# Patient Record
Sex: MALE | Race: WHITE | NOT HISPANIC OR LATINO
[De-identification: names, ages, dates, MRNs, and addresses within clinical notes are randomized per-mention and may not be internally consistent; named-entity substitution may affect disease eponyms.]

---

## 2024-11-06 PROBLEM — Z00.00 ENCOUNTER FOR PREVENTIVE HEALTH EXAMINATION: Status: ACTIVE | Noted: 2024-11-06

## 2024-11-07 DIAGNOSIS — I38 ENDOCARDITIS, VALVE UNSPECIFIED: ICD-10-CM

## 2024-11-14 ENCOUNTER — NON-APPOINTMENT (OUTPATIENT)
Age: 69
End: 2024-11-14

## 2024-12-02 ENCOUNTER — OUTPATIENT (OUTPATIENT)
Dept: OUTPATIENT SERVICES | Facility: HOSPITAL | Age: 69
LOS: 1 days | End: 2024-12-02
Payer: MEDICARE

## 2024-12-02 ENCOUNTER — RESULT REVIEW (OUTPATIENT)
Age: 69
End: 2024-12-02

## 2024-12-02 ENCOUNTER — NON-APPOINTMENT (OUTPATIENT)
Age: 69
End: 2024-12-02

## 2024-12-02 ENCOUNTER — APPOINTMENT (OUTPATIENT)
Dept: CARDIOTHORACIC SURGERY | Facility: CLINIC | Age: 69
End: 2024-12-02
Payer: MEDICARE

## 2024-12-02 ENCOUNTER — APPOINTMENT (OUTPATIENT)
Dept: CT IMAGING | Facility: HOSPITAL | Age: 69
End: 2024-12-02
Payer: MEDICARE

## 2024-12-02 ENCOUNTER — APPOINTMENT (OUTPATIENT)
Dept: ULTRASOUND IMAGING | Facility: HOSPITAL | Age: 69
End: 2024-12-02

## 2024-12-02 VITALS
HEART RATE: 70 BPM | DIASTOLIC BLOOD PRESSURE: 55 MMHG | HEIGHT: 70 IN | OXYGEN SATURATION: 93 % | BODY MASS INDEX: 44.67 KG/M2 | SYSTOLIC BLOOD PRESSURE: 105 MMHG | TEMPERATURE: 97.3 F | WEIGHT: 312 LBS

## 2024-12-02 DIAGNOSIS — Z87.39 PERSONAL HISTORY OF OTHER DISEASES OF THE MUSCULOSKELETAL SYSTEM AND CONNECTIVE TISSUE: ICD-10-CM

## 2024-12-02 DIAGNOSIS — Z86.79 PERSONAL HISTORY OF OTHER DISEASES OF THE CIRCULATORY SYSTEM: ICD-10-CM

## 2024-12-02 DIAGNOSIS — Z86.69 PERSONAL HISTORY OF OTHER DISEASES OF THE NERVOUS SYSTEM AND SENSE ORGANS: ICD-10-CM

## 2024-12-02 DIAGNOSIS — I51.3 INTRACARDIAC THROMBOSIS, NOT ELSEWHERE CLASSIFIED: ICD-10-CM

## 2024-12-02 DIAGNOSIS — Z86.39 PERSONAL HISTORY OF OTHER ENDOCRINE, NUTRITIONAL AND METABOLIC DISEASE: ICD-10-CM

## 2024-12-02 DIAGNOSIS — I25.118 ATHEROSCLEROTIC HEART DISEASE OF NATIVE CORONARY ARTERY WITH OTHER FORMS OF ANGINA PECTORIS: ICD-10-CM

## 2024-12-02 DIAGNOSIS — Z87.891 PERSONAL HISTORY OF NICOTINE DEPENDENCE: ICD-10-CM

## 2024-12-02 DIAGNOSIS — I38 ENDOCARDITIS, VALVE UNSPECIFIED: ICD-10-CM

## 2024-12-02 DIAGNOSIS — Z87.19 PERSONAL HISTORY OF OTHER DISEASES OF THE DIGESTIVE SYSTEM: ICD-10-CM

## 2024-12-02 DIAGNOSIS — Z87.448 PERSONAL HISTORY OF OTHER DISEASES OF URINARY SYSTEM: ICD-10-CM

## 2024-12-02 DIAGNOSIS — I35.0 NONRHEUMATIC AORTIC (VALVE) STENOSIS: ICD-10-CM

## 2024-12-02 PROCEDURE — 93880 EXTRACRANIAL BILAT STUDY: CPT

## 2024-12-02 PROCEDURE — 82565 ASSAY OF CREATININE: CPT

## 2024-12-02 PROCEDURE — 93321 DOPPLER ECHO F-UP/LMTD STD: CPT

## 2024-12-02 PROCEDURE — 71275 CT ANGIOGRAPHY CHEST: CPT | Mod: 26

## 2024-12-02 PROCEDURE — 83880 ASSAY OF NATRIURETIC PEPTIDE: CPT

## 2024-12-02 PROCEDURE — 75573 CT HRT C+ STRUX CGEN HRT DS: CPT | Mod: 26

## 2024-12-02 PROCEDURE — 93321 DOPPLER ECHO F-UP/LMTD STD: CPT | Mod: 26

## 2024-12-02 PROCEDURE — 80053 COMPREHEN METABOLIC PANEL: CPT

## 2024-12-02 PROCEDURE — 93880 EXTRACRANIAL BILAT STUDY: CPT | Mod: 26

## 2024-12-02 PROCEDURE — 74174 CTA ABD&PLVS W/CONTRAST: CPT

## 2024-12-02 PROCEDURE — 36415 COLL VENOUS BLD VENIPUNCTURE: CPT

## 2024-12-02 PROCEDURE — 86900 BLOOD TYPING SEROLOGIC ABO: CPT

## 2024-12-02 PROCEDURE — 74174 CTA ABD&PLVS W/CONTRAST: CPT | Mod: 26

## 2024-12-02 PROCEDURE — 93308 TTE F-UP OR LMTD: CPT | Mod: 26

## 2024-12-02 PROCEDURE — 71275 CT ANGIOGRAPHY CHEST: CPT

## 2024-12-02 PROCEDURE — 86850 RBC ANTIBODY SCREEN: CPT

## 2024-12-02 PROCEDURE — 86901 BLOOD TYPING SEROLOGIC RH(D): CPT

## 2024-12-02 PROCEDURE — 75573 CT HRT C+ STRUX CGEN HRT DS: CPT

## 2024-12-02 PROCEDURE — 85730 THROMBOPLASTIN TIME PARTIAL: CPT

## 2024-12-02 PROCEDURE — 85025 COMPLETE CBC W/AUTO DIFF WBC: CPT

## 2024-12-02 PROCEDURE — 85610 PROTHROMBIN TIME: CPT

## 2024-12-02 PROCEDURE — 99204 OFFICE O/P NEW MOD 45 MIN: CPT

## 2024-12-04 PROBLEM — Z86.79 HISTORY OF HYPERTENSION: Status: RESOLVED | Noted: 2024-12-04 | Resolved: 2024-12-04

## 2024-12-04 PROBLEM — Z87.891 FORMER SMOKER: Status: ACTIVE | Noted: 2024-12-04

## 2024-12-04 PROBLEM — Z87.39 HISTORY OF GOUT: Status: RESOLVED | Noted: 2024-12-04 | Resolved: 2024-12-04

## 2024-12-04 PROBLEM — Z86.39 HISTORY OF DIABETES MELLITUS: Status: RESOLVED | Noted: 2024-12-04 | Resolved: 2024-12-04

## 2024-12-04 PROBLEM — I51.3 LV (LEFT VENTRICULAR) MURAL THROMBUS: Status: RESOLVED | Noted: 2024-12-04 | Resolved: 2024-12-04

## 2024-12-04 PROBLEM — Z87.448 HISTORY OF CHRONIC KIDNEY DISEASE: Status: RESOLVED | Noted: 2024-12-04 | Resolved: 2024-12-04

## 2024-12-04 PROBLEM — Z86.39 HISTORY OF HYPERLIPIDEMIA: Status: RESOLVED | Noted: 2024-12-04 | Resolved: 2024-12-04

## 2024-12-04 PROBLEM — Z86.69 HISTORY OF SLEEP APNEA: Status: RESOLVED | Noted: 2024-12-04 | Resolved: 2024-12-04

## 2024-12-04 PROBLEM — I25.118 CORONARY ARTERY DISEASE WITH OTHER FORM OF ANGINA PECTORIS: Status: RESOLVED | Noted: 2024-12-04 | Resolved: 2024-12-04

## 2024-12-04 PROBLEM — Z87.19 HISTORY OF FATTY INFILTRATION OF LIVER: Status: RESOLVED | Noted: 2024-12-04 | Resolved: 2024-12-04

## 2024-12-04 RX ORDER — RIVAROXABAN 20 MG/1
20 TABLET, FILM COATED ORAL
Qty: 30 | Refills: 0 | Status: ACTIVE | COMMUNITY
Start: 2024-10-06

## 2024-12-05 RX ORDER — TIRZEPATIDE 2.5 MG/.5ML
2.5 INJECTION, SOLUTION SUBCUTANEOUS
Qty: 2 | Refills: 0 | Status: ACTIVE | COMMUNITY
Start: 2024-06-11

## 2024-12-05 RX ORDER — LISINOPRIL 2.5 MG/1
2.5 TABLET ORAL
Qty: 90 | Refills: 0 | Status: ACTIVE | COMMUNITY
Start: 2024-06-26

## 2024-12-05 RX ORDER — TICAGRELOR 90 MG/1
90 TABLET ORAL
Qty: 180 | Refills: 0 | Status: ACTIVE | COMMUNITY
Start: 2024-10-06

## 2024-12-05 RX ORDER — BUDESONIDE, GLYCOPYRROLATE, AND FORMOTEROL FUMARATE 160; 9; 4.8 UG/1; UG/1; UG/1
160-9-4.8 AEROSOL, METERED RESPIRATORY (INHALATION)
Qty: 11 | Refills: 0 | Status: ACTIVE | COMMUNITY
Start: 2024-09-09

## 2024-12-06 DIAGNOSIS — I70.0 ATHEROSCLEROSIS OF AORTA: ICD-10-CM

## 2024-12-06 DIAGNOSIS — I65.23 OCCLUSION AND STENOSIS OF BILATERAL CAROTID ARTERIES: ICD-10-CM

## 2024-12-06 DIAGNOSIS — N40.0 BENIGN PROSTATIC HYPERPLASIA WITHOUT LOWER URINARY TRACT SYMPTOMS: ICD-10-CM

## 2024-12-06 DIAGNOSIS — K40.90 UNILATERAL INGUINAL HERNIA, WITHOUT OBSTRUCTION OR GANGRENE, NOT SPECIFIED AS RECURRENT: ICD-10-CM

## 2024-12-06 DIAGNOSIS — R59.0 LOCALIZED ENLARGED LYMPH NODES: ICD-10-CM

## 2024-12-06 DIAGNOSIS — I25.10 ATHEROSCLEROTIC HEART DISEASE OF NATIVE CORONARY ARTERY WITHOUT ANGINA PECTORIS: ICD-10-CM

## 2024-12-06 DIAGNOSIS — I35.0 NONRHEUMATIC AORTIC (VALVE) STENOSIS: ICD-10-CM

## 2024-12-06 DIAGNOSIS — K80.20 CALCULUS OF GALLBLADDER WITHOUT CHOLECYSTITIS WITHOUT OBSTRUCTION: ICD-10-CM

## 2024-12-06 DIAGNOSIS — Z01.818 ENCOUNTER FOR OTHER PREPROCEDURAL EXAMINATION: ICD-10-CM

## 2024-12-06 DIAGNOSIS — J98.11 ATELECTASIS: ICD-10-CM

## 2024-12-10 ENCOUNTER — APPOINTMENT (OUTPATIENT)
Dept: CARDIOTHORACIC SURGERY | Facility: CLINIC | Age: 69
End: 2024-12-10
Payer: MEDICARE

## 2024-12-10 DIAGNOSIS — I27.20 PULMONARY HYPERTENSION, UNSPECIFIED: ICD-10-CM

## 2024-12-10 DIAGNOSIS — I48.91 UNSPECIFIED ATRIAL FIBRILLATION: ICD-10-CM

## 2024-12-10 DIAGNOSIS — Z86.718 PERSONAL HISTORY OF OTHER VENOUS THROMBOSIS AND EMBOLISM: ICD-10-CM

## 2024-12-10 PROCEDURE — 99204 OFFICE O/P NEW MOD 45 MIN: CPT

## 2024-12-10 PROCEDURE — 99214 OFFICE O/P EST MOD 30 MIN: CPT

## 2024-12-10 RX ORDER — MONTELUKAST 10 MG/1
10 TABLET, FILM COATED ORAL
Refills: 0 | Status: ACTIVE | COMMUNITY

## 2024-12-10 RX ORDER — FENOFIBRATE 145 MG/1
145 TABLET ORAL DAILY
Refills: 0 | Status: ACTIVE | COMMUNITY

## 2024-12-10 RX ORDER — ICOSAPENT ETHYL 1000 MG/1
1 CAPSULE ORAL
Refills: 0 | Status: ACTIVE | COMMUNITY

## 2024-12-10 RX ORDER — METOPROLOL SUCCINATE 25 MG/1
25 TABLET, EXTENDED RELEASE ORAL
Qty: 90 | Refills: 3 | Status: ACTIVE | COMMUNITY
Start: 2024-10-06

## 2024-12-10 RX ORDER — TAMSULOSIN HYDROCHLORIDE 0.4 MG/1
0.4 CAPSULE ORAL
Qty: 90 | Refills: 3 | Status: ACTIVE | COMMUNITY

## 2024-12-10 RX ORDER — ATORVASTATIN CALCIUM 10 MG/1
10 TABLET, FILM COATED ORAL
Refills: 0 | Status: ACTIVE | COMMUNITY

## 2024-12-10 RX ORDER — MACITENTAN 10 MG/1
10 TABLET, FILM COATED ORAL
Refills: 0 | Status: ACTIVE | COMMUNITY

## 2024-12-11 ENCOUNTER — NON-APPOINTMENT (OUTPATIENT)
Age: 69
End: 2024-12-11

## 2024-12-11 PROBLEM — I48.91 ATRIAL FIBRILLATION, UNSPECIFIED TYPE: Status: ACTIVE | Noted: 2024-12-04

## 2024-12-11 PROBLEM — Z86.718 HISTORY OF DVT (DEEP VEIN THROMBOSIS): Status: RESOLVED | Noted: 2024-12-11 | Resolved: 2024-12-11

## 2024-12-20 ENCOUNTER — NON-APPOINTMENT (OUTPATIENT)
Age: 69
End: 2024-12-20

## 2024-12-23 ENCOUNTER — APPOINTMENT (OUTPATIENT)
Dept: CARDIOTHORACIC SURGERY | Facility: HOSPITAL | Age: 69
End: 2024-12-23

## 2025-01-04 ENCOUNTER — INPATIENT (INPATIENT)
Facility: HOSPITAL | Age: 70
LOS: 8 days | Discharge: HOME | End: 2025-01-13
Attending: INTERNAL MEDICINE | Admitting: INTERNAL MEDICINE
Payer: MEDICARE

## 2025-01-04 VITALS
OXYGEN SATURATION: 97 % | HEART RATE: 93 BPM | WEIGHT: 309.09 LBS | RESPIRATION RATE: 14 BRPM | SYSTOLIC BLOOD PRESSURE: 98 MMHG | DIASTOLIC BLOOD PRESSURE: 60 MMHG

## 2025-01-04 DIAGNOSIS — I24.0 ACUTE CORONARY THROMBOSIS NOT RESULTING IN MYOCARDIAL INFARCTION: Chronic | ICD-10-CM

## 2025-01-04 DIAGNOSIS — Z98.61 CORONARY ANGIOPLASTY STATUS: Chronic | ICD-10-CM

## 2025-01-04 DIAGNOSIS — Z98.890 OTHER SPECIFIED POSTPROCEDURAL STATES: Chronic | ICD-10-CM

## 2025-01-04 LAB
A1C WITH ESTIMATED AVERAGE GLUCOSE RESULT: 5.3 % — SIGNIFICANT CHANGE UP (ref 4–5.6)
ALBUMIN SERPL ELPH-MCNC: 4.6 G/DL — SIGNIFICANT CHANGE UP (ref 3.3–5)
ALP SERPL-CCNC: 49 U/L — SIGNIFICANT CHANGE UP (ref 40–120)
ALT FLD-CCNC: 14 U/L — SIGNIFICANT CHANGE UP (ref 10–45)
ANION GAP SERPL CALC-SCNC: 12 MMOL/L — SIGNIFICANT CHANGE UP (ref 5–17)
APTT BLD: 36.1 SEC — HIGH (ref 24.5–35.6)
APTT BLD: 40.6 SEC — HIGH (ref 24.5–35.6)
AST SERPL-CCNC: 24 U/L — SIGNIFICANT CHANGE UP (ref 10–40)
BASOPHILS # BLD AUTO: 0.06 K/UL — SIGNIFICANT CHANGE UP (ref 0–0.2)
BASOPHILS NFR BLD AUTO: 0.9 % — SIGNIFICANT CHANGE UP (ref 0–2)
BILIRUB SERPL-MCNC: 0.5 MG/DL — SIGNIFICANT CHANGE UP (ref 0.2–1.2)
BLD GP AB SCN SERPL QL: NEGATIVE — SIGNIFICANT CHANGE UP
BUN SERPL-MCNC: 23 MG/DL — SIGNIFICANT CHANGE UP (ref 7–23)
CALCIUM SERPL-MCNC: 9.8 MG/DL — SIGNIFICANT CHANGE UP (ref 8.4–10.5)
CHLORIDE SERPL-SCNC: 102 MMOL/L — SIGNIFICANT CHANGE UP (ref 96–108)
CO2 SERPL-SCNC: 24 MMOL/L — SIGNIFICANT CHANGE UP (ref 22–31)
CREAT SERPL-MCNC: 1.45 MG/DL — HIGH (ref 0.5–1.3)
EGFR: 52 ML/MIN/1.73M2 — LOW
EOSINOPHIL # BLD AUTO: 0.11 K/UL — SIGNIFICANT CHANGE UP (ref 0–0.5)
EOSINOPHIL NFR BLD AUTO: 1.6 % — SIGNIFICANT CHANGE UP (ref 0–6)
ESTIMATED AVERAGE GLUCOSE: 105 MG/DL — SIGNIFICANT CHANGE UP (ref 68–114)
GLUCOSE SERPL-MCNC: 84 MG/DL — SIGNIFICANT CHANGE UP (ref 70–99)
HCT VFR BLD CALC: 40.8 % — SIGNIFICANT CHANGE UP (ref 39–50)
HGB BLD-MCNC: 13.2 G/DL — SIGNIFICANT CHANGE UP (ref 13–17)
IMM GRANULOCYTES NFR BLD AUTO: 0.3 % — SIGNIFICANT CHANGE UP (ref 0–0.9)
INR BLD: 1.23 — HIGH (ref 0.85–1.16)
INR BLD: 1.26 — HIGH (ref 0.85–1.16)
LYMPHOCYTES # BLD AUTO: 0.95 K/UL — LOW (ref 1–3.3)
LYMPHOCYTES # BLD AUTO: 14.1 % — SIGNIFICANT CHANGE UP (ref 13–44)
MCHC RBC-ENTMCNC: 29.3 PG — SIGNIFICANT CHANGE UP (ref 27–34)
MCHC RBC-ENTMCNC: 32.4 G/DL — SIGNIFICANT CHANGE UP (ref 32–36)
MCV RBC AUTO: 90.7 FL — SIGNIFICANT CHANGE UP (ref 80–100)
MONOCYTES # BLD AUTO: 0.5 K/UL — SIGNIFICANT CHANGE UP (ref 0–0.9)
MONOCYTES NFR BLD AUTO: 7.4 % — SIGNIFICANT CHANGE UP (ref 2–14)
NEUTROPHILS # BLD AUTO: 5.12 K/UL — SIGNIFICANT CHANGE UP (ref 1.8–7.4)
NEUTROPHILS NFR BLD AUTO: 75.7 % — SIGNIFICANT CHANGE UP (ref 43–77)
NRBC # BLD: 0 /100 WBCS — SIGNIFICANT CHANGE UP (ref 0–0)
NT-PROBNP SERPL-SCNC: 994 PG/ML — HIGH (ref 0–300)
PLATELET # BLD AUTO: 192 K/UL — SIGNIFICANT CHANGE UP (ref 150–400)
POTASSIUM SERPL-MCNC: 4.3 MMOL/L — SIGNIFICANT CHANGE UP (ref 3.5–5.3)
POTASSIUM SERPL-SCNC: 4.3 MMOL/L — SIGNIFICANT CHANGE UP (ref 3.5–5.3)
PROT SERPL-MCNC: 8.6 G/DL — HIGH (ref 6–8.3)
PROTHROM AB SERPL-ACNC: 14.1 SEC — HIGH (ref 9.9–13.4)
PROTHROM AB SERPL-ACNC: 14.5 SEC — HIGH (ref 9.9–13.4)
RBC # BLD: 4.5 M/UL — SIGNIFICANT CHANGE UP (ref 4.2–5.8)
RBC # FLD: 13.9 % — SIGNIFICANT CHANGE UP (ref 10.3–14.5)
RH IG SCN BLD-IMP: POSITIVE — SIGNIFICANT CHANGE UP
SODIUM SERPL-SCNC: 138 MMOL/L — SIGNIFICANT CHANGE UP (ref 135–145)
TSH SERPL-MCNC: 3.32 UIU/ML — SIGNIFICANT CHANGE UP (ref 0.27–4.2)
WBC # BLD: 6.76 K/UL — SIGNIFICANT CHANGE UP (ref 3.8–10.5)
WBC # FLD AUTO: 6.76 K/UL — SIGNIFICANT CHANGE UP (ref 3.8–10.5)

## 2025-01-04 PROCEDURE — 71045 X-RAY EXAM CHEST 1 VIEW: CPT | Mod: 26

## 2025-01-04 PROCEDURE — 93010 ELECTROCARDIOGRAM REPORT: CPT

## 2025-01-04 RX ORDER — TAMSULOSIN HYDROCHLORIDE 0.4 MG/1
0.4 CAPSULE ORAL AT BEDTIME
Refills: 0 | Status: DISCONTINUED | OUTPATIENT
Start: 2025-01-04 | End: 2025-01-06

## 2025-01-04 RX ORDER — MACITENTAN 10 MG/1
1 TABLET, FILM COATED ORAL
Refills: 0 | DISCHARGE

## 2025-01-04 RX ORDER — DEXTROSE MONOHYDRATE 25 G/50ML
25 INJECTION, SOLUTION INTRAVENOUS ONCE
Refills: 0 | Status: DISCONTINUED | OUTPATIENT
Start: 2025-01-04 | End: 2025-01-04

## 2025-01-04 RX ORDER — SODIUM CHLORIDE 9 MG/ML
1000 INJECTION, SOLUTION INTRAVENOUS
Refills: 0 | Status: DISCONTINUED | OUTPATIENT
Start: 2025-01-04 | End: 2025-01-04

## 2025-01-04 RX ORDER — INSULIN LISPRO 100/ML
VIAL (ML) SUBCUTANEOUS
Refills: 0 | Status: DISCONTINUED | OUTPATIENT
Start: 2025-01-04 | End: 2025-01-04

## 2025-01-04 RX ORDER — TAMSULOSIN HYDROCHLORIDE 0.4 MG/1
1 CAPSULE ORAL
Refills: 0 | DISCHARGE

## 2025-01-04 RX ORDER — TIRZEPATIDE 7.5 MG/.5ML
5 INJECTION, SOLUTION SUBCUTANEOUS
Refills: 0 | DISCHARGE

## 2025-01-04 RX ORDER — ASPIRIN 81 MG
81 TABLET, DELAYED RELEASE (ENTERIC COATED) ORAL DAILY
Refills: 0 | Status: DISCONTINUED | OUTPATIENT
Start: 2025-01-04 | End: 2025-01-06

## 2025-01-04 RX ORDER — GLYCOPYRROLATE AND FORMOTEROL FUMARATE 9; 4.8 UG/1; UG/1
2 AEROSOL, METERED RESPIRATORY (INHALATION)
Refills: 0 | Status: DISCONTINUED | OUTPATIENT
Start: 2025-01-04 | End: 2025-01-05

## 2025-01-04 RX ORDER — MACITENTAN 10 MG/1
10 TABLET, FILM COATED ORAL DAILY
Refills: 0 | Status: DISCONTINUED | OUTPATIENT
Start: 2025-01-04 | End: 2025-01-06

## 2025-01-04 RX ORDER — MOMETASONE FUROATE 220 UG/1
2 INHALANT RESPIRATORY (INHALATION) DAILY
Refills: 0 | Status: DISCONTINUED | OUTPATIENT
Start: 2025-01-04 | End: 2025-01-04

## 2025-01-04 RX ORDER — FENOFIBRATE 48 MG/1
1 TABLET ORAL
Refills: 0 | DISCHARGE

## 2025-01-04 RX ORDER — GLUCAGON INJECTION, SOLUTION 0.5 MG/.1ML
1 INJECTION, SOLUTION SUBCUTANEOUS ONCE
Refills: 0 | Status: DISCONTINUED | OUTPATIENT
Start: 2025-01-04 | End: 2025-01-04

## 2025-01-04 RX ORDER — HEPARIN SODIUM 1000 [USP'U]/ML
1600 INJECTION, SOLUTION INTRAVENOUS; SUBCUTANEOUS
Qty: 25000 | Refills: 0 | Status: DISCONTINUED | OUTPATIENT
Start: 2025-01-04 | End: 2025-01-06

## 2025-01-04 RX ORDER — ATORVASTATIN CALCIUM 40 MG/1
1 TABLET, FILM COATED ORAL
Refills: 0 | DISCHARGE

## 2025-01-04 RX ORDER — METOPROLOL TARTRATE 50 MG
25 TABLET ORAL EVERY 12 HOURS
Refills: 0 | Status: DISCONTINUED | OUTPATIENT
Start: 2025-01-04 | End: 2025-01-05

## 2025-01-04 RX ORDER — DEXTROSE MONOHYDRATE 25 G/50ML
15 INJECTION, SOLUTION INTRAVENOUS ONCE
Refills: 0 | Status: DISCONTINUED | OUTPATIENT
Start: 2025-01-04 | End: 2025-01-04

## 2025-01-04 RX ORDER — MONTELUKAST SODIUM 10 MG/1
1 TABLET, FILM COATED ORAL
Refills: 0 | DISCHARGE

## 2025-01-04 RX ORDER — SODIUM CHLORIDE 9 MG/ML
3 INJECTION, SOLUTION INTRAMUSCULAR; INTRAVENOUS; SUBCUTANEOUS EVERY 8 HOURS
Refills: 0 | Status: DISCONTINUED | OUTPATIENT
Start: 2025-01-04 | End: 2025-01-06

## 2025-01-04 RX ORDER — ICOSAPENT ETHYL 1 G/1
2 CAPSULE ORAL
Refills: 0 | DISCHARGE

## 2025-01-04 RX ORDER — FENOFIBRATE 48 MG/1
145 TABLET ORAL DAILY
Refills: 0 | Status: DISCONTINUED | OUTPATIENT
Start: 2025-01-04 | End: 2025-01-06

## 2025-01-04 RX ORDER — DEXTROSE MONOHYDRATE 25 G/50ML
12.5 INJECTION, SOLUTION INTRAVENOUS ONCE
Refills: 0 | Status: DISCONTINUED | OUTPATIENT
Start: 2025-01-04 | End: 2025-01-04

## 2025-01-04 RX ORDER — MONTELUKAST SODIUM 10 MG/1
10 TABLET, FILM COATED ORAL DAILY
Refills: 0 | Status: DISCONTINUED | OUTPATIENT
Start: 2025-01-04 | End: 2025-01-06

## 2025-01-04 RX ORDER — ATORVASTATIN CALCIUM 40 MG/1
10 TABLET, FILM COATED ORAL AT BEDTIME
Refills: 0 | Status: DISCONTINUED | OUTPATIENT
Start: 2025-01-04 | End: 2025-01-06

## 2025-01-04 RX ADMIN — Medication 25 MILLIGRAM(S): at 15:21

## 2025-01-04 RX ADMIN — SODIUM CHLORIDE 3 MILLILITER(S): 9 INJECTION, SOLUTION INTRAMUSCULAR; INTRAVENOUS; SUBCUTANEOUS at 22:06

## 2025-01-04 RX ADMIN — HEPARIN SODIUM 16 UNIT(S)/HR: 1000 INJECTION, SOLUTION INTRAVENOUS; SUBCUTANEOUS at 18:56

## 2025-01-04 RX ADMIN — SODIUM CHLORIDE 3 MILLILITER(S): 9 INJECTION, SOLUTION INTRAMUSCULAR; INTRAVENOUS; SUBCUTANEOUS at 16:11

## 2025-01-04 RX ADMIN — MOMETASONE FUROATE 2 PUFF(S): 220 INHALANT RESPIRATORY (INHALATION) at 15:21

## 2025-01-04 RX ADMIN — TAMSULOSIN HYDROCHLORIDE 0.4 MILLIGRAM(S): 0.4 CAPSULE ORAL at 22:06

## 2025-01-04 RX ADMIN — ATORVASTATIN CALCIUM 10 MILLIGRAM(S): 40 TABLET, FILM COATED ORAL at 22:06

## 2025-01-04 RX ADMIN — Medication 81 MILLIGRAM(S): at 18:37

## 2025-01-04 RX ADMIN — MONTELUKAST SODIUM 10 MILLIGRAM(S): 10 TABLET, FILM COATED ORAL at 15:21

## 2025-01-04 NOTE — H&P ADULT - HISTORY OF PRESENT ILLNESS
69M, former smoker (Quit 1997) with PMH of BPH, PE (2013), DVT (in 2024), STEPHEN, fatty liver, gout, OA, HTN, HLD, T2DM (on Mounjaro), Stage 3 CKD (baseline Cr 1.5), CAD/NSTEMI s/p stent to RCA (10/4/24 at [facility]), Afib s/p ablation (on Xarelto), LV thrombus s/p open heart thrombus extraction at [facility] (2020), pulmonary hypertension (on Opsumit), HFpEF and symptomatic severe aortic stenosis, deemed a good candidate for TAVR pending dental clearance. Arrived today 1/4/25 as planned admission for medical optimization (including tooth extraction on Monday 1/6/25) prior to TAVR this admission.

## 2025-01-04 NOTE — PATIENT PROFILE ADULT - FALL HARM RISK - TYPE OF ASSESSMENT
1. Hearing conservation strongly recommended.  2. Trial of amplification bilaterally also recommended.  3. Re-check of hearing in 18-24 months or sooner if subjective change noted.  4. F/U with PCP as per schedule.  
Admission

## 2025-01-04 NOTE — PATIENT PROFILE ADULT - NSPROGENOTHERPROVIDER_GEN_A_NUR
Dear Monisha Falcon MD    I saw Donald Mayne on 1/6/2023 in my clinic for the first time at your request.  The patient is accompanied by his wife    CHIEF COMPLAINT:    Chief Complaint   Patient presents with   • Establish Care     Discuss hx stroke and medication       HISTORY OF PRESENT ILLNESS:    Donald Mayne is a pleasant 74 year old right-handed  presented to the clinic for possibly discontinue his anti seizure medication, levetiracetam.  In August 2014, he had transient left-sided weakness, when he was evaluated [facility].  His MRI of the brain was normal.  He was evaluated by Dr. Dos Santos and diagnosed with TIA.  In June 2016, he developed significant speech impairment, confusion and right visual field defects.  He was admitted to [facility] and was diagnosed with large left temporal and parietal occipital hemorrhage.  CT angio head and neck did not show any significant abnormalities.  He was treated conservatively.  During this hospitalization, he was started on levetiracetam 500 mg twice a day.  The patient reported that he never had a seizure.  Subsequently he followed up with neurosurgeon, Dr. Paulino for few years.  In December 2018, he had MRI of the brain obtained by geriatrics, which showed large area of encephalomalacia in the left temporal and occipital regions.  Additionally he was noted to have small vessel ischemic disease.  No significant findings reported suggestive of amyloid angiopathy.  I did not have access to films of previous MRIs of the brain as these were performed in [facility] system.  Couple of months after the intracerebral hemorrhage, he had driving evaluation, which he passed.  He denies any symptoms concerning for seizures since intracranial hemorrhage in June 2016.  He denies being depressed.  He reported his memory being good.  His wife take care of bills.  He is independent of activities daily living.  He denies any issues with driving.  He is retired.  He  denies visual hallucinations.  He reported occasional exertional chest pain.  He denies shortness of breath, difficulty swallowing, significant neck or low back pain, GI symptoms, double vision or vertigo.  In the past, he suffered with lumbar spondylosis and lumbosacral radiculopathy.  He occasionally uses alcohol.  He does not smoke cigarettes.  He does not use assistive devices for assistance of ambulation.  He denies bladder dysfunction.    He is currently taking Keppra 5 mg twice a day she also takes Crestor.  He does not take antiplatelets.  His comprehensive metabolic profile from July 2022 was essentially normal.  Hemoglobin A1 c and vitamin-D levels were normal.  His lipid panel was also normal.  I could not find previous Keppra levels in the chart    PAST MEDICAL HISTORY:      Sleep apnea                                     2008          Personal history of colonic polyps                            GERD (gastroesophageal reflux disease)                        Hyperlipidemia                                                Seasonal allergies                                            Chronic back pain                                             TIA (transient ischemic attack)                 08/2014         Comment: L sided weakness    Essential tremor                                2015          COVID-19                                        06/2022       Knee pain, chronic                                              Comment: s/p injections    Intracranial bleed (CMS/[facility])                    06/2016         Comment: L parietal    CVA (cerebral vascular accident) (CMS/[facility])      06/2016         Comment: Hemorrhagic    Hip pain                                        2019          Aortic stenosis                                 2021          Chicken pox                                                   Past Surgical History:   Procedure Laterality Date   • Colonoscopy  05/30/2019    polyps  ? recheck in 5 yrs    • Colonoscopy diagnostic  10/02/2012    Dr. Lerner/6mm polyp @ 20cm-Hyperplastic, Diffuse Diverticulosis, most severe in [place]   • Colonoscopy w biopsy  02/03/2006    Fam Hx Colon polyps, hyperplastic polyp   • Colonoscopy w biopsy  01/31/2013    Dr. Stockton/Fam hx polyps, 4 tubular adenomas, 5yr recall    • Extracapsular cataract removal w insert io lens prosth wo ecp Bilateral 2019   • Stress test  10/2022    neg   • Total knee replacement Right 04/30/2018   • Vasectomy         Social History     Socioeconomic History   • Marital status: /Civil Union     Spouse name: Not on file   • Number of children: Not on file   • Years of education: Not on file   • Highest education level: Not on file   Occupational History   • Not on file   Tobacco Use   • Smoking status: Never   • Smokeless tobacco: Never   Substance and Sexual Activity   • Alcohol use: No     Comment: rarely beer/wine special occasions   • Drug use: No   • Sexual activity: Not on file   Other Topics Concern   • Not on file   Social History Narrative   • Not on file     Social Determinants of Health     Financial Resource Strain: Not on file   Food Insecurity: Not on file   Transportation Needs: Not on file   Physical Activity: Not on file   Stress: Not on file   Social Connections: Not on file   Intimate Partner Violence: Not on file       Family History   Adopted: Yes   Problem Relation Age of Onset   • Cancer Father    • Alcohol Abuse Father    • Multiple Sclerosis Daughter    • Cancer Paternal Uncle    • Cancer Half-Sister         brain ca       ALLERGIES:   Allergen Reactions   • Food (Food Or Med) Other (See Comments)     SEAFOOD   • Indomethacin Other (See Comments)     drowsiness  drowsiness     • Ondansetron RASH   • Unknown Other (See Comments)     \"some kind of a steroid that puts me to sleep\"       Current Outpatient Medications   Medication Sig Dispense Refill   • amLODIPine (NORVASC) 10 MG tablet Take 1 tablet by mouth daily.      • isosorbide mononitrate (IMDUR) 30 MG 24 hr tablet      • levETIRAcetam (KepPRA) 500 MG tablet 2 times daily.     • lisinopril (ZESTRIL) 5 MG tablet      • metoPROLOL succinate (TOPROL-XL) 25 MG 24 hr tablet      • nitroGLYCERIN (NITROSTAT) 0.4 MG sublingual tablet DISSOLVE 1 TABLET UNDER THE TONGUE EVERY 5 MINUTES AS NEEDED FOR CHEST PAIN     • rosuvastatin (CRESTOR) 5 MG tablet      • Saline Spray 0.65 % Solution 1 spray.       No current facility-administered medications for this visit.       GENERAL REVIEW OF SYSTEM:    Constitutional:  No report of fever or chills   Eyes:  No report of change in visual acuity or blurred vision   HENT:  No report of dysphagia  Respiratory:  No report of shortness of breath   Cardiovascular:  No report of chest pain or pedal edema   Gastrointestinal:  No report of  abdominal pain, nausea, vomiting, constipation or diarrhea   Genitourinary:  No report of bladder dysfunction   Musculoskeletal:  No report of  neck or low back pain   Integument:  No report of  rash   Neurologic:  As described above  Endocrine:  No report of  polyuria or polydipsia   Lymphatic:  No report of swollen glands   Psychiatric:  No report of depression, anxiety or insomnia    PHYSICAL EXAM:    The patient is well-nourished and well developed, in no acute distress.  Blood pressure 110/60, pulse 80, weight (!) 139.4 kg (307 lb 6.4 oz)..   Heart:  Regular S1 and S2, systolic murmur present; Lungs:  Clear; Carotids:  No bruits.  There is no kyphosis, scoliosis or abnormal pigmentation of skin.    NEUROLOGICAL EXAMINATION:    The patient is awake, alert, and oriented x3.  Is able to recall 3 words immediately and in 3 minutes.  He is able to spell the word watch forward and backwards but he is able to follow left-to-right commands.  Attention span and concentration are normal.  Recent and remote memory appears to be intact.  The patient's speech is fluent and language is intact.  The patient's fund of  knowledge is normal.  Pupils are 2 mm round, reactive to light.  Funduscopic examination attempted but could not get a good view of fundi.  Visual fields are full to finger confrontation.  I did not notice right visual field defects.  Extraocular movements are normal.  There is no nystagmus.  Nasolabial folds and facial sensation are intact and symmetric.  Tongue and uvula are midline.  Palate moves symmetrically.  High frequency hearing is intact bilaterally.  Shrugging of shoulders are normal.  There is no pronator drift.  Muscle bulk and tone are normal in all extremities.  Strength is normal in all extremities.  Sensory examination to pinprick, light touch and cold temperature are intact and symmetric in all extremities.  Joint position and vibration perception are intact in feet.  Finger-to-nose examination did not reveal any tremors or dysmetria.  Deep tendon reflexes are 1 in upper extremities, absent in knees, 1+ in right ankle and 1 in left ankle.  Plantars are downgoing.  Gait examination is essentially normal.  Romberg is negative.    ASSESSMENT AND PLAN:    Donald Mayne pleasant 74 year old right-handed  man with a history of TIA involving right MCA distribution in August 2014 and large spontaneous intracranial hemorrhage hemorrhage in the left temporal, parietal and occipital region in June 2016.  He does not have any residual deficits from previous strokes.  Since intracranial hemorrhage in 2016, he has been on levetiracetam which he wants to discontinue.  He has not had any seizures.  The findings of follow-up MRI of the brain from December 2018 as described above with encephalomalacia in the left temporal and occipital regions.  His neurological examination is essentially normal.  I had a long discussion with the patient and his wife about the risk of seizures in patients who suffered with strokes and residual encephalomalacia.  I recommended follow-up imaging study of brain, CT/MRI and EEG.   He is willing to undergo EEG but no imaging studies of brain, which is fine.  If EEG shows any significant focal abnormalities, then he will be at high risk to develop seizures in the future and will continue on Keppra 500 mg twice a day.  If EEG does not show any significant focal abnormalities, then Keppra will be gradually weaned off, 250-500 mg for 1 week, 250-250 for 1 week, 250-0 for 1 week and discontinue. I discussed with the patient about the [place] driving Law, which prohibits driving 90 days following a seizure.  I also discussed with the patient that normal EEG does not rule out that patient will lot experience any seizures in the future and if he happens to experience seizures, then we will restart on Keppra.  Recommend maintaining systolic blood pressures less than 140 mmHg.  He is reassured that he is not suffering with dementia, which is his another concern.  Recommend following in the clinic on as needed basis. Thank you for the opportunity to participate in the care of this patient.    Total encounter time 48 minutes     none

## 2025-01-04 NOTE — H&P ADULT - NSHPREVIEWOFSYSTEMS_GEN_ALL_CORE
Review of Systems  CONSTITUTIONAL:  Denies Fevers / chills, sweats, fatigue, weight loss, weight gain                                      NEURO:  Denies changes in sensation, seizures, syncope, confusion                                                                            EYES:  Denies Blurry vision, discharge, pain, loss of vision                                                                                    ENMT:  Denies Difficulty hearing, vertigo, dysphagia, epistaxis, recent dental work                                       CV:  Denies Chest pain, palpitations, BELTRAN, orthopnea                                                                                          RESPIRATORY:  Denies Wheezing, SOB, cough / sputum, hemoptysis                                                                GI:  Denies Nausea, vomiting, diarrhea, constipation, melena, difficulty swallowing                                               : Denies Hematuria, dysuria, urgency, incontinence                                                                                         MUSCULOSKELETAL:  Denies arthritis, joint swelling, muscle weakness                                                             SKIN/BREAST:  Denies rash, itching, hair loss, masses                                                                                            PSYCH:  Denies depression, anxiety, suicidal ideation                                                                                               HEME/LYMPH:  Denies bruises easily, enlarged lymph nodes, tender lymph nodes                                        ENDOCRINE:  Denies cold intolerance, heat intolerance, polydipsia

## 2025-01-04 NOTE — H&P ADULT - ASSESSMENT
69M, former smoker (Quit 1997) with PMH of BPH, PE (2013), DVT (in 2024), STEPHEN, fatty liver, gout, OA, HTN, HLD, T2DM (on Mounjaro), Stage 3 CKD (baseline Cr 1.5), CAD/NSTEMI s/p stent to RCA (10/4/24 at [facility]), Afib s/p ablation (on Xarelto), LV thrombus s/p open heart thrombus extraction at [facility] (2020), pulmonary hypertension (on Opsumit), HFpEF and symptomatic severe aortic stenosis, deemed a good candidate for TAVR pending dental clearance. Arrived today 1/4/25 as planned admission for medical optimization (including tooth extraction on Monday 1/6/25) prior to TAVR this admission.     Plan:    Neurovascular:   -Pain regimen:    -PRN's:    Cardiovascular:   -Hemodynamically stable.   -Monitor: BP, HR, tele    Respiratory:   -Oxygenating well on ___  -Encourage continued use of IS 10x/hr and frequent ambulation  -CXR:    GI:  -GI PPX:  -PO Diet  -Bowel Regimen:     Renal / :  -BUN/Cr  -Monitor I/O's daily     Endocrine:    -A1c:  -TSH:    Hematologic:  -CBC: H/H-    ID:  -Temperature:   -CBC: WBC-    Prophylaxis:  -DVT prophylaxis with ____  -Continue with SCD's b/l while patient is at rest     Disposition:  -Discharge home once patient is medically ready   69M, former smoker (Quit 1997) with PMH of BPH, PE (2013), DVT (in 2024), STEPHEN, fatty liver, gout, OA, HTN, HLD, T2DM (on Mounjaro), Stage 3 CKD (baseline Cr 1.5), CAD/NSTEMI s/p stent to RCA (10/4/24 at [facility]), Afib s/p ablation (on Xarelto), LV thrombus s/p open heart thrombus extraction at [facility] (2020), pulmonary hypertension (on Opsumit), HFpEF and symptomatic severe aortic stenosis, deemed a good candidate for TAVR pending dental clearance. Arrived today 1/4/25 as planned admission for medical optimization (including tooth extraction on Monday 1/6/25) prior to TAVR this admission.     Plan:  Neurovascular:   -stable, no active issues     HEENT:  -Dental Extractions pending for Monday with OMFS    -holding brilinta until post procedure    -low dose ASA today because has recent stent (oct 2024) and can't fully be off antiplatelets     Cardiovascular:   -preop TAVR     -continue with asa (switching from brilinta 2/2 to pending dental extractions)     -continue Met succ 12.5mg Q12 hours  -CAD hx:    -last PCI in October 2024 (exactly 3 months out from stent)    -on brilinta at home, is on hold given pending dental extractions    -replaced brilinta with asa 81mg to remain on low dose antiplatelet going into dental extractions    -discuss with Dr. Tubbs antiplatelet/AC plan post procedure   -Baseline bigkelleyiny came in in this rhythm as direct admission, asymptomatic   -hx of sternotomy LV thrombus extraction 2020   -hx of HFpEF: continue BB   -HLD: statin 10mg daily   -Hemodynamically stable.   -Monitor: BP, HR, tele    Respiratory:   -hx of STEPHEN: will ask pt if uses CPAP at night   -hx of DVT/PE: starting hep gtt     -on xarelto at home, has been held for last 3 days   -pHTN: Macitentan daily (non-formulary, pt med given to pharmacy)  -COPD: Breztri, Montelukast   -Oxygenating well on room air   -Encourage continued use of IS 10x/hr and frequent ambulation  -CXR: no acute pathology     GI:  -elevated triglycerides: fenofibrate   -GI PPX: pantoprazole 40mg daily   -PO Diet: dash/tlc   -Bowel Regimen: senna     Renal / :  -BPH: tamsulosin   -hx of CKD (baseline Cr coming in 1.5)   -BUN/Cr pending today   -Monitor I/O's daily     Endocrine:    -A1c: 5.3   -TSH: 3.3     Hematologic:  -CBC: H/H- 13/40     ID:  -Temperature: afebrile   -CBC: WBC- 6.7     Prophylaxis:  -DVT prophylaxis with hep gtt   -Continue with SCD's b/l while patient is at rest     Disposition:  -dental extractions Monday  -TAVR 1/9/25

## 2025-01-04 NOTE — H&P ADULT - NSICDXPASTSURGICALHX_GEN_ALL_CORE_FT
PAST SURGICAL HISTORY:  H/O prior ablation treatment     History of percutaneous coronary intervention     Left ventricular thrombus without MI

## 2025-01-04 NOTE — H&P ADULT - NSHPPHYSICALEXAM_GEN_ALL_CORE
PHYSICAL EXAM:  General:   Neurological: AOx3. Motor skills grossly intact  Cardiovascular: Normal S1/S2. Regular rate/rhythm. No murmurs  Respiratory: Lungs CTA bilaterally. No wheezing or rales  Gastrointestinal: +BS in all 4 quadrants. Non-distended. Soft. Non-tender  Extremities: Strength 5/5 b/l upper/lower extremities. Sensation grossly intact upper/lower extremities. No edema. No calf tenderness.  Vascular: Radial 2+bilaterally, DP 2+ b/l  Incision Sites: PHYSICAL EXAM:  General: well appearing sitting in chair in NAD   Neurological: AOx3. Motor skills grossly intact  Cardiovascular: Normal S1/S2. Regular rate/rhythm. +murmur   Respiratory: Lungs CTA bilaterally. No wheezing or rales  Gastrointestinal: +BS in all 4 quadrants. Non-distended. Soft. Non-tender  Extremities: No edema. No calf tenderness.  Vascular: Radial 2+bilaterally, DP 2+ b/l  Incision Sites: none

## 2025-01-05 LAB
ANION GAP SERPL CALC-SCNC: 10 MMOL/L — SIGNIFICANT CHANGE UP (ref 5–17)
APTT BLD: 48.4 SEC — HIGH (ref 24.5–35.6)
APTT BLD: 57.3 SEC — HIGH (ref 24.5–35.6)
APTT BLD: 71.2 SEC — HIGH (ref 24.5–35.6)
APTT BLD: 72.3 SEC — HIGH (ref 24.5–35.6)
BUN SERPL-MCNC: 34 MG/DL — HIGH (ref 7–23)
CALCIUM SERPL-MCNC: 9.4 MG/DL — SIGNIFICANT CHANGE UP (ref 8.4–10.5)
CHLORIDE SERPL-SCNC: 103 MMOL/L — SIGNIFICANT CHANGE UP (ref 96–108)
CO2 SERPL-SCNC: 25 MMOL/L — SIGNIFICANT CHANGE UP (ref 22–31)
CREAT SERPL-MCNC: 1.49 MG/DL — HIGH (ref 0.5–1.3)
EGFR: 51 ML/MIN/1.73M2 — LOW
GLUCOSE SERPL-MCNC: 93 MG/DL — SIGNIFICANT CHANGE UP (ref 70–99)
HCT VFR BLD CALC: 35 % — LOW (ref 39–50)
HGB BLD-MCNC: 11.4 G/DL — LOW (ref 13–17)
INR BLD: 1.22 — HIGH (ref 0.85–1.16)
INR BLD: 1.27 — HIGH (ref 0.85–1.16)
MAGNESIUM SERPL-MCNC: 1.9 MG/DL — SIGNIFICANT CHANGE UP (ref 1.6–2.6)
MCHC RBC-ENTMCNC: 29.9 PG — SIGNIFICANT CHANGE UP (ref 27–34)
MCHC RBC-ENTMCNC: 32.6 G/DL — SIGNIFICANT CHANGE UP (ref 32–36)
MCV RBC AUTO: 91.9 FL — SIGNIFICANT CHANGE UP (ref 80–100)
NRBC # BLD: 0 /100 WBCS — SIGNIFICANT CHANGE UP (ref 0–0)
PLATELET # BLD AUTO: 159 K/UL — SIGNIFICANT CHANGE UP (ref 150–400)
POTASSIUM SERPL-MCNC: 4.4 MMOL/L — SIGNIFICANT CHANGE UP (ref 3.5–5.3)
POTASSIUM SERPL-SCNC: 4.4 MMOL/L — SIGNIFICANT CHANGE UP (ref 3.5–5.3)
PROTHROM AB SERPL-ACNC: 14 SEC — HIGH (ref 9.9–13.4)
PROTHROM AB SERPL-ACNC: 14.6 SEC — HIGH (ref 9.9–13.4)
RBC # BLD: 3.81 M/UL — LOW (ref 4.2–5.8)
RBC # FLD: 13.9 % — SIGNIFICANT CHANGE UP (ref 10.3–14.5)
SODIUM SERPL-SCNC: 138 MMOL/L — SIGNIFICANT CHANGE UP (ref 135–145)
WBC # BLD: 6.29 K/UL — SIGNIFICANT CHANGE UP (ref 3.8–10.5)
WBC # FLD AUTO: 6.29 K/UL — SIGNIFICANT CHANGE UP (ref 3.8–10.5)

## 2025-01-05 PROCEDURE — 99232 SBSQ HOSP IP/OBS MODERATE 35: CPT

## 2025-01-05 RX ORDER — POTASSIUM CHLORIDE 600 MG/1
10 TABLET, FILM COATED, EXTENDED RELEASE ORAL DAILY
Refills: 0 | Status: DISCONTINUED | OUTPATIENT
Start: 2025-01-05 | End: 2025-01-06

## 2025-01-05 RX ORDER — PANTOPRAZOLE 40 MG/1
40 TABLET, DELAYED RELEASE ORAL
Refills: 0 | Status: DISCONTINUED | OUTPATIENT
Start: 2025-01-05 | End: 2025-01-06

## 2025-01-05 RX ORDER — SENNOSIDES 8.6 MG/1
2 TABLET, FILM COATED ORAL AT BEDTIME
Refills: 0 | Status: DISCONTINUED | OUTPATIENT
Start: 2025-01-05 | End: 2025-01-06

## 2025-01-05 RX ORDER — METOPROLOL TARTRATE 50 MG
12.5 TABLET ORAL EVERY 12 HOURS
Refills: 0 | Status: DISCONTINUED | OUTPATIENT
Start: 2025-01-05 | End: 2025-01-06

## 2025-01-05 RX ORDER — FUROSEMIDE 20 MG
20 TABLET ORAL DAILY
Refills: 0 | Status: DISCONTINUED | OUTPATIENT
Start: 2025-01-05 | End: 2025-01-06

## 2025-01-05 RX ADMIN — MACITENTAN 10 MILLIGRAM(S): 10 TABLET, FILM COATED ORAL at 11:51

## 2025-01-05 RX ADMIN — ATORVASTATIN CALCIUM 10 MILLIGRAM(S): 40 TABLET, FILM COATED ORAL at 22:07

## 2025-01-05 RX ADMIN — Medication 81 MILLIGRAM(S): at 11:46

## 2025-01-05 RX ADMIN — SODIUM CHLORIDE 3 MILLILITER(S): 9 INJECTION, SOLUTION INTRAMUSCULAR; INTRAVENOUS; SUBCUTANEOUS at 12:22

## 2025-01-05 RX ADMIN — MONTELUKAST SODIUM 10 MILLIGRAM(S): 10 TABLET, FILM COATED ORAL at 11:47

## 2025-01-05 RX ADMIN — FENOFIBRATE 145 MILLIGRAM(S): 48 TABLET ORAL at 11:47

## 2025-01-05 RX ADMIN — Medication 25 MILLIGRAM(S): at 05:57

## 2025-01-05 RX ADMIN — HEPARIN SODIUM 17 UNIT(S)/HR: 1000 INJECTION, SOLUTION INTRAVENOUS; SUBCUTANEOUS at 10:46

## 2025-01-05 RX ADMIN — POTASSIUM CHLORIDE 10 MILLIEQUIVALENT(S): 600 TABLET, FILM COATED, EXTENDED RELEASE ORAL at 11:46

## 2025-01-05 RX ADMIN — Medication 20 MILLIGRAM(S): at 11:46

## 2025-01-05 RX ADMIN — SODIUM CHLORIDE 3 MILLILITER(S): 9 INJECTION, SOLUTION INTRAMUSCULAR; INTRAVENOUS; SUBCUTANEOUS at 05:59

## 2025-01-05 RX ADMIN — SENNOSIDES 2 TABLET(S): 8.6 TABLET, FILM COATED ORAL at 22:07

## 2025-01-05 RX ADMIN — Medication 12.5 MILLIGRAM(S): at 17:22

## 2025-01-05 RX ADMIN — SODIUM CHLORIDE 3 MILLILITER(S): 9 INJECTION, SOLUTION INTRAMUSCULAR; INTRAVENOUS; SUBCUTANEOUS at 22:11

## 2025-01-05 RX ADMIN — TAMSULOSIN HYDROCHLORIDE 0.4 MILLIGRAM(S): 0.4 CAPSULE ORAL at 22:07

## 2025-01-05 NOTE — PHYSICAL THERAPY INITIAL EVALUATION ADULT - PERTINENT HX OF CURRENT PROBLEM, REHAB EVAL
69M, former smoker (Quit 1997) with PMH of BPH, PE (2013), DVT (in 2024), STEPHEN, fatty liver, gout, OA, HTN, HLD, T2DM (on Mounjaro), Stage 3 CKD (baseline Cr 1.5), CAD/NSTEMI s/p stent to RCA (10/4/24 at [facility]), Afib s/p ablation (on Xarelto), LV thrombus s/p open heart thrombus extraction at [facility] (2020), pulmonary hypertension (on Opsumit), HFpEF and symptomatic severe aortic stenosis, deemed a good candidate for TAVR pending dental clearance. Arrived today 1/4/25 as planned admission for medical optimization (including tooth extraction on Monday 1/6/25) prior to TAVR this admission.

## 2025-01-05 NOTE — PHYSICAL THERAPY INITIAL EVALUATION ADULT - IMPAIRMENTS CONTRIBUTING TO GAIT DEVIATIONS, PT EVAL
+impaired endurance; BELTRAN noted after ambulation trial. Symptoms improved after ~3 minutes seated rest break./impaired balance

## 2025-01-05 NOTE — CHART NOTE - NSCHARTNOTEFT_GEN_A_CORE
Pt planned for extraction of indicated teeth in OR at 1330 on Monday 1/6 for dental clearance prior to TAVR planned for 1/9.    - Please make pt NPO at [facility] this evening  - Hold Hep gtt 6 hrs prior to OR  - Pre op labs  - Dental imaging already obtained  - Please continue to medically optimize pt for this procedure - will use lido w/o epi, deep sedation/GA for approx 60 minutes.  - Page OMFS with any further questions or concerns    Cliff Singh DMD  Oral & Maxillofacial Surgery  [phone]  Available on MS Teams

## 2025-01-05 NOTE — PHYSICAL THERAPY INITIAL EVALUATION ADULT - NSPTDISCHREC_GEN_A_CORE
to be determined as patient pending OR for TAVR this week to be determined as patient pending OR for TAVR this week.  If patient is not for TAVR, no skilled PT needs at this time

## 2025-01-05 NOTE — PHYSICAL THERAPY INITIAL EVALUATION ADULT - ADDITIONAL COMMENTS
Limited community ambulator who lives alone in elevator access apartment, no SERAFIN. Independent with all ADLs prior and denies use of AD when ambulating. Does report occasional use of SC for transfers. Endorses BELTRAN after ~5 minutes of ambulation that usually recovers with ~3 minutes rest.

## 2025-01-05 NOTE — PROGRESS NOTE ADULT - ASSESSMENT
69M, former smoker (Quit 1997) with PMH of BPH, PE (2013), DVT (in 2024), STEPHEN, fatty liver, gout, OA, HTN, HLD, T2DM (on Mounjaro), Stage 3 CKD (baseline Cr 1.5), CAD/NSTEMI s/p stent to RCA (10/4/24 [facility]), Afib s/p ablation (on Xarelto), LV thrombus s/p open heart thrombus extraction at [facility] (2020), pulmonary hypertension (on Opsumit), HFpEF and symptomatic severe aortic stenosis, deemed a good candidate for TAVR pending dental clearance. Arrived today 1/4/25 as planned admission for medical optimization (including tooth extraction on Monday 1/6/25) prior to TAVR this admission.     Plan:  Neurovascular:   -stable, no active issues     HEENT:  -Dental Extractions pending for Monday with OMFS    -NPO at midnight     -holding brilinta until post procedure    -low dose ASA today because has recent stent (oct 2024) and can't fully be off antiplatelets     Cardiovascular:   -preop TAVR     -continue with asa (switching from brilinta 2/2 to pending dental extractions)     -continue Met succ 12.5mg Q12 hours  -CAD hx:    -last PCI in October 2024 (exactly 3 months out from stent)    -on brilinta at home, is on hold given pending dental extractions    -replaced brilinta with asa 81mg to remain on low dose antiplatelet going into dental extractions    -discuss with Dr. Tubbs antiplatelet/AC plan post procedure   -Baseline bigkelleyiny came in in this rhythm as direct admission, asymptomatic   -hx of sternotomy LV thrombus extraction 2020   -hx of HFpEF: continue BB   -HLD: statin 10mg daily   -Hemodynamically stable.   -Monitor: BP, HR, tele    Respiratory:   -hx of STEPHEN: will ask pt if uses CPAP at night   -hx of DVT/PE: continue with hep gtt, pending morning PTT     -on xarelto at home, has been held for last 4 days   -pHTN: Macitentan daily (non-formulary, pt med given to pharmacy)  -COPD: Breztri, Montelukast   -Oxygenating well on room air   -Encourage continued use of IS 10x/hr and frequent ambulation  -CXR: no acute pathology     GI:  -elevated triglycerides: fenofibrate   -GI PPX: pantoprazole 40mg daily   -PO Diet: dash/tlc   -Bowel Regimen: senna     Renal / :  -BPH: tamsulosin   -hx of CKD (baseline Cr coming in 1.5)   -BUN/Cr pending today   -Monitor I/O's daily     Endocrine:    -A1c: 5.3   -TSH: 3.3     Hematologic:  -CBC: H/H- 11/35     ID:  -Temperature: afebrile   -CBC: WBC- 6.2     Prophylaxis:  -DVT prophylaxis with hep gtt   -Continue with SCD's b/l while patient is at rest     Disposition:  -dental extractions tomorrow

## 2025-01-06 ENCOUNTER — TRANSCRIPTION ENCOUNTER (OUTPATIENT)
Age: 70
End: 2025-01-06

## 2025-01-06 LAB
ANION GAP SERPL CALC-SCNC: 8 MMOL/L — SIGNIFICANT CHANGE UP (ref 5–17)
ANION GAP SERPL CALC-SCNC: 8 MMOL/L — SIGNIFICANT CHANGE UP (ref 5–17)
APPEARANCE UR: CLEAR — SIGNIFICANT CHANGE UP
APTT BLD: 74.8 SEC — HIGH (ref 24.5–35.6)
BILIRUB UR-MCNC: NEGATIVE — SIGNIFICANT CHANGE UP
BUN SERPL-MCNC: 33 MG/DL — HIGH (ref 7–23)
BUN SERPL-MCNC: 36 MG/DL — HIGH (ref 7–23)
CALCIUM SERPL-MCNC: 9.4 MG/DL — SIGNIFICANT CHANGE UP (ref 8.4–10.5)
CALCIUM SERPL-MCNC: 9.6 MG/DL — SIGNIFICANT CHANGE UP (ref 8.4–10.5)
CHLORIDE SERPL-SCNC: 100 MMOL/L — SIGNIFICANT CHANGE UP (ref 96–108)
CHLORIDE SERPL-SCNC: 100 MMOL/L — SIGNIFICANT CHANGE UP (ref 96–108)
CHOLEST SERPL-MCNC: 104 MG/DL — SIGNIFICANT CHANGE UP
CO2 SERPL-SCNC: 27 MMOL/L — SIGNIFICANT CHANGE UP (ref 22–31)
CO2 SERPL-SCNC: 28 MMOL/L — SIGNIFICANT CHANGE UP (ref 22–31)
COLOR SPEC: YELLOW — SIGNIFICANT CHANGE UP
CREAT SERPL-MCNC: 1.48 MG/DL — HIGH (ref 0.5–1.3)
CREAT SERPL-MCNC: 1.54 MG/DL — HIGH (ref 0.5–1.3)
DIFF PNL FLD: ABNORMAL
EGFR: 49 ML/MIN/1.73M2 — LOW
EGFR: 51 ML/MIN/1.73M2 — LOW
GLUCOSE SERPL-MCNC: 108 MG/DL — HIGH (ref 70–99)
GLUCOSE SERPL-MCNC: 97 MG/DL — SIGNIFICANT CHANGE UP (ref 70–99)
GLUCOSE UR QL: NEGATIVE MG/DL — SIGNIFICANT CHANGE UP
HCT VFR BLD CALC: 35.5 % — LOW (ref 39–50)
HDLC SERPL-MCNC: 48 MG/DL — SIGNIFICANT CHANGE UP
HGB BLD-MCNC: 11.4 G/DL — LOW (ref 13–17)
INR BLD: 1.1 — SIGNIFICANT CHANGE UP (ref 0.85–1.16)
KETONES UR-MCNC: NEGATIVE MG/DL — SIGNIFICANT CHANGE UP
LEUKOCYTE ESTERASE UR-ACNC: NEGATIVE — SIGNIFICANT CHANGE UP
LIPID PNL WITH DIRECT LDL SERPL: 41 MG/DL — SIGNIFICANT CHANGE UP
MAGNESIUM SERPL-MCNC: 1.9 MG/DL — SIGNIFICANT CHANGE UP (ref 1.6–2.6)
MCHC RBC-ENTMCNC: 29.4 PG — SIGNIFICANT CHANGE UP (ref 27–34)
MCHC RBC-ENTMCNC: 32.1 G/DL — SIGNIFICANT CHANGE UP (ref 32–36)
MCV RBC AUTO: 91.5 FL — SIGNIFICANT CHANGE UP (ref 80–100)
NITRITE UR-MCNC: NEGATIVE — SIGNIFICANT CHANGE UP
NON HDL CHOLESTEROL: 56 MG/DL — SIGNIFICANT CHANGE UP
NRBC # BLD: 0 /100 WBCS — SIGNIFICANT CHANGE UP (ref 0–0)
PH UR: 5.5 — SIGNIFICANT CHANGE UP (ref 5–8)
PLATELET # BLD AUTO: 161 K/UL — SIGNIFICANT CHANGE UP (ref 150–400)
POTASSIUM SERPL-MCNC: 4.9 MMOL/L — SIGNIFICANT CHANGE UP (ref 3.5–5.3)
POTASSIUM SERPL-MCNC: 5.3 MMOL/L — SIGNIFICANT CHANGE UP (ref 3.5–5.3)
POTASSIUM SERPL-SCNC: 4.9 MMOL/L — SIGNIFICANT CHANGE UP (ref 3.5–5.3)
POTASSIUM SERPL-SCNC: 5.3 MMOL/L — SIGNIFICANT CHANGE UP (ref 3.5–5.3)
PROT UR-MCNC: NEGATIVE MG/DL — SIGNIFICANT CHANGE UP
PROTHROM AB SERPL-ACNC: 12.9 SEC — SIGNIFICANT CHANGE UP (ref 9.9–13.4)
RBC # BLD: 3.88 M/UL — LOW (ref 4.2–5.8)
RBC # FLD: 13.7 % — SIGNIFICANT CHANGE UP (ref 10.3–14.5)
SODIUM SERPL-SCNC: 135 MMOL/L — SIGNIFICANT CHANGE UP (ref 135–145)
SODIUM SERPL-SCNC: 136 MMOL/L — SIGNIFICANT CHANGE UP (ref 135–145)
SP GR SPEC: 1.01 — SIGNIFICANT CHANGE UP (ref 1–1.03)
TRIGL SERPL-MCNC: 70 MG/DL — SIGNIFICANT CHANGE UP
UROBILINOGEN FLD QL: 0.2 MG/DL — SIGNIFICANT CHANGE UP (ref 0.2–1)
WBC # BLD: 5.39 K/UL — SIGNIFICANT CHANGE UP (ref 3.8–10.5)
WBC # FLD AUTO: 5.39 K/UL — SIGNIFICANT CHANGE UP (ref 3.8–10.5)

## 2025-01-06 PROCEDURE — 93010 ELECTROCARDIOGRAM REPORT: CPT

## 2025-01-06 PROCEDURE — 99232 SBSQ HOSP IP/OBS MODERATE 35: CPT

## 2025-01-06 DEVICE — SURGIFOAM PAD 8CM X 12.5CM X 10MM (100)
Type: IMPLANTABLE DEVICE | Status: NON-FUNCTIONAL
Removed: 2025-01-06

## 2025-01-06 RX ORDER — TAMSULOSIN HYDROCHLORIDE 0.4 MG/1
0.4 CAPSULE ORAL AT BEDTIME
Refills: 0 | Status: DISCONTINUED | OUTPATIENT
Start: 2025-01-06 | End: 2025-01-13

## 2025-01-06 RX ORDER — ACETAMINOPHEN 80 MG/.8ML
650 SOLUTION/ DROPS ORAL EVERY 6 HOURS
Refills: 0 | Status: DISCONTINUED | OUTPATIENT
Start: 2025-01-06 | End: 2025-01-06

## 2025-01-06 RX ORDER — PANTOPRAZOLE 40 MG/1
40 TABLET, DELAYED RELEASE ORAL
Refills: 0 | Status: DISCONTINUED | OUTPATIENT
Start: 2025-01-06 | End: 2025-01-13

## 2025-01-06 RX ORDER — FUROSEMIDE 20 MG
20 TABLET ORAL ONCE
Refills: 0 | Status: COMPLETED | OUTPATIENT
Start: 2025-01-06 | End: 2025-01-06

## 2025-01-06 RX ORDER — ASPIRIN 81 MG
81 TABLET, DELAYED RELEASE (ENTERIC COATED) ORAL DAILY
Refills: 0 | Status: DISCONTINUED | OUTPATIENT
Start: 2025-01-06 | End: 2025-01-09

## 2025-01-06 RX ORDER — FENOFIBRATE 48 MG/1
145 TABLET ORAL DAILY
Refills: 0 | Status: DISCONTINUED | OUTPATIENT
Start: 2025-01-06 | End: 2025-01-13

## 2025-01-06 RX ORDER — ATORVASTATIN CALCIUM 40 MG/1
10 TABLET, FILM COATED ORAL AT BEDTIME
Refills: 0 | Status: DISCONTINUED | OUTPATIENT
Start: 2025-01-06 | End: 2025-01-12

## 2025-01-06 RX ORDER — METOPROLOL TARTRATE 50 MG
12.5 TABLET ORAL EVERY 12 HOURS
Refills: 0 | Status: DISCONTINUED | OUTPATIENT
Start: 2025-01-06 | End: 2025-01-10

## 2025-01-06 RX ORDER — SENNOSIDES 8.6 MG/1
2 TABLET, FILM COATED ORAL AT BEDTIME
Refills: 0 | Status: DISCONTINUED | OUTPATIENT
Start: 2025-01-06 | End: 2025-01-13

## 2025-01-06 RX ORDER — MACITENTAN 10 MG/1
10 TABLET, FILM COATED ORAL DAILY
Refills: 0 | Status: DISCONTINUED | OUTPATIENT
Start: 2025-01-06 | End: 2025-01-13

## 2025-01-06 RX ORDER — OXYCODONE HCL 15 MG
5 TABLET ORAL EVERY 6 HOURS
Refills: 0 | Status: DISCONTINUED | OUTPATIENT
Start: 2025-01-06 | End: 2025-01-13

## 2025-01-06 RX ORDER — ACETAMINOPHEN 80 MG/.8ML
650 SOLUTION/ DROPS ORAL EVERY 6 HOURS
Refills: 0 | Status: DISCONTINUED | OUTPATIENT
Start: 2025-01-06 | End: 2025-01-13

## 2025-01-06 RX ORDER — POTASSIUM CHLORIDE 600 MG/1
10 TABLET, FILM COATED, EXTENDED RELEASE ORAL DAILY
Refills: 0 | Status: DISCONTINUED | OUTPATIENT
Start: 2025-01-06 | End: 2025-01-08

## 2025-01-06 RX ORDER — FUROSEMIDE 20 MG
20 TABLET ORAL DAILY
Refills: 0 | Status: DISCONTINUED | OUTPATIENT
Start: 2025-01-06 | End: 2025-01-07

## 2025-01-06 RX ORDER — MONTELUKAST SODIUM 10 MG/1
10 TABLET, FILM COATED ORAL DAILY
Refills: 0 | Status: DISCONTINUED | OUTPATIENT
Start: 2025-01-06 | End: 2025-01-13

## 2025-01-06 RX ADMIN — ACETAMINOPHEN 650 MILLIGRAM(S): 80 SOLUTION/ DROPS ORAL at 08:58

## 2025-01-06 RX ADMIN — ACETAMINOPHEN 650 MILLIGRAM(S): 80 SOLUTION/ DROPS ORAL at 09:45

## 2025-01-06 RX ADMIN — Medication 800 MILLIGRAM(S): at 08:09

## 2025-01-06 RX ADMIN — SENNOSIDES 2 TABLET(S): 8.6 TABLET, FILM COATED ORAL at 21:54

## 2025-01-06 RX ADMIN — Medication 20 MILLIGRAM(S): at 08:08

## 2025-01-06 RX ADMIN — MACITENTAN 10 MILLIGRAM(S): 10 TABLET, FILM COATED ORAL at 11:20

## 2025-01-06 RX ADMIN — MONTELUKAST SODIUM 10 MILLIGRAM(S): 10 TABLET, FILM COATED ORAL at 11:18

## 2025-01-06 RX ADMIN — TAMSULOSIN HYDROCHLORIDE 0.4 MILLIGRAM(S): 0.4 CAPSULE ORAL at 21:54

## 2025-01-06 RX ADMIN — Medication 12.5 MILLIGRAM(S): at 11:24

## 2025-01-06 RX ADMIN — Medication 81 MILLIGRAM(S): at 11:19

## 2025-01-06 RX ADMIN — SODIUM CHLORIDE 3 MILLILITER(S): 9 INJECTION, SOLUTION INTRAMUSCULAR; INTRAVENOUS; SUBCUTANEOUS at 12:27

## 2025-01-06 RX ADMIN — Medication 12.5 MILLIGRAM(S): at 21:57

## 2025-01-06 RX ADMIN — POTASSIUM CHLORIDE 10 MILLIEQUIVALENT(S): 600 TABLET, FILM COATED, EXTENDED RELEASE ORAL at 11:19

## 2025-01-06 RX ADMIN — FENOFIBRATE 145 MILLIGRAM(S): 48 TABLET ORAL at 11:19

## 2025-01-06 RX ADMIN — SODIUM CHLORIDE 3 MILLILITER(S): 9 INJECTION, SOLUTION INTRAMUSCULAR; INTRAVENOUS; SUBCUTANEOUS at 06:31

## 2025-01-06 RX ADMIN — ATORVASTATIN CALCIUM 10 MILLIGRAM(S): 40 TABLET, FILM COATED ORAL at 21:54

## 2025-01-06 RX ADMIN — Medication 20 MILLIGRAM(S): at 11:24

## 2025-01-06 NOTE — PRE-ANESTHESIA EVALUATION ADULT - NSANTHDIETYNSD_GEN_ALL_CORE
Neurology Consult Note    HPI:  42 y/o man w/ PMH migraine w/ aura since childhood and recent diagnosis of Shingles on 7/23 p/w headache since 4:30 AM. Headache felt like a tight sensation and located in a band across his head. Had a progressive onset, associated with nausea, vomiting, and photophobia. Pain feels better when he lays on his side. At its worst, pain was 8-9/10, is now 5-6/10. Currently, the pain is isolated to behind his eyes. Last migraine was 5 years ago. Headaches are usually relieved by 4 ibuprofen. Patient took IM sumatriptan at home with some relief, however headache returned. As pain was not relieved, he came to ER, where he took Advil at approximately 12:30. Pt's wife endorses he has been working hard recently and has had stressors due to this. He denies any focal numbness, weakness, visual changes, mental status changes. Denies fever, though does endorse chills. No neck pain or stiffness. No recent sick contacts. Pt's shingles rash is located on his lower back.     Review of Systems:  Constitutional:  As above      Eyes, Ears, Mouth, Throat: Negative  Respiratory: Negative                           Cardiovascular: Negative  Gastrointestinal: Negative                                    Genitourinary: Negative  Musculoskeletal: Lower back musculoskeletal pain                                   Dermatologic: As above  Neurological: as per above                                                                 Psychiatric: Negative  Endocrine: Negative             Hematologic/Lymphatic: Negative    MEDICATIONS  (STANDING):  valproate sodium IVPB 500 milliGRAM(s) IV Intermittent Once    MEDICATIONS  (PRN):    All: NKDA  Meds: Sumatriptan, advil PRN  PSH: Hernia repair  PMH: As above  FH: NC  SH: Social EtOH use ( 1 drink per week), denies cigarette smoking or other substance use      Objective:   Vital Signs Last 24 Hrs  T(C): 36.5 (24 Jul 2017 12:28), Max: 36.5 (24 Jul 2017 12:28)  T(F): 97.7 (24 Jul 2017 12:28), Max: 97.7 (24 Jul 2017 12:28)  HR: 74 (24 Jul 2017 12:31) (71 - 74)  BP: 146/87 (24 Jul 2017 12:31) (128/59 - 146/87)  BP(mean): --  RR: 16 (24 Jul 2017 12:31) (16 - 18)  SpO2: 100% (24 Jul 2017 12:31) (100% - 100%)    General Adult Exam  GENERAL APPEARANCE: Well developed, well nourished, alert and cooperative, and appears to be in no acute distress.  EYES: PERRL, EOMI. No papilledema on fundoscopy.  EARS: External auditory canals and tympanic membranes clear, hearing grossly intact.  NOSE: No nasal discharge.  NECK: Supple, negative Kernig's and Brudzinski's.  CARDIAC: Normal S1 and S2. No S3, S4 or murmurs. Rhythm is regular. There is no peripheral edema, cyanosis or pallor.   LUNGS: Clear to auscultation and percussion without rales, rhonchi, wheezing or diminished breath sounds.  ABDOMEN:  Soft, nondistended, nontender.  EXTREMITIES: No significant deformity or joint abnormality. No edema. Peripheral pulses intact. No varicosities.  SKIN: + vesicular rash on posterior L1 dermatomal distribution    Neurological Exam:  Mental Status: AAOx3, speech fluent, follows complex commands across midline, repetition intact    Cranial Nerves: PERRL, EOMI, VFF, CN V1-3 intact to light touch and pinprick.  No facial asymmetry.  Tongue, uvula and palate midline. No dysarthria.    Motor:   Tone: normal.                  Strength:     [] Upper extremity                      Delt       Bicep    Tricep                                                  R         5/5        5/5        5/5       5/5                                               L          5/5        5/5        5/5       5/5  [] Lower extremity                       HF          KE          KF        DF         PF                                               R        5/5        5/5        5/5       5/5       5/5                                               L         5/5        5/5       5/5       5/5        5/5      Sensation: intact to light touch x 4 extremities    Deep Tendon Reflexes: 2+ bilateral biceps, triceps, brachioradialis, knee and ankle    Toes flexor bilaterally    Coord: FTN no ataxia/dysmetria      Other:      07-24                          15.2   6.7   )-----------( 175      ( 24 Jul 2017 14:18 )             46.0     139  |  100  |  16  ----------------------------<  111<H>  4.2   |  23  |  0.91    Ca    9.7      24 Jul 2017 14:18    TPro  8.0  /  Alb  4.7  /  TBili  0.3  /  DBili  x   /  AST  22  /  ALT  27  /  AlkPhos  49  07-24    LIVER FUNCTIONS - ( 24 Jul 2017 14:18 )  Alb: 4.7 g/dL / Pro: 8.0 g/dL / ALK PHOS: 49 U/L / ALT: 27 U/L RC / AST: 22 U/L / GGT: x
Yes
Yes
Neurology Consult Note    HPI:  44 y/o man w/ PMH migraine w/ aura since childhood and recent diagnosis of Shingles on 7/23, recent work stress, p/w headache since 4:30 AM. Headache felt like a tight sensation and located in a band across his head. Had a progressive onset, associated with nausea, vomiting, and photophobia. Pain feels better when he lays on his side. At its worst, pain was 8-9/10, is now 5-6/10. Currently, the pain is isolated to behind his eyes. Last serious migraine was couple of years ago. Headaches are usually relieved by 4 ibuprofen. Patient took IM sumatriptan at home with some relief, however headache returned. As pain was not relieved, he came to ER, where he took Advil at approximately 12:30. Pt's wife endorses he has been working hard recently and has had stressors due to this. He denies any focal numbness, weakness, visual changes, mental status changes. Denies fever, though does endorse chills. No neck pain or stiffness. No recent sick contacts. Pt's shingles rash is located on his lower back.     Review of Systems:  Constitutional:  As above      Eyes, Ears, Mouth, Throat: Negative  Respiratory: Negative                           Cardiovascular: Negative  Gastrointestinal: Negative                                    Genitourinary: Negative  Musculoskeletal: Lower back musculoskeletal pain                                   Dermatologic: As above  Neurological: as per above                                                                 Psychiatric: Negative  Endocrine: Negative             Hematologic/Lymphatic: Negative    MEDICATIONS  (STANDING):  valproate sodium IVPB 500 milliGRAM(s) IV Intermittent Once    MEDICATIONS  (PRN):    All: NKDA  Meds: Sumatriptan, advil PRN  PSH: Hernia repair  PMH: As above  FH: NC  SH: Social EtOH use ( 1 drink per week), denies cigarette smoking or other substance use    Objective:   Vital Signs Last 24 Hrs  T(C): 36.5 (24 Jul 2017 12:28), Max: 36.5 (24 Jul 2017 12:28)  T(F): 97.7 (24 Jul 2017 12:28), Max: 97.7 (24 Jul 2017 12:28)  HR: 74 (24 Jul 2017 12:31) (71 - 74)  BP: 146/87 (24 Jul 2017 12:31) (128/59 - 146/87)  RR: 16 (24 Jul 2017 12:31) (16 - 18)  SpO2: 100% (24 Jul 2017 12:31) (100% - 100%)    General Adult Exam  GENERAL APPEARANCE: Well developed, well nourished, alert and cooperative, and appears to be in no acute distress.  EYES: PERRL, EOMI. No papilledema on fundoscopy.  EARS: External auditory canals and tympanic membranes clear, hearing grossly intact.  NOSE: No nasal discharge.  NECK: Supple, negative Kernig's and Brudzinski's.  CARDIAC: Normal S1 and S2. No S3, S4 or murmurs. Rhythm is regular. There is no peripheral edema, cyanosis or pallor.   LUNGS: Clear to auscultation and percussion without rales, rhonchi, wheezing or diminished breath sounds.  ABDOMEN:  Soft, nondistended, nontender.  EXTREMITIES: No significant deformity or joint abnormality. No edema. Peripheral pulses intact. No varicosities.  SKIN: + vesicular rash on posterior L1 dermatomal distribution    Neurological Exam:  Mental Status: AAOx3, speech fluent, follows complex commands across midline, repetition intact    Cranial Nerves: PERRL, EOMI, VFF, CN V1-3 intact to light touch and pinprick.  No facial asymmetry.  Tongue, uvula and palate midline. No dysarthria.    Motor:   Tone: normal.                  Strength:     [] Upper extremity                      Delt       Bicep    Tricep                                                  R         5/5        5/5        5/5       5/5                                               L          5/5        5/5        5/5       5/5  [] Lower extremity                       HF          KE          KF        DF         PF                                               R        5/5        5/5        5/5       5/5       5/5                                               L         5/5        5/5       5/5       5/5        5/5      Sensation: intact to light touch x 4 extremities    Deep Tendon Reflexes: 2+ bilateral biceps, triceps, brachioradialis, knee and ankle    Toes flexor bilaterally    Coord: FTN no ataxia/dysmetria      Other:    07-24                 15.2   6.7   )-----------( 175      ( 24 Jul 2017 14:18 )             46.0     139  |  100  |  16  ----------------------------<  111<H>  4.2   |  23  |  0.91    Ca    9.7      24 Jul 2017 14:18    TPro  8.0  /  Alb  4.7  /  TBili  0.3  /  DBili  x   /  AST  22  /  ALT  27  /  AlkPhos  49  07-24    LIVER FUNCTIONS - ( 24 Jul 2017 14:18 )  Alb: 4.7 g/dL / Pro: 8.0 g/dL / ALK PHOS: 49 U/L / ALT: 27 U/L RC / AST: 22 U/L

## 2025-01-06 NOTE — PROGRESS NOTE ADULT - ASSESSMENT
69M, former smoker (Quit 1997) with PMH of BPH, PE (2013), DVT (in 2024), STEPHEN, fatty liver, gout, OA, HTN, HLD, T2DM (on Mounjaro), Stage 3 CKD (baseline Cr 1.5), CAD/NSTEMI s/p stent to RCA (10/4/24 at [facility]), Afib s/p ablation (on Xarelto), LV thrombus s/p open heart thrombus extraction at [facility] (2020), pulmonary hypertension (on Opsumit), HFpEF and symptomatic severe aortic stenosis, deemed a good candidate for TAVR pending dental clearance. Arrived today 1/4/25 as planned admission for medical optimization (including tooth extraction on Monday 1/6/25) prior to TAVR this admission. 1/6 ongoing bigeminy PVCs, persisting since admission - uncertain of baseline. Tooth extraction today with OMFS. Heparin and brillinta on hold for procedure.      Neuro:   No delirium and focal deficits.   Pain:    HEENT  1/6 Dental Extractions with OMFS    -NPO at midnight     -holding brilinta until post procedure    -low dose ASA today because has recent stent (oct 2024) and can't fully be off antiplatelets     Cardio:  Severe AS pre-op TAVR    - Continue with ASA   - c/w bb 12.5   CAD s/p PCI 5/2024   - c/w ASA, holding brilinta   HTN: c/w bb 12.5  HLD: c/w 10 mg statin   Hx of sternotomy with LV thrombus extraction 2020  HFpEF (?)   - c/w BB   Vitals over the last 24 hrs:    HR:     BP:      Pulm:   POD ** s/p **  IS encouraged. Sating at ***  CXR:    GI:   Tolerating diet well   Prophylaxis: 40 mg pantoprazole. ***  Bowel: Senna and PEG. ***     ID:   WBC ***. Afebrile/Febrile.  Monitor fever curve     Renal:   BUN/Creat @ **  I/O net:   Electrolytes: Replete electrolytes prn.     Hem:   H&H @   DVT prophylaxis: SubQ Heparin ***    Endo:   A1C:  TSH:    MSK:   Ambulating well. Continue with PT/OT. ***    Disposition:   Continue with inpatient care. ***   69M, former smoker (Quit 1997) with PMH of BPH, PE (2013), DVT (in 2024), STEPHEN, fatty liver, gout, OA, HTN, HLD, T2DM (on Mounjaro), Stage 3 CKD (baseline Cr 1.5), CAD/NSTEMI s/p stent to RCA (10/4/24 at [facility]), Afib s/p ablation (on Xarelto), LV thrombus s/p open heart thrombus extraction at [facility] (2020), pulmonary hypertension (on Opsumit), HFpEF and symptomatic severe aortic stenosis, deemed a good candidate for TAVR pending dental clearance. Arrived today 1/4/25 as planned admission for medical optimization (including tooth extraction on Monday 1/6/25) prior to TAVR this admission. 1/6 ongoing bigeminy PVCs, persisting since admission - uncertain of baseline. Tooth extraction today with OMFS. Heparin and brillinta on hold for procedure. Additional 20 mg IV lasix gave.     Neuro:   No delirium and focal deficits.   Pain: prn tylenol     HEENT  1/6 Dental Extractions with OMFS    -NPO at midnight     -holding brilinta until post procedure    -low dose ASA today because has recent stent (oct 2024) and can't fully be off antiplatelets     Cardio:  Severe AS pre-op TAVR    - Continue with ASA   - c/w bb 12.5   CAD s/p PCI 5/2024   - c/w ASA, holding brilinta   Afib:    - No in Afib, Hx of and on xarelto at home.    - Heparin drip on hold   HTN: c/w bb 12.5  HLD: c/w 10 mg statin and fenofibrate   Hx of sternotomy with LV thrombus extraction 2020  Vitals stable over the last 24 hrs     Pulm:   DVT/PE (2024):   - continue hep gtt after surgery, on hold for now  Pulmonary Hypertension:    -  c/w macitentan   COPD: Breztri, Montelukast   IS encouraged. Sating at 98% on RA  CXR: Mild pulmonary congestion     GI:   Tolerating diet well   Prophylaxis: 40 mg pantoprazole.   Bowel: Senna and PEG.      ID:   WBC 5. Afebrile/Febrile.  Monitor fever curve     Renal:   CKD (baseline cr 1.5)  BUN/Creat @ 36/1.54  I/O net: -1.6 L  Electrolytes: Replete electrolytes prn.     Hem:   H&H @ 11/35  DVT prophylaxis: On therapeutic heparin drip     Endo:   A1C: 5.3  TSH: 3.3    MSK:   Ambulating well. Continue with PT/OT.     Disposition:   Continue with inpatient care. Dental extraction today. Continue medical optimization for TAVR 1/9   69M, former smoker (Quit 1997) with PMH of BPH, PE (2013), DVT (in 2024), STEPHEN, fatty liver, gout, OA, HTN, HLD, T2DM (on Mounjaro), Stage 3 CKD (baseline Cr 1.5), CAD/NSTEMI s/p stent to RCA (10/4/24 at [facility]), Afib s/p ablation (on Xarelto), LV thrombus s/p open heart thrombus extraction at [facility] (2020), pulmonary hypertension (on Opsumit), HFpEF and symptomatic severe aortic stenosis, deemed a good candidate for TAVR pending dental clearance. Arrived today 1/4/25 as planned admission for medical optimization (including tooth extraction on Monday 1/6/25) prior to TAVR this admission. 1/6 ongoing bigeminy PVCs, persisting since admission - uncertain of baseline. Tooth extraction today with OMFS. Heparin and brillinta on hold for procedure, re-start AC tomorrow per OMFS. Additional 20 mg IV lasix gave. Soft chew diet. No spitting or straws.     Neuro:   No delirium and focal deficits.   Pain: prn tylenol     HEENT  1/6 Dental Extractions with OMFS    -NPO at midnight     -holding brilinta until tomorrow     -low dose ASA today because has recent stent (oct 2024) and can't fully be off antiplatelets     Cardio:  Severe AS pre-op TAVR    - Continue with ASA   - c/w bb 12.5   CAD s/p PCI 5/2024   - c/w ASA, holding brilinta   Afib:    - No in Afib, Hx of and on xarelto at home.    - Heparin drip on hold   HTN: c/w bb 12.5  HLD: c/w 10 mg statin and fenofibrate   Hx of sternotomy with LV thrombus extraction 2020  Vitals stable over the last 24 hrs     Pulm:   DVT/PE (2024):   - continue hep gtt after surgery, on hold for now  Pulmonary Hypertension:    -  c/w macitentan   COPD: Breztri, Montelukast   IS encouraged. Sating at 98% on RA  CXR: Mild pulmonary congestion     GI:   Tolerating diet well   Prophylaxis: 40 mg pantoprazole.   Bowel: Senna and PEG.      ID:   WBC 5. Afebrile/Febrile.  Monitor fever curve     Renal:   CKD (baseline cr 1.5)  BUN/Creat @ 36/1.54  I/O net: -1.6 L  Electrolytes: Replete electrolytes prn.     Hem:   H&H @ 11/35  DVT prophylaxis: On therapeutic heparin drip     Endo:   A1C: 5.3  TSH: 3.3    MSK:   Ambulating well. Continue with PT/OT.     Disposition:   Continue with inpatient care. Dental extraction today. Continue medical optimization for TAVR 1/9

## 2025-01-07 LAB
ANION GAP SERPL CALC-SCNC: 6 MMOL/L — SIGNIFICANT CHANGE UP (ref 5–17)
ANION GAP SERPL CALC-SCNC: 9 MMOL/L — SIGNIFICANT CHANGE UP (ref 5–17)
APTT BLD: 31.5 SEC — SIGNIFICANT CHANGE UP (ref 24.5–35.6)
APTT BLD: 40.3 SEC — HIGH (ref 24.5–35.6)
APTT BLD: 53 SEC — HIGH (ref 24.5–35.6)
BUN SERPL-MCNC: 38 MG/DL — HIGH (ref 7–23)
BUN SERPL-MCNC: 39 MG/DL — HIGH (ref 7–23)
CALCIUM SERPL-MCNC: 9.5 MG/DL — SIGNIFICANT CHANGE UP (ref 8.4–10.5)
CALCIUM SERPL-MCNC: 9.7 MG/DL — SIGNIFICANT CHANGE UP (ref 8.4–10.5)
CHLORIDE SERPL-SCNC: 97 MMOL/L — SIGNIFICANT CHANGE UP (ref 96–108)
CHLORIDE SERPL-SCNC: 98 MMOL/L — SIGNIFICANT CHANGE UP (ref 96–108)
CO2 SERPL-SCNC: 29 MMOL/L — SIGNIFICANT CHANGE UP (ref 22–31)
CO2 SERPL-SCNC: 31 MMOL/L — SIGNIFICANT CHANGE UP (ref 22–31)
CREAT SERPL-MCNC: 1.85 MG/DL — HIGH (ref 0.5–1.3)
CREAT SERPL-MCNC: 1.94 MG/DL — HIGH (ref 0.5–1.3)
EGFR: 37 ML/MIN/1.73M2 — LOW
EGFR: 39 ML/MIN/1.73M2 — LOW
GLUCOSE SERPL-MCNC: 100 MG/DL — HIGH (ref 70–99)
GLUCOSE SERPL-MCNC: 112 MG/DL — HIGH (ref 70–99)
HCT VFR BLD CALC: 36.4 % — LOW (ref 39–50)
HGB BLD-MCNC: 11.6 G/DL — LOW (ref 13–17)
INR BLD: 1.05 — SIGNIFICANT CHANGE UP (ref 0.85–1.16)
MAGNESIUM SERPL-MCNC: 2 MG/DL — SIGNIFICANT CHANGE UP (ref 1.6–2.6)
MCHC RBC-ENTMCNC: 29.2 PG — SIGNIFICANT CHANGE UP (ref 27–34)
MCHC RBC-ENTMCNC: 31.9 G/DL — LOW (ref 32–36)
MCV RBC AUTO: 91.7 FL — SIGNIFICANT CHANGE UP (ref 80–100)
NRBC # BLD: 0 /100 WBCS — SIGNIFICANT CHANGE UP (ref 0–0)
PHOSPHATE SERPL-MCNC: 4.6 MG/DL — HIGH (ref 2.5–4.5)
PLATELET # BLD AUTO: 177 K/UL — SIGNIFICANT CHANGE UP (ref 150–400)
POTASSIUM SERPL-MCNC: 5 MMOL/L — SIGNIFICANT CHANGE UP (ref 3.5–5.3)
POTASSIUM SERPL-MCNC: 5.2 MMOL/L — SIGNIFICANT CHANGE UP (ref 3.5–5.3)
POTASSIUM SERPL-SCNC: 5 MMOL/L — SIGNIFICANT CHANGE UP (ref 3.5–5.3)
POTASSIUM SERPL-SCNC: 5.2 MMOL/L — SIGNIFICANT CHANGE UP (ref 3.5–5.3)
PROTHROM AB SERPL-ACNC: 12.1 SEC — SIGNIFICANT CHANGE UP (ref 9.9–13.4)
RBC # BLD: 3.97 M/UL — LOW (ref 4.2–5.8)
RBC # FLD: 13.7 % — SIGNIFICANT CHANGE UP (ref 10.3–14.5)
SODIUM SERPL-SCNC: 135 MMOL/L — SIGNIFICANT CHANGE UP (ref 135–145)
SODIUM SERPL-SCNC: 135 MMOL/L — SIGNIFICANT CHANGE UP (ref 135–145)
WBC # BLD: 6.23 K/UL — SIGNIFICANT CHANGE UP (ref 3.8–10.5)
WBC # FLD AUTO: 6.23 K/UL — SIGNIFICANT CHANGE UP (ref 3.8–10.5)

## 2025-01-07 PROCEDURE — 71045 X-RAY EXAM CHEST 1 VIEW: CPT | Mod: 26

## 2025-01-07 RX ORDER — SODIUM CHLORIDE 9 MG/ML
3 INJECTION, SOLUTION INTRAMUSCULAR; INTRAVENOUS; SUBCUTANEOUS EVERY 8 HOURS
Refills: 0 | Status: DISCONTINUED | OUTPATIENT
Start: 2025-01-07 | End: 2025-01-11

## 2025-01-07 RX ORDER — TRANEXAMIC ACID 650 MG/1
5 TABLET ORAL ONCE
Refills: 0 | Status: DISCONTINUED | OUTPATIENT
Start: 2025-01-07 | End: 2025-01-09

## 2025-01-07 RX ORDER — TRANEXAMIC ACID 650 MG/1
1 TABLET ORAL ONCE
Refills: 0 | Status: DISCONTINUED | OUTPATIENT
Start: 2025-01-07 | End: 2025-01-07

## 2025-01-07 RX ORDER — HEPARIN SODIUM 1000 [USP'U]/ML
1500 INJECTION, SOLUTION INTRAVENOUS; SUBCUTANEOUS
Qty: 25000 | Refills: 0 | Status: DISCONTINUED | OUTPATIENT
Start: 2025-01-07 | End: 2025-01-09

## 2025-01-07 RX ORDER — SODIUM ZIRCONIUM CYCLOSILICATE 10 G/10G
5 POWDER, FOR SUSPENSION ORAL ONCE
Refills: 0 | Status: COMPLETED | OUTPATIENT
Start: 2025-01-07 | End: 2025-01-07

## 2025-01-07 RX ADMIN — Medication 5 MILLIGRAM(S): at 02:05

## 2025-01-07 RX ADMIN — HEPARIN SODIUM 15 UNIT(S)/HR: 1000 INJECTION, SOLUTION INTRAVENOUS; SUBCUTANEOUS at 08:02

## 2025-01-07 RX ADMIN — SENNOSIDES 2 TABLET(S): 8.6 TABLET, FILM COATED ORAL at 21:51

## 2025-01-07 RX ADMIN — HEPARIN SODIUM 16 UNIT(S)/HR: 1000 INJECTION, SOLUTION INTRAVENOUS; SUBCUTANEOUS at 21:53

## 2025-01-07 RX ADMIN — ACETAMINOPHEN 650 MILLIGRAM(S): 80 SOLUTION/ DROPS ORAL at 02:05

## 2025-01-07 RX ADMIN — POTASSIUM CHLORIDE 10 MILLIEQUIVALENT(S): 600 TABLET, FILM COATED, EXTENDED RELEASE ORAL at 11:20

## 2025-01-07 RX ADMIN — TAMSULOSIN HYDROCHLORIDE 0.4 MILLIGRAM(S): 0.4 CAPSULE ORAL at 21:51

## 2025-01-07 RX ADMIN — Medication 12.5 MILLIGRAM(S): at 17:13

## 2025-01-07 RX ADMIN — ACETAMINOPHEN 650 MILLIGRAM(S): 80 SOLUTION/ DROPS ORAL at 03:05

## 2025-01-07 RX ADMIN — SODIUM CHLORIDE 3 MILLILITER(S): 9 INJECTION, SOLUTION INTRAMUSCULAR; INTRAVENOUS; SUBCUTANEOUS at 21:32

## 2025-01-07 RX ADMIN — FENOFIBRATE 145 MILLIGRAM(S): 48 TABLET ORAL at 11:22

## 2025-01-07 RX ADMIN — Medication 81 MILLIGRAM(S): at 11:20

## 2025-01-07 RX ADMIN — SODIUM ZIRCONIUM CYCLOSILICATE 5 GRAM(S): 10 POWDER, FOR SUSPENSION ORAL at 11:32

## 2025-01-07 RX ADMIN — MONTELUKAST SODIUM 10 MILLIGRAM(S): 10 TABLET, FILM COATED ORAL at 11:21

## 2025-01-07 RX ADMIN — ATORVASTATIN CALCIUM 10 MILLIGRAM(S): 40 TABLET, FILM COATED ORAL at 21:51

## 2025-01-07 RX ADMIN — Medication 20 MILLIGRAM(S): at 11:21

## 2025-01-07 RX ADMIN — MACITENTAN 10 MILLIGRAM(S): 10 TABLET, FILM COATED ORAL at 11:22

## 2025-01-07 RX ADMIN — SODIUM CHLORIDE 3 MILLILITER(S): 9 INJECTION, SOLUTION INTRAMUSCULAR; INTRAVENOUS; SUBCUTANEOUS at 13:05

## 2025-01-07 RX ADMIN — Medication 5 MILLIGRAM(S): at 03:05

## 2025-01-07 NOTE — PROGRESS NOTE ADULT - ASSESSMENT
69M, former smoker (Quit 1997) with PMH of BPH, PE (2013), DVT (in 2024), STEPHEN, fatty liver, gout, OA, HTN, HLD, T2DM (on Mounjaro), Stage 3 CKD (baseline Cr 1.5), CAD/NSTEMI s/p stent to RCA (10/4/24 at [facility]), Afib s/p ablation (on Xarelto), LV thrombus s/p open heart thrombus extraction at [facility] (2020), pulmonary hypertension (on Opsumit), HFpEF and symptomatic severe aortic stenosis, who was deemed a good candidate for TAVR pending tooth extraction. On 1/4/24 he arrvied to [facility] for preop optimization prior to procedure. On 1/6/24 he underwent a tooth extraction with OMFS. Additional lasix IVP given. 1/7 heparin gtt restarted per OMFS. Brilinta on hold (plan to switch to eliquis/plavix postop). Soft and bite sized advanced to regular diet today. Plan for TAVR on Thursday, 1/9.    Plan:    Neurovascular:   -Pain well controlled with current regimen. PRN's: continue tylenol/oxy 5    HEENT  1/6 tooth extraction  -diet advanced per OMFS  -restarted heparin gtt    Cardiovascular:   preop TAVR 1/9  -continue ASA  -continue atorvastatin  -continue Toprol  -Hemodynamically stable.   -Monitor: BP, HR, tele    Respiratory:   -Oxygenating well on room air  -Encourage continued use of IS 10x/hr and frequent ambulation  -CXR:    GI:  -GI PPX:  -PO Diet  -Bowel Regimen:     Renal / :  -Continue to monitor renal function: BUN/Cr  -Monitor I/O's daily     Endocrine:    -No hx of DM or thyroid dx  -A1c:  -TSH:    Hematologic:  -CBC: H/H-  -Coagulation Panel.    ID:  -Temperature:   -CBC: WBC-    Prophylaxis:  -DVT prophylaxis with 5000 SubQ Heparin q8h.  -Continue with SCD's b/l while patient is at rest     Disposition:  -OR Thursday 69M, former smoker (Quit 1997) with PMH of BPH, PE (2013), DVT (in 2024), STEPHEN, fatty liver, gout, OA, HTN, HLD, T2DM (on Mounjaro), Stage 3 CKD (baseline Cr 1.5), CAD/NSTEMI s/p stent to RCA (10/4/24 at [facility]), Afib s/p ablation (on Xarelto), LV thrombus s/p open heart thrombus extraction at [facility] (2020), pulmonary hypertension (on Opsumit), HFpEF and symptomatic severe aortic stenosis, who was deemed a good candidate for TAVR pending tooth extraction. On 1/4/24 he arrvied to [facility] for preop optimization prior to procedure. On 1/6/24 he underwent a tooth extraction with OMFS. Additional lasix IVP given. 1/7 heparin gtt restarted per OMFS. Brilinta on hold (plan to switch to eliquis/plavix postop). Soft and bite sized advanced to regular diet today. Plan for TAVR on Thursday, 1/9.    Plan:    Neurovascular:   -Pain well controlled with current regimen. PRN's: continue tylenol/oxy 5    HEENT  1/6 tooth extraction  -diet advanced per OMFS  -restarted heparin gtt    Cardiovascular:   preop TAVR 1/9  CAD s/p PCI  -plan for plavix/eliquis post procedure (no more brilinta)   -continue ASA  -continue atorvastatin  -continue Toprol  -Hemodynamically stable.   -Monitor: BP, HR, tele  DVT  -heparin gtt restarted    Respiratory:   -Oxygenating well on room air  -Encourage continued use of IS 10x/hr and frequent ambulation  -continue home montelukast  pulm HTN  -continue home opsumit    GI:  -GI PPX: continue protonix  -PO Diet  -Bowel Regimen: senna    Renal / :  CKD stage 3 (baseline Cr 1.5)  -Cr elevated; likely 2/2 lasix and NPO, will trend at 2pm  -Continue to monitor renal function: BUN/Cr: 39/1.94  -Monitor I/O's daily   BPH  -continue tamsulosin    Endocrine:    -No hx of DM or thyroid dx  -A1c: 5.3  -TSH: 3.320    Hematologic:  -CBC: H/H- 11.6/36.4  -Coagulation Panel.    ID:  -Temperature: afebrile  -CBC: WBC- 6.23    Prophylaxis:  -DVT prophylaxis with heparin gtt  -Continue with SCD's b/l while patient is at rest     Disposition:  -OR Thursday

## 2025-01-07 NOTE — PROGRESS NOTE ADULT - ASSESSMENT
69M with PMH of BPH, PE (2013), DVT (in 2024), STEPHEN, fatty liver, gout, OA, HTN, HLD, T2DM, CAD/NSTEMI s/p stent to RCA, Afib s/p ablation (on Xarelto), symptomatic severe aortic stenosis planned for TAVR on 1/9 now s/p dental extractions for pre-op dental clearance.    -Surgical site healing well, remaining dentition well appearing, pt optimized from dental perspective for planned TAVR procedure    RECS:  - OK to restart AC  - Advance to regular diet  - No abx necessary from OMFS perspective  - If site begins oozing once back on AC - recommend 30 min of firm digital pressure with TXA soaked gauze to surgical site  - Page OMFS w any further questions/concerns    Cliff Singh DMD  Oral & Maxillofacial Surgery  [phone]  Available on MS Teams

## 2025-01-08 LAB
ANION GAP SERPL CALC-SCNC: 9 MMOL/L — SIGNIFICANT CHANGE UP (ref 5–17)
APTT BLD: 54.9 SEC — HIGH (ref 24.5–35.6)
APTT BLD: 60.7 SEC — HIGH (ref 24.5–35.6)
APTT BLD: 73.3 SEC — HIGH (ref 24.5–35.6)
APTT BLD: 74.1 SEC — HIGH (ref 24.5–35.6)
BLD GP AB SCN SERPL QL: NEGATIVE — SIGNIFICANT CHANGE UP
BUN SERPL-MCNC: 36 MG/DL — HIGH (ref 7–23)
CALCIUM SERPL-MCNC: 9.9 MG/DL — SIGNIFICANT CHANGE UP (ref 8.4–10.5)
CHLORIDE SERPL-SCNC: 95 MMOL/L — LOW (ref 96–108)
CO2 SERPL-SCNC: 28 MMOL/L — SIGNIFICANT CHANGE UP (ref 22–31)
CREAT SERPL-MCNC: 1.63 MG/DL — HIGH (ref 0.5–1.3)
EGFR: 46 ML/MIN/1.73M2 — LOW
GLUCOSE SERPL-MCNC: 102 MG/DL — HIGH (ref 70–99)
HCT VFR BLD CALC: 40.1 % — SIGNIFICANT CHANGE UP (ref 39–50)
HGB BLD-MCNC: 12.8 G/DL — LOW (ref 13–17)
INR BLD: 1.03 — SIGNIFICANT CHANGE UP (ref 0.85–1.16)
MAGNESIUM SERPL-MCNC: 1.9 MG/DL — SIGNIFICANT CHANGE UP (ref 1.6–2.6)
MCHC RBC-ENTMCNC: 28.8 PG — SIGNIFICANT CHANGE UP (ref 27–34)
MCHC RBC-ENTMCNC: 31.9 G/DL — LOW (ref 32–36)
MCV RBC AUTO: 90.3 FL — SIGNIFICANT CHANGE UP (ref 80–100)
NRBC # BLD: 0 /100 WBCS — SIGNIFICANT CHANGE UP (ref 0–0)
PLATELET # BLD AUTO: 165 K/UL — SIGNIFICANT CHANGE UP (ref 150–400)
POTASSIUM SERPL-MCNC: 4.9 MMOL/L — SIGNIFICANT CHANGE UP (ref 3.5–5.3)
POTASSIUM SERPL-SCNC: 4.9 MMOL/L — SIGNIFICANT CHANGE UP (ref 3.5–5.3)
PROTHROM AB SERPL-ACNC: 12.1 SEC — SIGNIFICANT CHANGE UP (ref 9.9–13.4)
RBC # BLD: 4.44 M/UL — SIGNIFICANT CHANGE UP (ref 4.2–5.8)
RBC # FLD: 13.5 % — SIGNIFICANT CHANGE UP (ref 10.3–14.5)
RH IG SCN BLD-IMP: POSITIVE — SIGNIFICANT CHANGE UP
SODIUM SERPL-SCNC: 132 MMOL/L — LOW (ref 135–145)
WBC # BLD: 6.15 K/UL — SIGNIFICANT CHANGE UP (ref 3.8–10.5)
WBC # FLD AUTO: 6.15 K/UL — SIGNIFICANT CHANGE UP (ref 3.8–10.5)

## 2025-01-08 PROCEDURE — 71045 X-RAY EXAM CHEST 1 VIEW: CPT | Mod: 26

## 2025-01-08 RX ORDER — INSULIN LISPRO 100/ML
VIAL (ML) SUBCUTANEOUS
Refills: 0 | Status: DISCONTINUED | OUTPATIENT
Start: 2025-01-08 | End: 2025-01-12

## 2025-01-08 RX ORDER — DEXTROSE MONOHYDRATE 25 G/50ML
25 INJECTION, SOLUTION INTRAVENOUS ONCE
Refills: 0 | Status: DISCONTINUED | OUTPATIENT
Start: 2025-01-08 | End: 2025-01-11

## 2025-01-08 RX ORDER — DEXTROSE MONOHYDRATE 25 G/50ML
12.5 INJECTION, SOLUTION INTRAVENOUS ONCE
Refills: 0 | Status: DISCONTINUED | OUTPATIENT
Start: 2025-01-08 | End: 2025-01-11

## 2025-01-08 RX ORDER — DEXTROSE MONOHYDRATE 25 G/50ML
15 INJECTION, SOLUTION INTRAVENOUS ONCE
Refills: 0 | Status: DISCONTINUED | OUTPATIENT
Start: 2025-01-08 | End: 2025-01-12

## 2025-01-08 RX ORDER — CHLORHEXIDINE GLUCONATE 1.2 MG/ML
1 RINSE ORAL ONCE
Refills: 0 | Status: COMPLETED | OUTPATIENT
Start: 2025-01-08 | End: 2025-01-08

## 2025-01-08 RX ORDER — GINKGO BILOBA 40 MG
5 CAPSULE ORAL ONCE
Refills: 0 | Status: COMPLETED | OUTPATIENT
Start: 2025-01-08 | End: 2025-01-08

## 2025-01-08 RX ORDER — LACTULOSE 10 G/15ML
10 SOLUTION ORAL; RECTAL ONCE
Refills: 0 | Status: COMPLETED | OUTPATIENT
Start: 2025-01-08 | End: 2025-01-08

## 2025-01-08 RX ORDER — CHLORHEXIDINE GLUCONATE 1.2 MG/ML
15 RINSE ORAL ONCE
Refills: 0 | Status: COMPLETED | OUTPATIENT
Start: 2025-01-08 | End: 2025-01-09

## 2025-01-08 RX ORDER — CHLORHEXIDINE GLUCONATE 1.2 MG/ML
1 RINSE ORAL ONCE
Refills: 0 | Status: COMPLETED | OUTPATIENT
Start: 2025-01-08 | End: 2025-01-09

## 2025-01-08 RX ORDER — POLYETHYLENE GLYCOL 3350 17 G/DOSE
17 POWDER (GRAM) ORAL DAILY
Refills: 0 | Status: DISCONTINUED | OUTPATIENT
Start: 2025-01-08 | End: 2025-01-13

## 2025-01-08 RX ORDER — SODIUM CHLORIDE 9 MG/ML
1000 INJECTION, SOLUTION INTRAVENOUS
Refills: 0 | Status: DISCONTINUED | OUTPATIENT
Start: 2025-01-08 | End: 2025-01-11

## 2025-01-08 RX ORDER — CHLORHEXIDINE GLUCONATE 1.2 MG/ML
1 RINSE ORAL ONCE
Refills: 0 | Status: COMPLETED | OUTPATIENT
Start: 2025-01-09 | End: 2025-01-09

## 2025-01-08 RX ORDER — GLUCAGON INJECTION, SOLUTION 0.5 MG/.1ML
1 INJECTION, SOLUTION SUBCUTANEOUS ONCE
Refills: 0 | Status: DISCONTINUED | OUTPATIENT
Start: 2025-01-08 | End: 2025-01-11

## 2025-01-08 RX ORDER — CHLORHEXIDINE GLUCONATE 1.2 MG/ML
15 RINSE ORAL ONCE
Refills: 0 | Status: COMPLETED | OUTPATIENT
Start: 2025-01-08 | End: 2025-01-08

## 2025-01-08 RX ADMIN — LACTULOSE 10 GRAM(S): 10 SOLUTION ORAL; RECTAL at 16:14

## 2025-01-08 RX ADMIN — SENNOSIDES 2 TABLET(S): 8.6 TABLET, FILM COATED ORAL at 22:49

## 2025-01-08 RX ADMIN — Medication 17 GRAM(S): at 19:56

## 2025-01-08 RX ADMIN — SODIUM CHLORIDE 3 MILLILITER(S): 9 INJECTION, SOLUTION INTRAMUSCULAR; INTRAVENOUS; SUBCUTANEOUS at 14:00

## 2025-01-08 RX ADMIN — FENOFIBRATE 145 MILLIGRAM(S): 48 TABLET ORAL at 11:53

## 2025-01-08 RX ADMIN — Medication 5 MILLIGRAM(S): at 04:57

## 2025-01-08 RX ADMIN — Medication 800 MILLIGRAM(S): at 09:12

## 2025-01-08 RX ADMIN — ACETAMINOPHEN 650 MILLIGRAM(S): 80 SOLUTION/ DROPS ORAL at 03:16

## 2025-01-08 RX ADMIN — Medication 12.5 MILLIGRAM(S): at 05:22

## 2025-01-08 RX ADMIN — HEPARIN SODIUM 17 UNIT(S)/HR: 1000 INJECTION, SOLUTION INTRAVENOUS; SUBCUTANEOUS at 13:56

## 2025-01-08 RX ADMIN — Medication 81 MILLIGRAM(S): at 11:52

## 2025-01-08 RX ADMIN — TAMSULOSIN HYDROCHLORIDE 0.4 MILLIGRAM(S): 0.4 CAPSULE ORAL at 22:48

## 2025-01-08 RX ADMIN — MACITENTAN 10 MILLIGRAM(S): 10 TABLET, FILM COATED ORAL at 11:54

## 2025-01-08 RX ADMIN — SODIUM CHLORIDE 3 MILLILITER(S): 9 INJECTION, SOLUTION INTRAMUSCULAR; INTRAVENOUS; SUBCUTANEOUS at 21:05

## 2025-01-08 RX ADMIN — ATORVASTATIN CALCIUM 10 MILLIGRAM(S): 40 TABLET, FILM COATED ORAL at 22:48

## 2025-01-08 RX ADMIN — ACETAMINOPHEN 650 MILLIGRAM(S): 80 SOLUTION/ DROPS ORAL at 10:30

## 2025-01-08 RX ADMIN — MONTELUKAST SODIUM 10 MILLIGRAM(S): 10 TABLET, FILM COATED ORAL at 11:53

## 2025-01-08 RX ADMIN — ACETAMINOPHEN 650 MILLIGRAM(S): 80 SOLUTION/ DROPS ORAL at 12:05

## 2025-01-08 RX ADMIN — CHLORHEXIDINE GLUCONATE 1 APPLICATION(S): 1.2 RINSE ORAL at 19:27

## 2025-01-08 RX ADMIN — Medication 5 MILLIGRAM(S): at 05:22

## 2025-01-08 RX ADMIN — SODIUM CHLORIDE 3 MILLILITER(S): 9 INJECTION, SOLUTION INTRAMUSCULAR; INTRAVENOUS; SUBCUTANEOUS at 05:09

## 2025-01-08 RX ADMIN — PANTOPRAZOLE 40 MILLIGRAM(S): 40 TABLET, DELAYED RELEASE ORAL at 05:23

## 2025-01-08 RX ADMIN — Medication 12.5 MILLIGRAM(S): at 18:18

## 2025-01-08 NOTE — PROGRESS NOTE ADULT - ASSESSMENT
69 yoM, former smoker (Quit 1997) with PMH of BPH, PE (2013), DVT (in 2024), STEPHEN, fatty liver, gout, OA, HTN, HLD, T2DM (on Mounjaro), Stage 3 CKD (baseline Cr 1.5), CAD/NSTEMI s/p stent to RCA (10/4/24 at [facility]), Afib s/p ablation (on Xarelto), LV thrombus s/p open heart thrombus extraction at [facility] (2020), pulmonary hypertension (on Opsumit), HFpEF and symptomatic severe aortic stenosis, who was deemed a good candidate for TAVR pending tooth extraction. On 1/4/25 he arrived to [facility] for preop optimization prior to procedure. On 1/6/25 he underwent a tooth extraction with OMFS. Additional lasix IVP given. 1/7 heparin gtt restarted. Brilinta on hold (plan to switch to eliquis/plavix postop). Soft and bite sized advanced to regular diet today. Plan for TAVR on Thursday, 1/9.    Plan:    Neurovascular:   -Pain well controlled with current regimen. PRN's: continue tylenol/oxy 5    HEENT  1/6 tooth extraction  -diet advanced per OMFS  -cleared to restart heparin gtt    Cardiovascular:   preop TAVR 1/9 2/2 severe AS  CAD s/p PCI  -plan for plavix/eliquis post procedure (no more brilinta)   -continue ASA  -continue atorvastatin  -continue Toprol  -Hemodynamically stable.   -Monitor: BP, HR, tele  DVT/afib  -heparin gtt    Respiratory:   -Oxygenating well on room air  -Encourage continued use of IS 10x/hr and frequent ambulation  -continue home montelukast  pulm HTN  -continue home opsumit    GI:  -GI PPX: continue protonix  -PO Diet  -Bowel Regimen: senna    Renal / :  CKD stage 3 (baseline Cr 1.5)  -Cr elevated; trending down  -Continue to monitor renal function: BUN/Cr: 36/1.63  -Monitor I/O's daily   BPH  -continue tamsulosin    Endocrine:    -No hx of DM or thyroid dx  -A1c: 5.3  -TSH: 3.320    Hematologic:  -CBC: H/H- 12.8/40.1  -Coagulation Panel.    ID:  -Temperature: afebrile  -CBC: WBC- 6.15    Prophylaxis:  -DVT prophylaxis with heparin gtt  -Continue with SCD's b/l while patient is at rest     Disposition:  -OR Thursday

## 2025-01-08 NOTE — DIETITIAN INITIAL EVALUATION ADULT - PERSON TAUGHT/METHOD
RD encouraged continued PO intake as tolerated and discussed increased kcal/protein needs s/p OR. Pt verbalized appreciation and understanding./verbal instruction/patient instructed

## 2025-01-08 NOTE — DIETITIAN INITIAL EVALUATION ADULT - PERTINENT LABORATORY DATA
01-08    132[L]  |  95[L]  |  36[H]  ----------------------------<  102[H]  4.9   |  28  |  1.63[H]    Ca    9.9      08 Jan 2025 05:30  Phos  4.6     01-07  Mg     1.9     01-08    A1C with Estimated Average Glucose Result: 5.3 % (01-04-25 @ 13:55)

## 2025-01-08 NOTE — DIETITIAN INITIAL EVALUATION ADULT - OTHER CALCULATIONS
pounds, %%  Pt above % ideal body weight thus ideal body weight used for all calculations. Needs adjusted for age, nutrition optimization prior to OR. Fluids per team in view of CHF and hyponatremia.  No dosing height documented in EMR; Pt reported height 70in used to calculate needs.    pounds, %%  Pt above % ideal body weight thus ideal body weight used for all calculations. Needs adjusted for age, nutrition optimization prior to OR. Fluids per team in view of CHF and hyponatremia.

## 2025-01-08 NOTE — DIETITIAN INITIAL EVALUATION ADULT - ADD RECOMMEND
1. Continue DASH/TLC diet   2. Encourage and monitor PO intake, honor preferences as able   >> Consistently meet >75% of estimated needs during admission   >> Consider oral nutrition supplement or liberalizing diet should intake decline </= 50%   3. Monitor wt trends, GI function, skin integrity  4. Monitor lytes, renal indices, blood glucose, LFTs    5. Pain and bowel regimen per team

## 2025-01-08 NOTE — DIETITIAN INITIAL EVALUATION ADULT - PERTINENT MEDS FT
MEDICATIONS  (STANDING):  aspirin enteric coated 81 milliGRAM(s) Oral daily  atorvastatin 10 milliGRAM(s) Oral at bedtime  Breztri Aerosphere 160mcg/9mcg/4.8 mcg 160 MICROGram(s) 1 Puff(s) Inhalation two times a day  Breztri Aerosphere Inhaler 1 Puff(s) 1 Puff(s) Inhalation two times a day  chlorhexidine 0.12% Liquid 15 milliLiter(s) Swish and Spit once  chlorhexidine 4% Liquid 1 Application(s) Topical once  chlorhexidine 4% Liquid 1 Application(s) Topical once  fenofibrate Tablet 145 milliGRAM(s) Oral daily  heparin  Infusion 1500 Unit(s)/Hr (17 mL/Hr) IV Continuous <Continuous>  macitentan 10 milliGRAM(s) Oral daily  metoprolol succinate ER 12.5 milliGRAM(s) Oral every 12 hours  montelukast 10 milliGRAM(s) Oral daily  pantoprazole    Tablet 40 milliGRAM(s) Oral before breakfast  senna 2 Tablet(s) Oral at bedtime  sodium chloride 0.9% lock flush 3 milliLiter(s) IV Push every 8 hours  tamsulosin 0.4 milliGRAM(s) Oral at bedtime    MEDICATIONS  (PRN):  acetaminophen     Tablet .. 650 milliGRAM(s) Oral every 6 hours PRN Mild Pain (1 - 3)  oxyCODONE    IR 5 milliGRAM(s) Oral every 6 hours PRN Moderate Pain (4 - 6)  tranexamic acid Injectable for Topical Use 5 milliLiter(s) Topical once PRN tooth extraction bleeding

## 2025-01-08 NOTE — DIETITIAN INITIAL EVALUATION ADULT - NSICDXPASTMEDICALHX_GEN_ALL_CORE_FT
PAST MEDICAL HISTORY:  Aortic stenosis     Atrial fibrillation     BPH (benign prostatic hyperplasia)     CAD (coronary artery disease)     Chronic kidney disease (CKD)     Diabetes     DVT (deep venous thrombosis)     Heart failure     HTN (hypertension)     Left ventricular thrombus     STEPHEN (obstructive sleep apnea)     Pulmonary embolism     Pulmonary hypertension

## 2025-01-08 NOTE — DIETITIAN INITIAL EVALUATION ADULT - OTHER INFO
69M, former smoker (Quit 1997) with PMH of BPH, PE (2013), DVT (in 2024), STEPHEN, fatty liver, gout, OA, HTN, HLD, T2DM (on Mounjaro), Stage 3 CKD (baseline Cr 1.5), CAD/NSTEMI s/p stent to RCA (10/4/24 at [facility]), Afib s/p ablation (on Xarelto), LV thrombus s/p open heart thrombus extraction at [facility] (2020), pulmonary hypertension (on Opsumit), HFpEF and symptomatic severe aortic stenosis, deemed a good candidate for TAVR pending dental clearance. Arrived today 1/4/25 as planned admission for medical optimization (including tooth extraction on Monday 1/6/25) prior to TAVR this admission.     Pt seen for nutrition assessment. Received sitting upright in chair, able to articulate nutrition hx. Pt NPO pending possible OR today, previously ordered for DASH/TLC diet. Pt confirms no known food allergies and denies difficulty chewing or swallowing. Reports good appetite in-house and PTA with usual body weight of ~310 pounds. Pt endorses intentional 60 pound weight loss in last year after starting Mounjaro. Observed with no overt signs or symptoms of muscle or fat wasting. Based on ASPEN guidelines, pt does not meet criteria for malnutrition. Labs and medications reviewed. Na 132<L>, BUN/Cr 36/1.63<H>, eGFR 46<H>, glucose 100-112 x 24 hours, HgbA1c 5.3%<WNL> (1/4/25). Ordered for protonix, senna. Skin: no pressure injuries documented, noted with +3 BL leg edema. GI: noted with constipation, +BM 1/4 per flowsheets- team aware. See nutrition recommendations below.

## 2025-01-09 ENCOUNTER — RESULT REVIEW (OUTPATIENT)
Age: 70
End: 2025-01-09

## 2025-01-09 ENCOUNTER — APPOINTMENT (OUTPATIENT)
Dept: CARDIOTHORACIC SURGERY | Facility: HOSPITAL | Age: 70
End: 2025-01-09

## 2025-01-09 ENCOUNTER — TRANSCRIPTION ENCOUNTER (OUTPATIENT)
Age: 70
End: 2025-01-09

## 2025-01-09 LAB
ALBUMIN SERPL ELPH-MCNC: 4 G/DL — SIGNIFICANT CHANGE UP (ref 3.3–5)
ALBUMIN SERPL ELPH-MCNC: 4 G/DL — SIGNIFICANT CHANGE UP (ref 3.3–5)
ALP SERPL-CCNC: 37 U/L — LOW (ref 40–120)
ALP SERPL-CCNC: 37 U/L — LOW (ref 40–120)
ALT FLD-CCNC: 18 U/L — SIGNIFICANT CHANGE UP (ref 10–45)
ALT FLD-CCNC: 19 U/L — SIGNIFICANT CHANGE UP (ref 10–45)
ANION GAP SERPL CALC-SCNC: 10 MMOL/L — SIGNIFICANT CHANGE UP (ref 5–17)
ANION GAP SERPL CALC-SCNC: 11 MMOL/L — SIGNIFICANT CHANGE UP (ref 5–17)
ANION GAP SERPL CALC-SCNC: 12 MMOL/L — SIGNIFICANT CHANGE UP (ref 5–17)
APTT BLD: 124.8 SEC — CRITICAL HIGH (ref 24.5–35.6)
APTT BLD: 26.4 SEC — SIGNIFICANT CHANGE UP (ref 24.5–35.6)
APTT BLD: 27.4 SEC — SIGNIFICANT CHANGE UP (ref 24.5–35.6)
AST SERPL-CCNC: 34 U/L — SIGNIFICANT CHANGE UP (ref 10–40)
AST SERPL-CCNC: 35 U/L — SIGNIFICANT CHANGE UP (ref 10–40)
BASE EXCESS BLDA CALC-SCNC: 1.5 MMOL/L — SIGNIFICANT CHANGE UP (ref -2–3)
BASE EXCESS BLDA CALC-SCNC: 2.7 MMOL/L — SIGNIFICANT CHANGE UP (ref -2–3)
BASE EXCESS BLDA CALC-SCNC: 2.8 MMOL/L — SIGNIFICANT CHANGE UP (ref -2–3)
BASE EXCESS BLDA CALC-SCNC: 2.8 MMOL/L — SIGNIFICANT CHANGE UP (ref -2–3)
BASE EXCESS BLDA CALC-SCNC: 3.7 MMOL/L — HIGH (ref -2–3)
BASE EXCESS BLDA CALC-SCNC: 4 MMOL/L — HIGH (ref -2–3)
BASOPHILS # BLD AUTO: 0.01 K/UL — SIGNIFICANT CHANGE UP (ref 0–0.2)
BASOPHILS # BLD AUTO: 0.02 K/UL — SIGNIFICANT CHANGE UP (ref 0–0.2)
BASOPHILS NFR BLD AUTO: 0.1 % — SIGNIFICANT CHANGE UP (ref 0–2)
BASOPHILS NFR BLD AUTO: 0.2 % — SIGNIFICANT CHANGE UP (ref 0–2)
BILIRUB SERPL-MCNC: 0.3 MG/DL — SIGNIFICANT CHANGE UP (ref 0.2–1.2)
BILIRUB SERPL-MCNC: 0.3 MG/DL — SIGNIFICANT CHANGE UP (ref 0.2–1.2)
BUN SERPL-MCNC: 32 MG/DL — HIGH (ref 7–23)
BUN SERPL-MCNC: 32 MG/DL — HIGH (ref 7–23)
BUN SERPL-MCNC: 35 MG/DL — HIGH (ref 7–23)
CALCIUM SERPL-MCNC: 9 MG/DL — SIGNIFICANT CHANGE UP (ref 8.4–10.5)
CALCIUM SERPL-MCNC: 9.2 MG/DL — SIGNIFICANT CHANGE UP (ref 8.4–10.5)
CALCIUM SERPL-MCNC: 9.8 MG/DL — SIGNIFICANT CHANGE UP (ref 8.4–10.5)
CHLORIDE SERPL-SCNC: 94 MMOL/L — LOW (ref 96–108)
CHLORIDE SERPL-SCNC: 96 MMOL/L — SIGNIFICANT CHANGE UP (ref 96–108)
CHLORIDE SERPL-SCNC: 97 MMOL/L — SIGNIFICANT CHANGE UP (ref 96–108)
CO2 BLDA-SCNC: 29 MMOL/L — HIGH (ref 19–24)
CO2 BLDA-SCNC: 32 MMOL/L — HIGH (ref 19–24)
CO2 SERPL-SCNC: 28 MMOL/L — SIGNIFICANT CHANGE UP (ref 22–31)
CO2 SERPL-SCNC: 28 MMOL/L — SIGNIFICANT CHANGE UP (ref 22–31)
CO2 SERPL-SCNC: 30 MMOL/L — SIGNIFICANT CHANGE UP (ref 22–31)
CREAT SERPL-MCNC: 1.62 MG/DL — HIGH (ref 0.5–1.3)
CREAT SERPL-MCNC: 1.67 MG/DL — HIGH (ref 0.5–1.3)
CREAT SERPL-MCNC: 1.68 MG/DL — HIGH (ref 0.5–1.3)
EGFR: 44 ML/MIN/1.73M2 — LOW
EGFR: 44 ML/MIN/1.73M2 — LOW
EGFR: 46 ML/MIN/1.73M2 — LOW
EOSINOPHIL # BLD AUTO: 0.01 K/UL — SIGNIFICANT CHANGE UP (ref 0–0.5)
EOSINOPHIL # BLD AUTO: 0.01 K/UL — SIGNIFICANT CHANGE UP (ref 0–0.5)
EOSINOPHIL NFR BLD AUTO: 0.1 % — SIGNIFICANT CHANGE UP (ref 0–6)
EOSINOPHIL NFR BLD AUTO: 0.1 % — SIGNIFICANT CHANGE UP (ref 0–6)
GAS PNL BLDA: SIGNIFICANT CHANGE UP
GAS PNL BLDA: SIGNIFICANT CHANGE UP
GLUCOSE SERPL-MCNC: 115 MG/DL — HIGH (ref 70–99)
GLUCOSE SERPL-MCNC: 122 MG/DL — HIGH (ref 70–99)
GLUCOSE SERPL-MCNC: 142 MG/DL — HIGH (ref 70–99)
HCO3 BLDA-SCNC: 27 MMOL/L — SIGNIFICANT CHANGE UP (ref 21–28)
HCO3 BLDA-SCNC: 28 MMOL/L — SIGNIFICANT CHANGE UP (ref 21–28)
HCO3 BLDA-SCNC: 28 MMOL/L — SIGNIFICANT CHANGE UP (ref 21–28)
HCO3 BLDA-SCNC: 30 MMOL/L — HIGH (ref 21–28)
HCT VFR BLD CALC: 31.7 % — LOW (ref 39–50)
HCT VFR BLD CALC: 31.9 % — LOW (ref 39–50)
HCT VFR BLD CALC: 36.8 % — LOW (ref 39–50)
HGB BLD-MCNC: 10.4 G/DL — LOW (ref 13–17)
HGB BLD-MCNC: 10.5 G/DL — LOW (ref 13–17)
HGB BLD-MCNC: 11.6 G/DL — LOW (ref 13–17)
IMM GRANULOCYTES NFR BLD AUTO: 0.3 % — SIGNIFICANT CHANGE UP (ref 0–0.9)
IMM GRANULOCYTES NFR BLD AUTO: 0.3 % — SIGNIFICANT CHANGE UP (ref 0–0.9)
INR BLD: 1.08 — SIGNIFICANT CHANGE UP (ref 0.85–1.16)
INR BLD: 1.08 — SIGNIFICANT CHANGE UP (ref 0.85–1.16)
INR BLD: 1.12 — SIGNIFICANT CHANGE UP (ref 0.85–1.16)
LACTATE SERPL-SCNC: 0.7 MMOL/L — SIGNIFICANT CHANGE UP (ref 0.5–2)
LACTATE SERPL-SCNC: 1.5 MMOL/L — SIGNIFICANT CHANGE UP (ref 0.5–2)
LYMPHOCYTES # BLD AUTO: 0.63 K/UL — LOW (ref 1–3.3)
LYMPHOCYTES # BLD AUTO: 0.7 K/UL — LOW (ref 1–3.3)
LYMPHOCYTES # BLD AUTO: 6.4 % — LOW (ref 13–44)
LYMPHOCYTES # BLD AUTO: 8.2 % — LOW (ref 13–44)
MAGNESIUM SERPL-MCNC: 2.1 MG/DL — SIGNIFICANT CHANGE UP (ref 1.6–2.6)
MAGNESIUM SERPL-MCNC: 2.1 MG/DL — SIGNIFICANT CHANGE UP (ref 1.6–2.6)
MCHC RBC-ENTMCNC: 28.6 PG — SIGNIFICANT CHANGE UP (ref 27–34)
MCHC RBC-ENTMCNC: 29 PG — SIGNIFICANT CHANGE UP (ref 27–34)
MCHC RBC-ENTMCNC: 29.3 PG — SIGNIFICANT CHANGE UP (ref 27–34)
MCHC RBC-ENTMCNC: 31.5 G/DL — LOW (ref 32–36)
MCHC RBC-ENTMCNC: 32.6 G/DL — SIGNIFICANT CHANGE UP (ref 32–36)
MCHC RBC-ENTMCNC: 33.1 G/DL — SIGNIFICANT CHANGE UP (ref 32–36)
MCV RBC AUTO: 88.5 FL — SIGNIFICANT CHANGE UP (ref 80–100)
MCV RBC AUTO: 88.9 FL — SIGNIFICANT CHANGE UP (ref 80–100)
MCV RBC AUTO: 90.9 FL — SIGNIFICANT CHANGE UP (ref 80–100)
MONOCYTES # BLD AUTO: 0.26 K/UL — SIGNIFICANT CHANGE UP (ref 0–0.9)
MONOCYTES # BLD AUTO: 0.62 K/UL — SIGNIFICANT CHANGE UP (ref 0–0.9)
MONOCYTES NFR BLD AUTO: 3.4 % — SIGNIFICANT CHANGE UP (ref 2–14)
MONOCYTES NFR BLD AUTO: 5.7 % — SIGNIFICANT CHANGE UP (ref 2–14)
NEUTROPHILS # BLD AUTO: 6.77 K/UL — SIGNIFICANT CHANGE UP (ref 1.8–7.4)
NEUTROPHILS # BLD AUTO: 9.59 K/UL — HIGH (ref 1.8–7.4)
NEUTROPHILS NFR BLD AUTO: 87.3 % — HIGH (ref 43–77)
NEUTROPHILS NFR BLD AUTO: 87.9 % — HIGH (ref 43–77)
NRBC # BLD: 0 /100 WBCS — SIGNIFICANT CHANGE UP (ref 0–0)
PCO2 BLDA: 37 MMHG — SIGNIFICANT CHANGE UP (ref 35–48)
PCO2 BLDA: 44 MMHG — SIGNIFICANT CHANGE UP (ref 35–48)
PCO2 BLDA: 46 MMHG — SIGNIFICANT CHANGE UP (ref 35–48)
PCO2 BLDA: 54 MMHG — HIGH (ref 35–48)
PCO2 BLDA: 57 MMHG — HIGH (ref 35–48)
PCO2 BLDA: 59 MMHG — HIGH (ref 35–48)
PH BLDA: 7.32 — LOW (ref 7.35–7.45)
PH BLDA: 7.33 — LOW (ref 7.35–7.45)
PH BLDA: 7.36 — SIGNIFICANT CHANGE UP (ref 7.35–7.45)
PH BLDA: 7.38 — SIGNIFICANT CHANGE UP (ref 7.35–7.45)
PH BLDA: 7.41 — SIGNIFICANT CHANGE UP (ref 7.35–7.45)
PH BLDA: 7.48 — HIGH (ref 7.35–7.45)
PHOSPHATE SERPL-MCNC: 2.9 MG/DL — SIGNIFICANT CHANGE UP (ref 2.5–4.5)
PHOSPHATE SERPL-MCNC: 3.2 MG/DL — SIGNIFICANT CHANGE UP (ref 2.5–4.5)
PLATELET # BLD AUTO: 142 K/UL — LOW (ref 150–400)
PLATELET # BLD AUTO: 146 K/UL — LOW (ref 150–400)
PLATELET # BLD AUTO: 167 K/UL — SIGNIFICANT CHANGE UP (ref 150–400)
PO2 BLDA: 108 MMHG — SIGNIFICANT CHANGE UP (ref 83–108)
PO2 BLDA: 120 MMHG — HIGH (ref 83–108)
PO2 BLDA: 124 MMHG — HIGH (ref 83–108)
PO2 BLDA: 130 MMHG — HIGH (ref 83–108)
PO2 BLDA: 171 MMHG — HIGH (ref 83–108)
PO2 BLDA: 188 MMHG — HIGH (ref 83–108)
POTASSIUM SERPL-MCNC: 4.6 MMOL/L — SIGNIFICANT CHANGE UP (ref 3.5–5.3)
POTASSIUM SERPL-MCNC: 4.8 MMOL/L — SIGNIFICANT CHANGE UP (ref 3.5–5.3)
POTASSIUM SERPL-MCNC: 4.9 MMOL/L — SIGNIFICANT CHANGE UP (ref 3.5–5.3)
POTASSIUM SERPL-SCNC: 4.6 MMOL/L — SIGNIFICANT CHANGE UP (ref 3.5–5.3)
POTASSIUM SERPL-SCNC: 4.8 MMOL/L — SIGNIFICANT CHANGE UP (ref 3.5–5.3)
POTASSIUM SERPL-SCNC: 4.9 MMOL/L — SIGNIFICANT CHANGE UP (ref 3.5–5.3)
PROT SERPL-MCNC: 7 G/DL — SIGNIFICANT CHANGE UP (ref 6–8.3)
PROT SERPL-MCNC: 7.2 G/DL — SIGNIFICANT CHANGE UP (ref 6–8.3)
PROTHROM AB SERPL-ACNC: 12.6 SEC — SIGNIFICANT CHANGE UP (ref 9.9–13.4)
PROTHROM AB SERPL-ACNC: 12.6 SEC — SIGNIFICANT CHANGE UP (ref 9.9–13.4)
PROTHROM AB SERPL-ACNC: 12.9 SEC — SIGNIFICANT CHANGE UP (ref 9.9–13.4)
RBC # BLD: 3.58 M/UL — LOW (ref 4.2–5.8)
RBC # BLD: 3.59 M/UL — LOW (ref 4.2–5.8)
RBC # BLD: 4.05 M/UL — LOW (ref 4.2–5.8)
RBC # FLD: 13.5 % — SIGNIFICANT CHANGE UP (ref 10.3–14.5)
RBC # FLD: 13.7 % — SIGNIFICANT CHANGE UP (ref 10.3–14.5)
RBC # FLD: 13.7 % — SIGNIFICANT CHANGE UP (ref 10.3–14.5)
SAO2 % BLDA: 98.1 % — HIGH (ref 94–98)
SAO2 % BLDA: 98.1 % — HIGH (ref 94–98)
SAO2 % BLDA: 98.6 % — HIGH (ref 94–98)
SAO2 % BLDA: 98.6 % — HIGH (ref 94–98)
SAO2 % BLDA: 98.7 % — HIGH (ref 94–98)
SAO2 % BLDA: 98.9 % — HIGH (ref 94–98)
SODIUM SERPL-SCNC: 134 MMOL/L — LOW (ref 135–145)
SODIUM SERPL-SCNC: 135 MMOL/L — SIGNIFICANT CHANGE UP (ref 135–145)
SODIUM SERPL-SCNC: 137 MMOL/L — SIGNIFICANT CHANGE UP (ref 135–145)
WBC # BLD: 10.97 K/UL — HIGH (ref 3.8–10.5)
WBC # BLD: 5.54 K/UL — SIGNIFICANT CHANGE UP (ref 3.8–10.5)
WBC # BLD: 7.7 K/UL — SIGNIFICANT CHANGE UP (ref 3.8–10.5)
WBC # FLD AUTO: 10.97 K/UL — HIGH (ref 3.8–10.5)
WBC # FLD AUTO: 5.54 K/UL — SIGNIFICANT CHANGE UP (ref 3.8–10.5)
WBC # FLD AUTO: 7.7 K/UL — SIGNIFICANT CHANGE UP (ref 3.8–10.5)

## 2025-01-09 PROCEDURE — 33361 REPLACE AORTIC VALVE PERQ: CPT | Mod: 62,Q0

## 2025-01-09 PROCEDURE — 99292 CRITICAL CARE ADDL 30 MIN: CPT

## 2025-01-09 PROCEDURE — 33370 TCAT PLMT&RMVL CEPD PERQ: CPT

## 2025-01-09 PROCEDURE — 33370 TCAT PLMT&RMVL CEPD PERQ: CPT | Mod: 80

## 2025-01-09 PROCEDURE — 99291 CRITICAL CARE FIRST HOUR: CPT

## 2025-01-09 PROCEDURE — 93308 TTE F-UP OR LMTD: CPT | Mod: 26,59

## 2025-01-09 PROCEDURE — 71045 X-RAY EXAM CHEST 1 VIEW: CPT | Mod: 26

## 2025-01-09 PROCEDURE — 93010 ELECTROCARDIOGRAM REPORT: CPT

## 2025-01-09 PROCEDURE — 93306 TTE W/DOPPLER COMPLETE: CPT | Mod: 26

## 2025-01-09 DEVICE — VLV TRANS CATH W/COMM SYS SAPIEN 3 ULTRA 29MM: Type: IMPLANTABLE DEVICE | Status: FUNCTIONAL

## 2025-01-09 DEVICE — SUT PERCLOSE PROGLIDE 6FR: Type: IMPLANTABLE DEVICE | Status: FUNCTIONAL

## 2025-01-09 DEVICE — INTRODUCER SHEATH KIT GLIDESHEATH SLENDER FLEX STRAIGHT 21G 6F X 10CM: Type: IMPLANTABLE DEVICE | Status: FUNCTIONAL

## 2025-01-09 DEVICE — INTRO MICROPUNC 4FRX10CM SS: Type: IMPLANTABLE DEVICE | Status: FUNCTIONAL

## 2025-01-09 DEVICE — GWIRE JTIP 1.5MM .035X180CM: Type: IMPLANTABLE DEVICE | Status: FUNCTIONAL

## 2025-01-09 DEVICE — ANGIOSEAL VASC CLOS VIP 6FR: Type: IMPLANTABLE DEVICE | Status: FUNCTIONAL

## 2025-01-09 DEVICE — SHEATH INTRODUCER TERUMO PINNACLE CORONARY 5FR X 10CM X 0.038" MINI WIRE: Type: IMPLANTABLE DEVICE | Status: FUNCTIONAL

## 2025-01-09 DEVICE — GUIDEWIRE TERUMO GLIDEWIRE STIFF STRAGHT .035" X 180CM: Type: IMPLANTABLE DEVICE | Status: FUNCTIONAL

## 2025-01-09 DEVICE — KIT PACE ELCTR BIPOLAR 5FR: Type: IMPLANTABLE DEVICE | Status: FUNCTIONAL

## 2025-01-09 DEVICE — GLDWIRE ADVANTAGE .035X260 CM: Type: IMPLANTABLE DEVICE | Status: FUNCTIONAL

## 2025-01-09 DEVICE — IMPLANTABLE DEVICE: Type: IMPLANTABLE DEVICE | Status: FUNCTIONAL

## 2025-01-09 DEVICE — GUIDEWIRE AMPLATZ EXTRA-STIFF CURVED .035" X 260CM: Type: IMPLANTABLE DEVICE | Status: FUNCTIONAL

## 2025-01-09 DEVICE — KIT CVC 2LUM MAC 9FR CHG: Type: IMPLANTABLE DEVICE | Status: FUNCTIONAL

## 2025-01-09 DEVICE — GWIRE SUPRACORE .035X370: Type: IMPLANTABLE DEVICE | Status: FUNCTIONAL

## 2025-01-09 DEVICE — CATH DX PIG 145 INFIN 5FRX110CM: Type: IMPLANTABLE DEVICE | Status: FUNCTIONAL

## 2025-01-09 DEVICE — GUIDEWIRE STANDARD STRAIGHT .035" X 180CM: Type: IMPLANTABLE DEVICE | Status: FUNCTIONAL

## 2025-01-09 DEVICE — INTRO FLEXOR CHECK RAABE 5FR X 55CM: Type: IMPLANTABLE DEVICE | Status: FUNCTIONAL

## 2025-01-09 DEVICE — CATH OMNI FLSH 0.035IN 5FRX65: Type: IMPLANTABLE DEVICE | Status: FUNCTIONAL

## 2025-01-09 DEVICE — SHEATH INTRODUCER TERUMO PINNACLE CORONARY 6FR X 25CM: Type: IMPLANTABLE DEVICE | Status: FUNCTIONAL

## 2025-01-09 DEVICE — GUIDEWIRE LUNDERQUIST EXTRA-STIFF DOUBLE CURVED .035" X 260CM: Type: IMPLANTABLE DEVICE | Status: FUNCTIONAL

## 2025-01-09 DEVICE — GWIRE GUID  0.035INX150CM: Type: IMPLANTABLE DEVICE | Status: FUNCTIONAL

## 2025-01-09 DEVICE — SHEATH INTRODUCER TERUMO PINNACLE CORONARY 8FR X 10CM X 0.038" MINI WIRE: Type: IMPLANTABLE DEVICE | Status: FUNCTIONAL

## 2025-01-09 DEVICE — SHEATH INTRODUCER TERUMO PINNACLE CORONARY 6FR X 10CM X 0.038" MINI WIRE: Type: IMPLANTABLE DEVICE | Status: FUNCTIONAL

## 2025-01-09 DEVICE — EMERALD GUIDEWIRE 0.35: Type: IMPLANTABLE DEVICE | Status: FUNCTIONAL

## 2025-01-09 DEVICE — CATH DX AL1 INFIN 5FRX100CM: Type: IMPLANTABLE DEVICE | Status: FUNCTIONAL

## 2025-01-09 DEVICE — WIRE ASAHI GRAND SLAM 300CM: Type: IMPLANTABLE DEVICE | Status: FUNCTIONAL

## 2025-01-09 DEVICE — WIRE GD BMW UNIV II 300CM: Type: IMPLANTABLE DEVICE | Status: FUNCTIONAL

## 2025-01-09 DEVICE — WIRE GD CONFIDA BRECKER CRV .035X260: Type: IMPLANTABLE DEVICE | Status: FUNCTIONAL

## 2025-01-09 DEVICE — SYS CEREBRAL PROT NCT04149535 SENTINEL FOR STUDY ONLY: Type: IMPLANTABLE DEVICE | Status: FUNCTIONAL

## 2025-01-09 RX ORDER — CHLORHEXIDINE GLUCONATE 1.2 MG/ML
1 RINSE ORAL DAILY
Refills: 0 | Status: DISCONTINUED | OUTPATIENT
Start: 2025-01-09 | End: 2025-01-13

## 2025-01-09 RX ORDER — SODIUM PHOSPHATE, MONOBASIC, MONOHYDRATE AND SODIUM PHOSPHATE, DIBASIC ANHYDROUS 142; 276 MG/ML; MG/ML
30 INJECTION, SOLUTION INTRAVENOUS ONCE
Refills: 0 | Status: COMPLETED | OUTPATIENT
Start: 2025-01-09 | End: 2025-01-09

## 2025-01-09 RX ORDER — FUROSEMIDE 20 MG
20 TABLET ORAL ONCE
Refills: 0 | Status: COMPLETED | OUTPATIENT
Start: 2025-01-09 | End: 2025-01-09

## 2025-01-09 RX ORDER — PROPOFOL 10 MG/ML
20 INJECTION, EMULSION INTRAVENOUS
Qty: 1000 | Refills: 0 | Status: DISCONTINUED | OUTPATIENT
Start: 2025-01-09 | End: 2025-01-10

## 2025-01-09 RX ORDER — NOREPINEPHRINE BITARTRATE 1 MG/ML
0.05 INJECTION INTRAVENOUS
Qty: 8 | Refills: 0 | Status: DISCONTINUED | OUTPATIENT
Start: 2025-01-09 | End: 2025-01-10

## 2025-01-09 RX ORDER — CEFAZOLIN SODIUM 1 G
2000 VIAL (EA) INJECTION EVERY 8 HOURS
Refills: 0 | Status: COMPLETED | OUTPATIENT
Start: 2025-01-09 | End: 2025-01-09

## 2025-01-09 RX ORDER — ACETAMINOPHEN 80 MG/.8ML
650 SOLUTION/ DROPS ORAL EVERY 6 HOURS
Refills: 0 | Status: DISCONTINUED | OUTPATIENT
Start: 2025-01-09 | End: 2025-01-10

## 2025-01-09 RX ORDER — ASPIRIN 81 MG
81 TABLET, DELAYED RELEASE (ENTERIC COATED) ORAL DAILY
Refills: 0 | Status: DISCONTINUED | OUTPATIENT
Start: 2025-01-09 | End: 2025-01-09

## 2025-01-09 RX ORDER — ACETAMINOPHEN 80 MG/.8ML
1000 SOLUTION/ DROPS ORAL ONCE
Refills: 0 | Status: COMPLETED | OUTPATIENT
Start: 2025-01-09 | End: 2025-01-09

## 2025-01-09 RX ORDER — DIPHENHYDRAMINE HCL 25 MG
25 TABLET ORAL ONCE
Refills: 0 | Status: COMPLETED | OUTPATIENT
Start: 2025-01-09 | End: 2025-01-09

## 2025-01-09 RX ORDER — MAGNESIUM SULFATE 500 MG/ML
2 INJECTION, SOLUTION INTRAMUSCULAR; INTRAVENOUS ONCE
Refills: 0 | Status: COMPLETED | OUTPATIENT
Start: 2025-01-09 | End: 2025-01-09

## 2025-01-09 RX ORDER — CLOPIDOGREL BISULFATE 75 MG/1
75 TABLET, FILM COATED ORAL DAILY
Refills: 0 | Status: DISCONTINUED | OUTPATIENT
Start: 2025-01-10 | End: 2025-01-13

## 2025-01-09 RX ADMIN — Medication 25 MILLIGRAM(S): at 23:11

## 2025-01-09 RX ADMIN — Medication 12.5 MILLIGRAM(S): at 05:29

## 2025-01-09 RX ADMIN — Medication 12.5 MILLIGRAM(S): at 23:11

## 2025-01-09 RX ADMIN — CHLORHEXIDINE GLUCONATE 1 APPLICATION(S): 1.2 RINSE ORAL at 00:29

## 2025-01-09 RX ADMIN — CHLORHEXIDINE GLUCONATE 1 APPLICATION(S): 1.2 RINSE ORAL at 13:28

## 2025-01-09 RX ADMIN — Medication 20 MILLIGRAM(S): at 12:29

## 2025-01-09 RX ADMIN — ATORVASTATIN CALCIUM 10 MILLIGRAM(S): 40 TABLET, FILM COATED ORAL at 23:11

## 2025-01-09 RX ADMIN — PROPOFOL 16.8 MICROGRAM(S)/KG/MIN: 10 INJECTION, EMULSION INTRAVENOUS at 18:56

## 2025-01-09 RX ADMIN — MAGNESIUM SULFATE 25 GRAM(S): 500 INJECTION, SOLUTION INTRAMUSCULAR; INTRAVENOUS at 12:28

## 2025-01-09 RX ADMIN — Medication 100 MILLIGRAM(S): at 23:11

## 2025-01-09 RX ADMIN — ACETAMINOPHEN 400 MILLIGRAM(S): 80 SOLUTION/ DROPS ORAL at 18:53

## 2025-01-09 RX ADMIN — ACETAMINOPHEN 1000 MILLIGRAM(S): 80 SOLUTION/ DROPS ORAL at 19:56

## 2025-01-09 RX ADMIN — NOREPINEPHRINE BITARTRATE 13.1 MICROGRAM(S)/KG/MIN: 1 INJECTION INTRAVENOUS at 18:57

## 2025-01-09 RX ADMIN — SODIUM CHLORIDE 3 MILLILITER(S): 9 INJECTION, SOLUTION INTRAMUSCULAR; INTRAVENOUS; SUBCUTANEOUS at 22:29

## 2025-01-09 RX ADMIN — SODIUM CHLORIDE 3 MILLILITER(S): 9 INJECTION, SOLUTION INTRAMUSCULAR; INTRAVENOUS; SUBCUTANEOUS at 13:17

## 2025-01-09 RX ADMIN — CHLORHEXIDINE GLUCONATE 15 MILLILITER(S): 1.2 RINSE ORAL at 05:28

## 2025-01-09 RX ADMIN — TAMSULOSIN HYDROCHLORIDE 0.4 MILLIGRAM(S): 0.4 CAPSULE ORAL at 23:11

## 2025-01-09 RX ADMIN — CHLORHEXIDINE GLUCONATE 1 APPLICATION(S): 1.2 RINSE ORAL at 05:29

## 2025-01-09 RX ADMIN — SODIUM CHLORIDE 3 MILLILITER(S): 9 INJECTION, SOLUTION INTRAMUSCULAR; INTRAVENOUS; SUBCUTANEOUS at 05:30

## 2025-01-09 RX ADMIN — SENNOSIDES 2 TABLET(S): 8.6 TABLET, FILM COATED ORAL at 23:11

## 2025-01-09 RX ADMIN — Medication 81 MILLIGRAM(S): at 05:29

## 2025-01-09 RX ADMIN — Medication 100 MILLIGRAM(S): at 16:47

## 2025-01-09 RX ADMIN — Medication 2: at 16:48

## 2025-01-09 RX ADMIN — SODIUM PHOSPHATE, MONOBASIC, MONOHYDRATE AND SODIUM PHOSPHATE, DIBASIC ANHYDROUS 85 MILLIMOLE(S): 142; 276 INJECTION, SOLUTION INTRAVENOUS at 13:27

## 2025-01-09 RX ADMIN — PANTOPRAZOLE 40 MILLIGRAM(S): 40 TABLET, DELAYED RELEASE ORAL at 05:30

## 2025-01-09 NOTE — PRE-ANESTHESIA EVALUATION ADULT - LAST ECHOCARDIOGRAM
normal BiV fxn, severe AS (MARIA ALEJANDRA 0.69cm2)

## 2025-01-09 NOTE — BRIEF OPERATIVE NOTE - OPERATION/FINDINGS
Extraction of #4
RFA 14 Fr perc close x3, angio seal x1   LFA 6 Fr perc close x1  RRA TR band in place   RIJ TVP

## 2025-01-09 NOTE — PRE-ANESTHESIA EVALUATION ADULT - NSANTHADDINFOFT_GEN_ALL_CORE
Julia stopped 10 days.  Pt amendable to blood products if needed.  Pt understands possible P/O ventilation may be needed and wishes to proceed.

## 2025-01-09 NOTE — BRIEF OPERATIVE NOTE - NSICDXBRIEFPREOP_GEN_ALL_CORE_FT
PRE-OP DIAGNOSIS:  Aortic stenosis 09-Jan-2025 11:20:42  Chasidy Garcia  
PRE-OP DIAGNOSIS:  Cracked tooth 06-Jan-2025 15:35:52  Mo, Edward

## 2025-01-09 NOTE — BRIEF OPERATIVE NOTE - NSEVIDENCEINFORABS_GEN_ALL_CORE
No
Prep Survey      Responses   Facility patient discharged from?  [facility]   Is patient eligible?  Yes   Discharge diagnosis  Acute appendicitis with localized peritonitis   Does the patient have one of the following disease processes/diagnoses(primary or secondary)?  General Surgery   Does the patient have Home health ordered?  No   Is there a DME ordered?  No   Prep survey completed?  Yes          Selma Ludwig RN        
No

## 2025-01-09 NOTE — BRIEF OPERATIVE NOTE - NSICDXBRIEFPROCEDURE_GEN_ALL_CORE_FT
PROCEDURES:  Tooth extraction 06-Jan-2025 15:34:48  Mo, Erik  
PROCEDURES:  TAVR, percutaneous 09-Jan-2025 11:20:26  Chasidy Garcia  
Yes

## 2025-01-09 NOTE — BRIEF OPERATIVE NOTE - NSICDXBRIEFPOSTOP_GEN_ALL_CORE_FT
POST-OP DIAGNOSIS:  S/P TAVR (transcatheter aortic valve replacement) 09-Jan-2025 11:20:50  Chasidy Garcia

## 2025-01-09 NOTE — ANESTHESIA FOLLOW-UP NOTE - NSEVALATIONFT_GEN_ALL_CORE
Patient being admitted to the Peds Floor. Bedside report given to PALOMA Mckeon.  Patient awake and alert, rr unlabored,skin warm and dry, in stable condition, VSS.Patient in no distress or pain. Parents updated on POC, patient belongings being brought up to the floor with parents.     Intubated/sedated.  VSS.  On no pressors.  Complete signout given to CTICU team.

## 2025-01-10 LAB
ADD ON TEST-SPECIMEN IN LAB: SIGNIFICANT CHANGE UP
ALBUMIN SERPL ELPH-MCNC: 4 G/DL — SIGNIFICANT CHANGE UP (ref 3.3–5)
ALBUMIN SERPL ELPH-MCNC: 4.2 G/DL — SIGNIFICANT CHANGE UP (ref 3.3–5)
ALP SERPL-CCNC: 36 U/L — LOW (ref 40–120)
ALP SERPL-CCNC: 51 U/L — SIGNIFICANT CHANGE UP (ref 40–120)
ALT FLD-CCNC: 14 U/L — SIGNIFICANT CHANGE UP (ref 10–45)
ALT FLD-CCNC: 16 U/L — SIGNIFICANT CHANGE UP (ref 10–45)
ANION GAP SERPL CALC-SCNC: 12 MMOL/L — SIGNIFICANT CHANGE UP (ref 5–17)
ANION GAP SERPL CALC-SCNC: 13 MMOL/L — SIGNIFICANT CHANGE UP (ref 5–17)
APTT BLD: 26.2 SEC — SIGNIFICANT CHANGE UP (ref 24.5–35.6)
AST SERPL-CCNC: 35 U/L — SIGNIFICANT CHANGE UP (ref 10–40)
AST SERPL-CCNC: 36 U/L — SIGNIFICANT CHANGE UP (ref 10–40)
BASOPHILS # BLD AUTO: 0.01 K/UL — SIGNIFICANT CHANGE UP (ref 0–0.2)
BASOPHILS NFR BLD AUTO: 0.1 % — SIGNIFICANT CHANGE UP (ref 0–2)
BILIRUB SERPL-MCNC: 0.2 MG/DL — SIGNIFICANT CHANGE UP (ref 0.2–1.2)
BILIRUB SERPL-MCNC: 0.2 MG/DL — SIGNIFICANT CHANGE UP (ref 0.2–1.2)
BUN SERPL-MCNC: 28 MG/DL — HIGH (ref 7–23)
BUN SERPL-MCNC: 33 MG/DL — HIGH (ref 7–23)
CALCIUM SERPL-MCNC: 9.2 MG/DL — SIGNIFICANT CHANGE UP (ref 8.4–10.5)
CALCIUM SERPL-MCNC: 9.7 MG/DL — SIGNIFICANT CHANGE UP (ref 8.4–10.5)
CHLORIDE SERPL-SCNC: 97 MMOL/L — SIGNIFICANT CHANGE UP (ref 96–108)
CHLORIDE SERPL-SCNC: 97 MMOL/L — SIGNIFICANT CHANGE UP (ref 96–108)
CO2 SERPL-SCNC: 29 MMOL/L — SIGNIFICANT CHANGE UP (ref 22–31)
CO2 SERPL-SCNC: 30 MMOL/L — SIGNIFICANT CHANGE UP (ref 22–31)
CREAT SERPL-MCNC: 1.5 MG/DL — HIGH (ref 0.5–1.3)
CREAT SERPL-MCNC: 1.66 MG/DL — HIGH (ref 0.5–1.3)
EGFR: 45 ML/MIN/1.73M2 — LOW
EGFR: 50 ML/MIN/1.73M2 — LOW
EOSINOPHIL # BLD AUTO: 0.02 K/UL — SIGNIFICANT CHANGE UP (ref 0–0.5)
EOSINOPHIL NFR BLD AUTO: 0.2 % — SIGNIFICANT CHANGE UP (ref 0–6)
GAS PNL BLDA: SIGNIFICANT CHANGE UP
GLUCOSE SERPL-MCNC: 105 MG/DL — HIGH (ref 70–99)
GLUCOSE SERPL-MCNC: 146 MG/DL — HIGH (ref 70–99)
HCT VFR BLD CALC: 31 % — LOW (ref 39–50)
HGB BLD-MCNC: 10.3 G/DL — LOW (ref 13–17)
IMM GRANULOCYTES NFR BLD AUTO: 0.4 % — SIGNIFICANT CHANGE UP (ref 0–0.9)
INR BLD: 1.12 — SIGNIFICANT CHANGE UP (ref 0.85–1.16)
LYMPHOCYTES # BLD AUTO: 0.77 K/UL — LOW (ref 1–3.3)
LYMPHOCYTES # BLD AUTO: 8.1 % — LOW (ref 13–44)
MAGNESIUM SERPL-MCNC: 1.9 MG/DL — SIGNIFICANT CHANGE UP (ref 1.6–2.6)
MAGNESIUM SERPL-MCNC: 2 MG/DL — SIGNIFICANT CHANGE UP (ref 1.6–2.6)
MCHC RBC-ENTMCNC: 29.7 PG — SIGNIFICANT CHANGE UP (ref 27–34)
MCHC RBC-ENTMCNC: 33.2 G/DL — SIGNIFICANT CHANGE UP (ref 32–36)
MCV RBC AUTO: 89.3 FL — SIGNIFICANT CHANGE UP (ref 80–100)
MONOCYTES # BLD AUTO: 0.44 K/UL — SIGNIFICANT CHANGE UP (ref 0–0.9)
MONOCYTES NFR BLD AUTO: 4.6 % — SIGNIFICANT CHANGE UP (ref 2–14)
NEUTROPHILS # BLD AUTO: 8.19 K/UL — HIGH (ref 1.8–7.4)
NEUTROPHILS NFR BLD AUTO: 86.6 % — HIGH (ref 43–77)
NRBC # BLD: 0 /100 WBCS — SIGNIFICANT CHANGE UP (ref 0–0)
PHOSPHATE SERPL-MCNC: 3.8 MG/DL — SIGNIFICANT CHANGE UP (ref 2.5–4.5)
PLATELET # BLD AUTO: 132 K/UL — LOW (ref 150–400)
POTASSIUM SERPL-MCNC: 4.1 MMOL/L — SIGNIFICANT CHANGE UP (ref 3.5–5.3)
POTASSIUM SERPL-MCNC: 4.4 MMOL/L — SIGNIFICANT CHANGE UP (ref 3.5–5.3)
POTASSIUM SERPL-SCNC: 4.1 MMOL/L — SIGNIFICANT CHANGE UP (ref 3.5–5.3)
POTASSIUM SERPL-SCNC: 4.4 MMOL/L — SIGNIFICANT CHANGE UP (ref 3.5–5.3)
PROT SERPL-MCNC: 6.8 G/DL — SIGNIFICANT CHANGE UP (ref 6–8.3)
PROT SERPL-MCNC: 7.5 G/DL — SIGNIFICANT CHANGE UP (ref 6–8.3)
PROTHROM AB SERPL-ACNC: 13.1 SEC — SIGNIFICANT CHANGE UP (ref 9.9–13.4)
RBC # BLD: 3.47 M/UL — LOW (ref 4.2–5.8)
RBC # FLD: 14 % — SIGNIFICANT CHANGE UP (ref 10.3–14.5)
SODIUM SERPL-SCNC: 138 MMOL/L — SIGNIFICANT CHANGE UP (ref 135–145)
SODIUM SERPL-SCNC: 140 MMOL/L — SIGNIFICANT CHANGE UP (ref 135–145)
WBC # BLD: 9.47 K/UL — SIGNIFICANT CHANGE UP (ref 3.8–10.5)
WBC # FLD AUTO: 9.47 K/UL — SIGNIFICANT CHANGE UP (ref 3.8–10.5)

## 2025-01-10 PROCEDURE — 99232 SBSQ HOSP IP/OBS MODERATE 35: CPT

## 2025-01-10 PROCEDURE — 93010 ELECTROCARDIOGRAM REPORT: CPT

## 2025-01-10 PROCEDURE — 99222 1ST HOSP IP/OBS MODERATE 55: CPT

## 2025-01-10 PROCEDURE — 71045 X-RAY EXAM CHEST 1 VIEW: CPT | Mod: 26

## 2025-01-10 RX ORDER — MAGNESIUM SULFATE 500 MG/ML
2 INJECTION, SOLUTION INTRAMUSCULAR; INTRAVENOUS ONCE
Refills: 0 | Status: COMPLETED | OUTPATIENT
Start: 2025-01-10 | End: 2025-01-10

## 2025-01-10 RX ORDER — FUROSEMIDE 20 MG
40 TABLET ORAL ONCE
Refills: 0 | Status: COMPLETED | OUTPATIENT
Start: 2025-01-10 | End: 2025-01-10

## 2025-01-10 RX ORDER — BENZOCAINE AND MENTHOL 15; 3.6 MG/1; MG/1
1 LOZENGE ORAL EVERY 4 HOURS
Refills: 0 | Status: DISCONTINUED | OUTPATIENT
Start: 2025-01-10 | End: 2025-01-13

## 2025-01-10 RX ORDER — NYSTATIN TOPICAL POWDER 100000 U/G
1 POWDER TOPICAL
Refills: 0 | Status: DISCONTINUED | OUTPATIENT
Start: 2025-01-10 | End: 2025-01-13

## 2025-01-10 RX ORDER — METOPROLOL TARTRATE 50 MG
12.5 TABLET ORAL ONCE
Refills: 0 | Status: COMPLETED | OUTPATIENT
Start: 2025-01-10 | End: 2025-01-10

## 2025-01-10 RX ORDER — METOPROLOL TARTRATE 50 MG
12.5 TABLET ORAL
Refills: 0 | Status: DISCONTINUED | OUTPATIENT
Start: 2025-01-10 | End: 2025-01-11

## 2025-01-10 RX ORDER — HEPARIN SODIUM 1000 [USP'U]/ML
7500 INJECTION, SOLUTION INTRAVENOUS; SUBCUTANEOUS EVERY 8 HOURS
Refills: 0 | Status: DISCONTINUED | OUTPATIENT
Start: 2025-01-10 | End: 2025-01-11

## 2025-01-10 RX ORDER — MAGNESIUM HYDROXIDE 400 MG/5ML
30 SUSPENSION, ORAL (FINAL DOSE FORM) ORAL DAILY
Refills: 0 | Status: DISCONTINUED | OUTPATIENT
Start: 2025-01-10 | End: 2025-01-13

## 2025-01-10 RX ORDER — POTASSIUM CHLORIDE 600 MG/1
20 TABLET, FILM COATED, EXTENDED RELEASE ORAL ONCE
Refills: 0 | Status: COMPLETED | OUTPATIENT
Start: 2025-01-10 | End: 2025-01-10

## 2025-01-10 RX ADMIN — POTASSIUM CHLORIDE 20 MILLIEQUIVALENT(S): 600 TABLET, FILM COATED, EXTENDED RELEASE ORAL at 17:46

## 2025-01-10 RX ADMIN — ACETAMINOPHEN 650 MILLIGRAM(S): 80 SOLUTION/ DROPS ORAL at 15:27

## 2025-01-10 RX ADMIN — MONTELUKAST SODIUM 10 MILLIGRAM(S): 10 TABLET, FILM COATED ORAL at 11:05

## 2025-01-10 RX ADMIN — MACITENTAN 10 MILLIGRAM(S): 10 TABLET, FILM COATED ORAL at 11:05

## 2025-01-10 RX ADMIN — Medication 12.5 MILLIGRAM(S): at 15:27

## 2025-01-10 RX ADMIN — Medication 12.5 MILLIGRAM(S): at 12:59

## 2025-01-10 RX ADMIN — NYSTATIN TOPICAL POWDER 1 APPLICATION(S): 100000 POWDER TOPICAL at 19:15

## 2025-01-10 RX ADMIN — SODIUM CHLORIDE 3 MILLILITER(S): 9 INJECTION, SOLUTION INTRAMUSCULAR; INTRAVENOUS; SUBCUTANEOUS at 14:05

## 2025-01-10 RX ADMIN — Medication 40 MILLIGRAM(S): at 10:25

## 2025-01-10 RX ADMIN — CLOPIDOGREL BISULFATE 75 MILLIGRAM(S): 75 TABLET, FILM COATED ORAL at 11:06

## 2025-01-10 RX ADMIN — Medication 12.5 MILLIGRAM(S): at 21:43

## 2025-01-10 RX ADMIN — BENZOCAINE AND MENTHOL 1 LOZENGE: 15; 3.6 LOZENGE ORAL at 06:42

## 2025-01-10 RX ADMIN — SODIUM CHLORIDE 3 MILLILITER(S): 9 INJECTION, SOLUTION INTRAMUSCULAR; INTRAVENOUS; SUBCUTANEOUS at 06:16

## 2025-01-10 RX ADMIN — ATORVASTATIN CALCIUM 10 MILLIGRAM(S): 40 TABLET, FILM COATED ORAL at 21:37

## 2025-01-10 RX ADMIN — SENNOSIDES 2 TABLET(S): 8.6 TABLET, FILM COATED ORAL at 21:37

## 2025-01-10 RX ADMIN — MAGNESIUM SULFATE 25 GRAM(S): 500 INJECTION, SOLUTION INTRAMUSCULAR; INTRAVENOUS at 17:46

## 2025-01-10 RX ADMIN — FENOFIBRATE 145 MILLIGRAM(S): 48 TABLET ORAL at 11:05

## 2025-01-10 RX ADMIN — TAMSULOSIN HYDROCHLORIDE 0.4 MILLIGRAM(S): 0.4 CAPSULE ORAL at 21:36

## 2025-01-10 RX ADMIN — ACETAMINOPHEN 650 MILLIGRAM(S): 80 SOLUTION/ DROPS ORAL at 15:29

## 2025-01-10 RX ADMIN — PANTOPRAZOLE 40 MILLIGRAM(S): 40 TABLET, DELAYED RELEASE ORAL at 06:42

## 2025-01-10 RX ADMIN — SODIUM CHLORIDE 3 MILLILITER(S): 9 INJECTION, SOLUTION INTRAMUSCULAR; INTRAVENOUS; SUBCUTANEOUS at 21:45

## 2025-01-10 NOTE — CONSULT NOTE ADULT - NS ATTEND AMEND GEN_ALL_CORE FT
69 M, former smoke with PMH of BPH, PE (2013), DVT (in 2024), STEPHEN, fatty liver, gout, OA, HTN, HLD, T2DM (on Mounjaro), Stage 3 CKD (baseline Cr 1.5), CAD/NSTEMI s/p stent to RCA (10/4/24 at [facility]), Afib?, PVC s/p ablation a few months ago at [facility] (on Xarelto), LV thrombus s/p open heart thrombus extraction at [facility] (2020), pulmonary hypertension (on Opsumit), HFpEF and symptomatic severe aortic stenosis, who had TAVR with Iraida valve on 1/9/25 and noted to have new LBBB qrs 150ms post procedure. Pre TAVR EKG showed NSR with frequent PVC, postop Day 1 EKG continued to show unchanged LBBB at 148ms. Plan to keep TVP today, reassess tmr for any worsening of LBBB for the next 1-2 days. Will restart lopressor 25mg bid for very frequent PVC while still has TVP in. If the LBB does not change overnight weekend, likely can go home on EM. He will plan to follow up with his EP locally for PVC management.

## 2025-01-10 NOTE — CONSULT NOTE ADULT - SUBJECTIVE AND OBJECTIVE BOX
Electrophysiology Consult Note:     CHIEF COMPLAINT:  Patient is a 69y old  Male who presents with a chief complaint of medical optimization prior to TAVR (09 Jan 2025 23:24)        HISTORY OF PRESENT ILLNESS:   HPI:  69 M, former smoker (Quit 1997) with PMH of BPH, PE (2013), DVT (in 2024), STEPHEN, fatty liver, gout, OA, HTN, HLD, T2DM (on Mounjaro), Stage 3 CKD (baseline Cr 1.5), CAD/NSTEMI s/p stent to RCA (10/4/24 at [facility]), Afib s/p ablation (on Xarelto), LV thrombus s/p open heart thrombus extraction at [facility] (2020), pulmonary hypertension (on Opsumit),  PCV ablation , HFpEF and symptomatic severe aortic stenosis, who had  TAVR with Iraida valve on 1/9/25 and noted to have  new LBBB post procedure.   EPS called to evaluate for  possible pacemaker implant.       PAST MEDICAL & SURGICAL HISTORY:  BPH (benign prostatic hyperplasia)      Pulmonary embolism      DVT (deep venous thrombosis)      STEPHEN (obstructive sleep apnea)      HTN (hypertension)      Diabetes      Chronic kidney disease (CKD)      CAD (coronary artery disease)      Atrial fibrillation      Left ventricular thrombus      Pulmonary hypertension      Heart failure      Aortic stenosis      History of percutaneous coronary intervention      H/O prior ablation treatment      Left ventricular thrombus without MI          FAMILY HISTORY:  No pertinent family history in first degree relatives        SOCIAL HISTORY:    [x ] Non-smoking     Allergies    No Known Allergies    Intolerances    	    MEDICATIONS:  MEDICATIONS  (STANDING):  atorvastatin 10 milliGRAM(s) Oral at bedtime  Breztri Aerosphere 160mcg/9mcg/4.8 mcg 160 MICROGram(s) 1 Puff(s) Inhalation two times a day  Breztri Aerosphere Inhaler 1 Puff(s) 1 Puff(s) Inhalation two times a day  chlorhexidine 2% Cloths 1 Application(s) Topical daily  clopidogrel Tablet 75 milliGRAM(s) Oral daily  dextrose 5%. 1000 milliLiter(s) (100 mL/Hr) IV Continuous <Continuous>  dextrose 5%. 1000 milliLiter(s) (50 mL/Hr) IV Continuous <Continuous>  dextrose 50% Injectable 25 Gram(s) IV Push once  dextrose 50% Injectable 12.5 Gram(s) IV Push once  dextrose 50% Injectable 25 Gram(s) IV Push once  fenofibrate Tablet 145 milliGRAM(s) Oral daily  glucagon  Injectable 1 milliGRAM(s) IntraMuscular once  insulin lispro (ADMELOG) corrective regimen sliding scale   SubCutaneous Before meals and at bedtime  macitentan 10 milliGRAM(s) Oral daily  montelukast 10 milliGRAM(s) Oral daily  norepinephrine Infusion 0.05 MICROgram(s)/kG/Min (13.1 mL/Hr) IV Continuous <Continuous>  pantoprazole    Tablet 40 milliGRAM(s) Oral before breakfast  senna 2 Tablet(s) Oral at bedtime  sodium chloride 0.9% lock flush 3 milliLiter(s) IV Push every 8 hours  tamsulosin 0.4 milliGRAM(s) Oral at bedtime    MEDICATIONS  (PRN):  acetaminophen     Tablet .. 650 milliGRAM(s) Oral every 6 hours PRN Mild Pain (1 - 3)  acetaminophen     Tablet .. 650 milliGRAM(s) Oral every 6 hours PRN Mild Pain (1 - 3)  benzocaine/menthol Lozenge 1 Lozenge Oral every 4 hours PRN Sore Throat  dextrose Oral Gel 15 Gram(s) Oral once PRN Blood Glucose LESS THAN 70 milliGRAM(s)/deciliter  magnesium hydroxide Suspension 30 milliLiter(s) Oral daily PRN Constipation  oxyCODONE    IR 5 milliGRAM(s) Oral every 6 hours PRN Moderate Pain (4 - 6)  polyethylene glycol 3350 17 Gram(s) Oral daily PRN Constipation        REVIEW OF SYSTEMS:  CONSTITUTIONAL: No fever, weight loss, or fatigue  EYES: No eye pain, visual disturbances, or discharge  ENMT:  No difficulty hearing, tinnitus, vertigo; No sinus or throat pain  NECK: No pain or stiffness  BREASTS: No pain, masses, or nipple discharge  RESPIRATORY: No cough, wheezing, chills or hemoptysis; No Shortness of Breath  CARDIOVASCULAR: No chest pain, palpitations, dizziness, or leg swelling  GASTROINTESTINAL: No abdominal or epigastric pain. No nausea, vomiting, or hematemesis;  GENITOURINARY: No dysuria, frequency, hematuria, or incontinence      PHYSICAL EXAM:  Vital Signs Last 24 Hrs  T(C): 36.3 (10 Sulaiman 2025 08:33), Max: 36.9 (10 Sulaiman 2025 06:01)  T(F): 97.3 (10 Sulaiman 2025 08:33), Max: 98.4 (10 Sulaiman 2025 06:01)  HR: 77 (10 Sulaiman 2025 11:00) (55 - 96)  BP: 132/65 (09 Jan 2025 22:00) (132/65 - 132/65)  BP(mean): 93 (09 Jan 2025 22:00) (93 - 93)  RR: 18 (10 Sulaiman 2025 11:00) (10 - 18)  SpO2: 96% (10 Sulaiman 2025 11:00) (94% - 100%)    Parameters below as of 10 Sulaiman 2025 11:00  Patient On (Oxygen Delivery Method): nasal cannula w/ humidification  O2 Flow (L/min): 3    Daily     Daily     Constitutional: NAD	  HEENT:  PERRL, EOMI	  CVS:  S1 S2, No JVD, No edema  Pulm: Lungs clear to auscultation	  GI:  Soft, Non-tender, + BS	  Ext: No LE edema  Neurologic: A&O x 3         	  LABS:	                         10.3   9.47  )-----------( 132      ( 10 Sulaiman 2025 04:20 )             31.0     01-10    138  |  97  |  28[H]  ----------------------------<  146[H]  4.4   |  29  |  1.50[H]    Ca    9.2      10 Sulaiman 2025 04:20  Phos  3.8     01-10  Mg     2.0     01-10    TPro  6.8  /  Alb  4.0  /  TBili  0.2  /  DBili  x   /  AST  36  /  ALT  16  /  AlkPhos  36[L]  01-10    proBNP:   Lipid Profile:   HgA1c:   TSH: 	      EKG: < from: 12 Lead ECG (01.06.25 @ 12:21) >  Ventricular Rate 73 BPM    Atrial Rate 73 BPM    P-R Interval 170 ms    QRS Duration 80 ms    Q-T Interval 380 ms    QTC Calculation(Bazett) 418 ms    P Axis 9 degrees    R Axis 73 degrees    T Axis 51 degrees    Diagnosis Line Sinus rhythm with frequent premature ventricular complexes  Otherwise normal ECG      Telemetry: sinus rhythm LBBB, PVCs and APCs     Echo: < from: TTE Echo Complete w/ Contrast w/ Doppler (01.09.25 @ 12:31) >  1. Normal left ventricular size.   2. Mild symmetric left ventricular hypertrophy.   3. Normal left ventricular systolic function.   4. Normal right ventricular size and systolic function.   5. Dilated left atrium.   6. TAVR valve is seen in the aortic position with apparent normal   function, without evidenceof prosthetic dysfunction.   7. No evidence of pulmonary hypertension.   8. No pericardial effusion.   9. Compared to the previous TTE performed on 12/2/2024, pt is s/p TAVR.

## 2025-01-10 NOTE — CONSULT NOTE ADULT - NS_MD_PANP_GEN_ALL_CORE
Hpi Title: Evaluation of Skin Lesions
How Severe Are Your Spot(S)?: mild
Have Your Spot(S) Been Treated In The Past?: has not been treated
Attending and PA/NP shared services statement (NON-critical care):

## 2025-01-10 NOTE — CONSULT NOTE ADULT - ASSESSMENT
69 M, former smoker (Quit 1997) with PMH of BPH, PE (2013), DVT (in 2024), STEPHEN, fatty liver, gout, OA, HTN, HLD, T2DM (on Mounjaro), Stage 3 CKD (baseline Cr 1.5), CAD/NSTEMI s/p stent to RCA (10/4/24 at [facility]), Afib s/p ablation (on Xarelto), LV thrombus s/p open heart thrombus extraction at [facility] (2020), pulmonary hypertension (on Opsumit),  PCV ablation , HFpEF and symptomatic severe aortic stenosis, who had  TAVR with Iraida valve on 1/9/25 and noted to have  new LBBB post procedure.   Patients post TAVR ecg with  ms and  ms , base line ecg with QRS 82 ms , PVCs and  ms.  Will  continue to  monitor ECGs for QRS progression , keep TVP , ok to restart BB Metoprolol 12.5 mg daily and increase to home dose as tolerated.

## 2025-01-11 LAB
ALBUMIN SERPL ELPH-MCNC: 4.1 G/DL — SIGNIFICANT CHANGE UP (ref 3.3–5)
ALBUMIN SERPL ELPH-MCNC: 4.3 G/DL — SIGNIFICANT CHANGE UP (ref 3.3–5)
ALP SERPL-CCNC: 42 U/L — SIGNIFICANT CHANGE UP (ref 40–120)
ALP SERPL-CCNC: 50 U/L — SIGNIFICANT CHANGE UP (ref 40–120)
ALT FLD-CCNC: 11 U/L — SIGNIFICANT CHANGE UP (ref 10–45)
ALT FLD-CCNC: 12 U/L — SIGNIFICANT CHANGE UP (ref 10–45)
ANION GAP SERPL CALC-SCNC: 11 MMOL/L — SIGNIFICANT CHANGE UP (ref 5–17)
ANION GAP SERPL CALC-SCNC: 9 MMOL/L — SIGNIFICANT CHANGE UP (ref 5–17)
APTT BLD: 20.2 SEC — LOW (ref 24.5–35.6)
APTT BLD: 25.6 SEC — SIGNIFICANT CHANGE UP (ref 24.5–35.6)
AST SERPL-CCNC: 30 U/L — SIGNIFICANT CHANGE UP (ref 10–40)
AST SERPL-CCNC: 31 U/L — SIGNIFICANT CHANGE UP (ref 10–40)
BASOPHILS # BLD AUTO: 0.03 K/UL — SIGNIFICANT CHANGE UP (ref 0–0.2)
BASOPHILS # BLD AUTO: 0.05 K/UL — SIGNIFICANT CHANGE UP (ref 0–0.2)
BASOPHILS NFR BLD AUTO: 0.4 % — SIGNIFICANT CHANGE UP (ref 0–2)
BASOPHILS NFR BLD AUTO: 0.6 % — SIGNIFICANT CHANGE UP (ref 0–2)
BILIRUB SERPL-MCNC: 0.4 MG/DL — SIGNIFICANT CHANGE UP (ref 0.2–1.2)
BILIRUB SERPL-MCNC: 0.4 MG/DL — SIGNIFICANT CHANGE UP (ref 0.2–1.2)
BUN SERPL-MCNC: 31 MG/DL — HIGH (ref 7–23)
BUN SERPL-MCNC: 34 MG/DL — HIGH (ref 7–23)
CALCIUM SERPL-MCNC: 9.2 MG/DL — SIGNIFICANT CHANGE UP (ref 8.4–10.5)
CALCIUM SERPL-MCNC: 9.6 MG/DL — SIGNIFICANT CHANGE UP (ref 8.4–10.5)
CHLORIDE SERPL-SCNC: 98 MMOL/L — SIGNIFICANT CHANGE UP (ref 96–108)
CHLORIDE SERPL-SCNC: 98 MMOL/L — SIGNIFICANT CHANGE UP (ref 96–108)
CO2 SERPL-SCNC: 32 MMOL/L — HIGH (ref 22–31)
CO2 SERPL-SCNC: 32 MMOL/L — HIGH (ref 22–31)
CREAT SERPL-MCNC: 1.54 MG/DL — HIGH (ref 0.5–1.3)
CREAT SERPL-MCNC: 1.56 MG/DL — HIGH (ref 0.5–1.3)
EGFR: 48 ML/MIN/1.73M2 — LOW
EGFR: 49 ML/MIN/1.73M2 — LOW
EOSINOPHIL # BLD AUTO: 0.32 K/UL — SIGNIFICANT CHANGE UP (ref 0–0.5)
EOSINOPHIL # BLD AUTO: 0.35 K/UL — SIGNIFICANT CHANGE UP (ref 0–0.5)
EOSINOPHIL NFR BLD AUTO: 4 % — SIGNIFICANT CHANGE UP (ref 0–6)
EOSINOPHIL NFR BLD AUTO: 4.5 % — SIGNIFICANT CHANGE UP (ref 0–6)
GLUCOSE SERPL-MCNC: 109 MG/DL — HIGH (ref 70–99)
GLUCOSE SERPL-MCNC: 93 MG/DL — SIGNIFICANT CHANGE UP (ref 70–99)
HCT VFR BLD CALC: 31.3 % — LOW (ref 39–50)
HCT VFR BLD CALC: 33.7 % — LOW (ref 39–50)
HGB BLD-MCNC: 10.1 G/DL — LOW (ref 13–17)
HGB BLD-MCNC: 10.8 G/DL — LOW (ref 13–17)
IMM GRANULOCYTES NFR BLD AUTO: 0.3 % — SIGNIFICANT CHANGE UP (ref 0–0.9)
IMM GRANULOCYTES NFR BLD AUTO: 0.4 % — SIGNIFICANT CHANGE UP (ref 0–0.9)
INR BLD: 1.08 — SIGNIFICANT CHANGE UP (ref 0.85–1.16)
INR BLD: 1.15 — SIGNIFICANT CHANGE UP (ref 0.85–1.16)
LYMPHOCYTES # BLD AUTO: 1.35 K/UL — SIGNIFICANT CHANGE UP (ref 1–3.3)
LYMPHOCYTES # BLD AUTO: 1.37 K/UL — SIGNIFICANT CHANGE UP (ref 1–3.3)
LYMPHOCYTES # BLD AUTO: 17 % — SIGNIFICANT CHANGE UP (ref 13–44)
LYMPHOCYTES # BLD AUTO: 17.2 % — SIGNIFICANT CHANGE UP (ref 13–44)
MAGNESIUM SERPL-MCNC: 2.3 MG/DL — SIGNIFICANT CHANGE UP (ref 1.6–2.6)
MCHC RBC-ENTMCNC: 28.9 PG — SIGNIFICANT CHANGE UP (ref 27–34)
MCHC RBC-ENTMCNC: 29.2 PG — SIGNIFICANT CHANGE UP (ref 27–34)
MCHC RBC-ENTMCNC: 32 G/DL — SIGNIFICANT CHANGE UP (ref 32–36)
MCHC RBC-ENTMCNC: 32.3 G/DL — SIGNIFICANT CHANGE UP (ref 32–36)
MCV RBC AUTO: 89.4 FL — SIGNIFICANT CHANGE UP (ref 80–100)
MCV RBC AUTO: 91.1 FL — SIGNIFICANT CHANGE UP (ref 80–100)
MONOCYTES # BLD AUTO: 0.88 K/UL — SIGNIFICANT CHANGE UP (ref 0–0.9)
MONOCYTES # BLD AUTO: 0.95 K/UL — HIGH (ref 0–0.9)
MONOCYTES NFR BLD AUTO: 10.9 % — SIGNIFICANT CHANGE UP (ref 2–14)
MONOCYTES NFR BLD AUTO: 12.1 % — SIGNIFICANT CHANGE UP (ref 2–14)
NEUTROPHILS # BLD AUTO: 5.14 K/UL — SIGNIFICANT CHANGE UP (ref 1.8–7.4)
NEUTROPHILS # BLD AUTO: 5.45 K/UL — SIGNIFICANT CHANGE UP (ref 1.8–7.4)
NEUTROPHILS NFR BLD AUTO: 65.3 % — SIGNIFICANT CHANGE UP (ref 43–77)
NEUTROPHILS NFR BLD AUTO: 67.3 % — SIGNIFICANT CHANGE UP (ref 43–77)
NRBC # BLD: 0 /100 WBCS — SIGNIFICANT CHANGE UP (ref 0–0)
NRBC # BLD: 0 /100 WBCS — SIGNIFICANT CHANGE UP (ref 0–0)
PHOSPHATE SERPL-MCNC: 3.1 MG/DL — SIGNIFICANT CHANGE UP (ref 2.5–4.5)
PLATELET # BLD AUTO: 123 K/UL — LOW (ref 150–400)
PLATELET # BLD AUTO: 126 K/UL — LOW (ref 150–400)
POTASSIUM SERPL-MCNC: 4.5 MMOL/L — SIGNIFICANT CHANGE UP (ref 3.5–5.3)
POTASSIUM SERPL-MCNC: 4.8 MMOL/L — SIGNIFICANT CHANGE UP (ref 3.5–5.3)
POTASSIUM SERPL-SCNC: 4.5 MMOL/L — SIGNIFICANT CHANGE UP (ref 3.5–5.3)
POTASSIUM SERPL-SCNC: 4.8 MMOL/L — SIGNIFICANT CHANGE UP (ref 3.5–5.3)
PROT SERPL-MCNC: 7.3 G/DL — SIGNIFICANT CHANGE UP (ref 6–8.3)
PROT SERPL-MCNC: 7.8 G/DL — SIGNIFICANT CHANGE UP (ref 6–8.3)
PROTHROM AB SERPL-ACNC: 12.6 SEC — SIGNIFICANT CHANGE UP (ref 9.9–13.4)
PROTHROM AB SERPL-ACNC: 13.2 SEC — SIGNIFICANT CHANGE UP (ref 9.9–13.4)
RBC # BLD: 3.5 M/UL — LOW (ref 4.2–5.8)
RBC # BLD: 3.7 M/UL — LOW (ref 4.2–5.8)
RBC # FLD: 13.8 % — SIGNIFICANT CHANGE UP (ref 10.3–14.5)
RBC # FLD: 13.9 % — SIGNIFICANT CHANGE UP (ref 10.3–14.5)
SODIUM SERPL-SCNC: 139 MMOL/L — SIGNIFICANT CHANGE UP (ref 135–145)
SODIUM SERPL-SCNC: 141 MMOL/L — SIGNIFICANT CHANGE UP (ref 135–145)
WBC # BLD: 7.86 K/UL — SIGNIFICANT CHANGE UP (ref 3.8–10.5)
WBC # BLD: 8.08 K/UL — SIGNIFICANT CHANGE UP (ref 3.8–10.5)
WBC # FLD AUTO: 7.86 K/UL — SIGNIFICANT CHANGE UP (ref 3.8–10.5)
WBC # FLD AUTO: 8.08 K/UL — SIGNIFICANT CHANGE UP (ref 3.8–10.5)

## 2025-01-11 PROCEDURE — 71045 X-RAY EXAM CHEST 1 VIEW: CPT | Mod: 26

## 2025-01-11 RX ORDER — METOPROLOL TARTRATE 50 MG
25 TABLET ORAL
Refills: 0 | Status: DISCONTINUED | OUTPATIENT
Start: 2025-01-11 | End: 2025-01-13

## 2025-01-11 RX ORDER — FUROSEMIDE 20 MG
40 TABLET ORAL ONCE
Refills: 0 | Status: COMPLETED | OUTPATIENT
Start: 2025-01-11 | End: 2025-01-11

## 2025-01-11 RX ORDER — SODIUM CHLORIDE 9 MG/ML
3 INJECTION, SOLUTION INTRAMUSCULAR; INTRAVENOUS; SUBCUTANEOUS EVERY 8 HOURS
Refills: 0 | Status: DISCONTINUED | OUTPATIENT
Start: 2025-01-11 | End: 2025-01-13

## 2025-01-11 RX ORDER — APIXABAN 5 MG/1
5 TABLET, FILM COATED ORAL EVERY 12 HOURS
Refills: 0 | Status: DISCONTINUED | OUTPATIENT
Start: 2025-01-11 | End: 2025-01-13

## 2025-01-11 RX ADMIN — Medication 5 MILLIGRAM(S): at 04:43

## 2025-01-11 RX ADMIN — ATORVASTATIN CALCIUM 10 MILLIGRAM(S): 40 TABLET, FILM COATED ORAL at 21:08

## 2025-01-11 RX ADMIN — SODIUM CHLORIDE 3 MILLILITER(S): 9 INJECTION, SOLUTION INTRAMUSCULAR; INTRAVENOUS; SUBCUTANEOUS at 21:12

## 2025-01-11 RX ADMIN — CHLORHEXIDINE GLUCONATE 1 APPLICATION(S): 1.2 RINSE ORAL at 06:02

## 2025-01-11 RX ADMIN — SODIUM CHLORIDE 3 MILLILITER(S): 9 INJECTION, SOLUTION INTRAMUSCULAR; INTRAVENOUS; SUBCUTANEOUS at 06:05

## 2025-01-11 RX ADMIN — Medication 5 MILLIGRAM(S): at 11:40

## 2025-01-11 RX ADMIN — FENOFIBRATE 145 MILLIGRAM(S): 48 TABLET ORAL at 11:26

## 2025-01-11 RX ADMIN — MACITENTAN 10 MILLIGRAM(S): 10 TABLET, FILM COATED ORAL at 11:26

## 2025-01-11 RX ADMIN — SODIUM CHLORIDE 3 MILLILITER(S): 9 INJECTION, SOLUTION INTRAMUSCULAR; INTRAVENOUS; SUBCUTANEOUS at 13:00

## 2025-01-11 RX ADMIN — PANTOPRAZOLE 40 MILLIGRAM(S): 40 TABLET, DELAYED RELEASE ORAL at 06:04

## 2025-01-11 RX ADMIN — Medication 5 MILLIGRAM(S): at 03:43

## 2025-01-11 RX ADMIN — Medication 12.5 MILLIGRAM(S): at 06:04

## 2025-01-11 RX ADMIN — Medication 25 MILLIGRAM(S): at 17:46

## 2025-01-11 RX ADMIN — CLOPIDOGREL BISULFATE 75 MILLIGRAM(S): 75 TABLET, FILM COATED ORAL at 11:27

## 2025-01-11 RX ADMIN — Medication 40 MILLIGRAM(S): at 10:22

## 2025-01-11 RX ADMIN — HEPARIN SODIUM 7500 UNIT(S): 1000 INJECTION, SOLUTION INTRAVENOUS; SUBCUTANEOUS at 06:03

## 2025-01-11 RX ADMIN — NYSTATIN TOPICAL POWDER 1 APPLICATION(S): 100000 POWDER TOPICAL at 06:05

## 2025-01-11 RX ADMIN — TAMSULOSIN HYDROCHLORIDE 0.4 MILLIGRAM(S): 0.4 CAPSULE ORAL at 21:08

## 2025-01-11 RX ADMIN — SENNOSIDES 2 TABLET(S): 8.6 TABLET, FILM COATED ORAL at 21:08

## 2025-01-11 RX ADMIN — Medication 2: at 12:05

## 2025-01-11 RX ADMIN — APIXABAN 5 MILLIGRAM(S): 5 TABLET, FILM COATED ORAL at 17:46

## 2025-01-11 RX ADMIN — MONTELUKAST SODIUM 10 MILLIGRAM(S): 10 TABLET, FILM COATED ORAL at 11:26

## 2025-01-11 RX ADMIN — NYSTATIN TOPICAL POWDER 1 APPLICATION(S): 100000 POWDER TOPICAL at 17:46

## 2025-01-11 RX ADMIN — Medication 5 MILLIGRAM(S): at 22:24

## 2025-01-11 RX ADMIN — Medication 5 MILLIGRAM(S): at 12:40

## 2025-01-11 RX ADMIN — Medication 5 MILLIGRAM(S): at 23:00

## 2025-01-12 LAB
ANION GAP SERPL CALC-SCNC: 8 MMOL/L — SIGNIFICANT CHANGE UP (ref 5–17)
APTT BLD: 31.9 SEC — SIGNIFICANT CHANGE UP (ref 24.5–35.6)
BUN SERPL-MCNC: 33 MG/DL — HIGH (ref 7–23)
CALCIUM SERPL-MCNC: 9.4 MG/DL — SIGNIFICANT CHANGE UP (ref 8.4–10.5)
CHLORIDE SERPL-SCNC: 95 MMOL/L — LOW (ref 96–108)
CO2 SERPL-SCNC: 32 MMOL/L — HIGH (ref 22–31)
CREAT SERPL-MCNC: 1.58 MG/DL — HIGH (ref 0.5–1.3)
EGFR: 47 ML/MIN/1.73M2 — LOW
GLUCOSE SERPL-MCNC: 176 MG/DL — HIGH (ref 70–99)
HCT VFR BLD CALC: 33 % — LOW (ref 39–50)
HGB BLD-MCNC: 10.6 G/DL — LOW (ref 13–17)
INR BLD: 1.36 — HIGH (ref 0.85–1.16)
MAGNESIUM SERPL-MCNC: 1.8 MG/DL — SIGNIFICANT CHANGE UP (ref 1.6–2.6)
MCHC RBC-ENTMCNC: 29.4 PG — SIGNIFICANT CHANGE UP (ref 27–34)
MCHC RBC-ENTMCNC: 32.1 G/DL — SIGNIFICANT CHANGE UP (ref 32–36)
MCV RBC AUTO: 91.4 FL — SIGNIFICANT CHANGE UP (ref 80–100)
NRBC # BLD: 0 /100 WBCS — SIGNIFICANT CHANGE UP (ref 0–0)
PLATELET # BLD AUTO: 109 K/UL — LOW (ref 150–400)
POTASSIUM SERPL-MCNC: 4.4 MMOL/L — SIGNIFICANT CHANGE UP (ref 3.5–5.3)
POTASSIUM SERPL-SCNC: 4.4 MMOL/L — SIGNIFICANT CHANGE UP (ref 3.5–5.3)
PROTHROM AB SERPL-ACNC: 15.6 SEC — HIGH (ref 9.9–13.4)
RBC # BLD: 3.61 M/UL — LOW (ref 4.2–5.8)
RBC # FLD: 13.8 % — SIGNIFICANT CHANGE UP (ref 10.3–14.5)
SODIUM SERPL-SCNC: 135 MMOL/L — SIGNIFICANT CHANGE UP (ref 135–145)
WBC # BLD: 5.67 K/UL — SIGNIFICANT CHANGE UP (ref 3.8–10.5)
WBC # FLD AUTO: 5.67 K/UL — SIGNIFICANT CHANGE UP (ref 3.8–10.5)

## 2025-01-12 PROCEDURE — 99233 SBSQ HOSP IP/OBS HIGH 50: CPT

## 2025-01-12 PROCEDURE — 99231 SBSQ HOSP IP/OBS SF/LOW 25: CPT

## 2025-01-12 PROCEDURE — 93010 ELECTROCARDIOGRAM REPORT: CPT

## 2025-01-12 PROCEDURE — 71045 X-RAY EXAM CHEST 1 VIEW: CPT | Mod: 26

## 2025-01-12 RX ORDER — ATORVASTATIN CALCIUM 40 MG/1
40 TABLET, FILM COATED ORAL AT BEDTIME
Refills: 0 | Status: DISCONTINUED | OUTPATIENT
Start: 2025-01-12 | End: 2025-01-13

## 2025-01-12 RX ORDER — FUROSEMIDE 20 MG
40 TABLET ORAL DAILY
Refills: 0 | Status: DISCONTINUED | OUTPATIENT
Start: 2025-01-12 | End: 2025-01-13

## 2025-01-12 RX ADMIN — Medication 25 MILLIGRAM(S): at 06:47

## 2025-01-12 RX ADMIN — Medication 25 MILLIGRAM(S): at 18:15

## 2025-01-12 RX ADMIN — SODIUM CHLORIDE 3 MILLILITER(S): 9 INJECTION, SOLUTION INTRAMUSCULAR; INTRAVENOUS; SUBCUTANEOUS at 15:03

## 2025-01-12 RX ADMIN — APIXABAN 5 MILLIGRAM(S): 5 TABLET, FILM COATED ORAL at 06:48

## 2025-01-12 RX ADMIN — SENNOSIDES 2 TABLET(S): 8.6 TABLET, FILM COATED ORAL at 22:06

## 2025-01-12 RX ADMIN — ATORVASTATIN CALCIUM 40 MILLIGRAM(S): 40 TABLET, FILM COATED ORAL at 22:06

## 2025-01-12 RX ADMIN — MACITENTAN 10 MILLIGRAM(S): 10 TABLET, FILM COATED ORAL at 12:13

## 2025-01-12 RX ADMIN — NYSTATIN TOPICAL POWDER 1 APPLICATION(S): 100000 POWDER TOPICAL at 18:31

## 2025-01-12 RX ADMIN — SODIUM CHLORIDE 3 MILLILITER(S): 9 INJECTION, SOLUTION INTRAMUSCULAR; INTRAVENOUS; SUBCUTANEOUS at 06:39

## 2025-01-12 RX ADMIN — Medication 800 MILLIGRAM(S): at 12:12

## 2025-01-12 RX ADMIN — CHLORHEXIDINE GLUCONATE 1 APPLICATION(S): 1.2 RINSE ORAL at 06:38

## 2025-01-12 RX ADMIN — NYSTATIN TOPICAL POWDER 1 APPLICATION(S): 100000 POWDER TOPICAL at 06:48

## 2025-01-12 RX ADMIN — TAMSULOSIN HYDROCHLORIDE 0.4 MILLIGRAM(S): 0.4 CAPSULE ORAL at 22:06

## 2025-01-12 RX ADMIN — CLOPIDOGREL BISULFATE 75 MILLIGRAM(S): 75 TABLET, FILM COATED ORAL at 12:12

## 2025-01-12 RX ADMIN — ACETAMINOPHEN 650 MILLIGRAM(S): 80 SOLUTION/ DROPS ORAL at 16:14

## 2025-01-12 RX ADMIN — Medication 40 MILLIGRAM(S): at 12:12

## 2025-01-12 RX ADMIN — APIXABAN 5 MILLIGRAM(S): 5 TABLET, FILM COATED ORAL at 18:15

## 2025-01-12 RX ADMIN — MONTELUKAST SODIUM 10 MILLIGRAM(S): 10 TABLET, FILM COATED ORAL at 12:12

## 2025-01-12 RX ADMIN — ACETAMINOPHEN 650 MILLIGRAM(S): 80 SOLUTION/ DROPS ORAL at 18:32

## 2025-01-12 RX ADMIN — PANTOPRAZOLE 40 MILLIGRAM(S): 40 TABLET, DELAYED RELEASE ORAL at 06:47

## 2025-01-12 RX ADMIN — FENOFIBRATE 145 MILLIGRAM(S): 48 TABLET ORAL at 12:12

## 2025-01-12 NOTE — PROGRESS NOTE ADULT - ASSESSMENT
69 yoM, former smoker (Quit 1997) with PMH of BPH, PE (2013), DVT (in 2024), STEPHEN, fatty liver, gout, OA, HTN, HLD, T2DM (on Mounjaro), Stage 3 CKD (baseline Cr 1.5), CAD/NSTEMI s/p stent to RCA (10/4/24 at [facility]), Afib s/p ablation (on Xarelto), LV thrombus s/p open heart thrombus extraction at [facility] (2020), pulmonary hypertension (on Opsumit), HFpEF and symptomatic severe aortic stenosis, who was deemed a good candidate for TAVR pending tooth extraction. On 1/4/25 he arrived to [facility] for preop optimization prior to procedure. On 1/6/25 he underwent a tooth extraction with OMFS. Medial optimization. 1/9 patient underwent TAVR (***). Patient with widened QRS intraoperatively with new LBBB. POD 2 TVP out, EP following. 12.5 mg BB started, increased to 25 mg for ectopy. POD 3 patient transferred to floor. PO lasix started. EKG with stable QRS.     Neuro:   No delirium and focal deficits.   Pain:       Cardio:  POD ** s/p **  HTN:  HLD:   Vitals over the last 24 hrs:    HR:     BP:      Pulm:   POD ** s/p **  IS encouraged. Sating at ***  CXR:    GI:   Tolerating diet well   Prophylaxis: 40 mg pantoprazole. ***  Bowel: Senna and PEG. ***     ID:   WBC ***. Afebrile/Febrile.  Monitor fever curve     Renal:   BUN/Creat @ **  I/O net:   Electrolytes: Replete electrolytes prn.     Hem:   H&H @   DVT prophylaxis: SubQ Heparin ***    Endo:   A1C:  TSH:    MSK:   Ambulating well. Continue with PT/OT. ***    Disposition:   Continue with inpatient care. ***   69 yoM, former smoker (Quit 1997) with PMH of BPH, PE (2013), DVT (in 2024), STEPHEN, fatty liver, gout, OA, HTN, HLD, T2DM (on Mounjaro), Stage 3 CKD (baseline Cr 1.5), CAD/NSTEMI s/p stent to RCA (10/4/24 at [facility]), Afib s/p ablation (on Xarelto), LV thrombus s/p open heart thrombus extraction at [facility] (2020), pulmonary hypertension (on Opsumit), HFpEF and symptomatic severe aortic stenosis, who was deemed a good candidate for TAVR pending tooth extraction. On 1/4/25 he arrived to [facility] for preop optimization prior to procedure. On 1/6/25 he underwent a tooth extraction with OMFS. Medial optimization. 1/9 patient underwent TAVR (Iraida 29mm). Patient with widened QRS intraoperatively with new LBBB. POD 2 TVP out, EP following. 12.5 mg BB started, increased to 25 mg for ectopy. POD 3 patient transferred to floor. PO lasix started. EKG with stable QRS, EP assessed patient today.     Neuro:   No delirium and focal deficits.   Pain: Oxycodone     HEENT:   - 1/6 dental extraction      Cardio:  POD 3 severe AS s/p TAVR    - c/w plavix and BB  HFpEF   - 40 mg po lasix started   Afib:   - c/w 5 mg eliquis BID  Hx DVT/PE:   - c/w eliquis   CAD s/p PCI (5/24)   - c/w plavix and eliquis  LBBB:   - Stable. Patient with frequent ectopy. EP following, recommends PVC ablation. No current intervention needed.   HTN: c/w 24 mg lopressor  HLD: increase lipitor to 40 mg   Vitals stable over the last 24 hrs     Pulm:   Pulmonary HTN:   - c/w mecitentan   COPD:   -c/w home inhalers   IS encouraged. Sating at 96%.  CXR: evidence of fluid overload with pulmonary congestion. Lasix started    GI:   Tolerating diet well   Prophylaxis: 40 mg pantoprazole.   Bowel: Senna and PEG.      ID:   WBC . Afebrile.  Monitor fever curve     Renal:   CKD (baseline cr 1.5)   - BUN/Creat @ 33/1.58  Electrolytes: Replete electrolytes prn.     Hem:   H&H @ 10/33  DVT prophylaxis: Eliquis    Endo:   A1C: 5.3  TSH: 3.3    MSK:   Ambulating well. Continue with PT/OT.     Disposition:   Continue with inpatient care. Continue with diuresis.

## 2025-01-12 NOTE — PROGRESS NOTE ADULT - SUBJECTIVE AND OBJECTIVE BOX
Patient discussed on morning rounds with Dr. Arnold    OPERATION & DATE: preop TAVR, 1/6 tooth extraction w/ OMFS    SUBJECTIVE ASSESSMENT:  69yoM seen and examined. Patient feels well, states his mouth pain continues to improve, no bleeding. No complaints. Tolerating PO diet, satting well on room air, ambulating independently. Denies lightheadedness, headache, CP, palpitations, SOB, abdominal pain, n/v/f/c.    VITAL SIGNS:  Vital Signs Last 24 Hrs  T(C): 36.4 (08 Jan 2025 14:11), Max: 36.4 (08 Jan 2025 01:01)  T(F): 97.6 (08 Jan 2025 14:11), Max: 97.6 (08 Jan 2025 01:01)  HR: 70 (08 Jan 2025 11:54) (66 - 94)  BP: 110/50 (08 Jan 2025 11:54) (95/76 - 113/53)  BP(mean): 72 (08 Jan 2025 11:54) (66 - 81)  RR: 16 (08 Jan 2025 11:54) (15 - 17)  SpO2: 94% (08 Jan 2025 11:54) (93% - 97%)    Parameters below as of 08 Jan 2025 11:54  Patient On (Oxygen Delivery Method): room air      I&O's Detail    07 Jan 2025 07:01  -  08 Jan 2025 07:00  --------------------------------------------------------  IN:    Heparin: 279 mL    Oral Fluid: 1230 mL  Total IN: 1509 mL    OUT:    Voided (mL): 1000 mL  Total OUT: 1000 mL    Total NET: 509 mL    PHYSICAL EXAM:  General: NAD, sitting comfortably in chair, conversing appropriately  Neurological: alert and oriented, UE and LE strength equal b/l, facial symmetry present  Cardiovascular: RRR, Clear S1 and S2, +systolic murmur  Respiratory: chest expansion symmetrical, CTA b/l, no wheezing noted  Gastrointestinal: +BS, soft, NT, ND  Extremities: moving spontaneously, no calf tenderness, LLE swelling > RLE  Vascular: warm, well perfused.   Incisions: none    LABS:                        12.8   6.15  )-----------( 165      ( 08 Jan 2025 05:30 )             40.1     PT/INR - ( 08 Jan 2025 05:30 )   PT: 12.1 sec;   INR: 1.03          PTT - ( 08 Jan 2025 10:00 )  PTT:60.7 sec  01-08    132[L]  |  95[L]  |  36[H]  ----------------------------<  102[H]  4.9   |  28  |  1.63[H]    Ca    9.9      08 Jan 2025 05:30  Phos  4.6     01-07  Mg     1.9     01-08      Urinalysis Basic - ( 08 Jan 2025 05:30 )    Color: x / Appearance: x / SG: x / pH: x  Gluc: 102 mg/dL / Ketone: x  / Bili: x / Urobili: x   Blood: x / Protein: x / Nitrite: x   Leuk Esterase: x / RBC: x / WBC x   Sq Epi: x / Non Sq Epi: x / Bacteria: x      MEDICATIONS  (STANDING):  aspirin enteric coated 81 milliGRAM(s) Oral daily  atorvastatin 10 milliGRAM(s) Oral at bedtime  Breztri Aerosphere 160mcg/9mcg/4.8 mcg 160 MICROGram(s) 1 Puff(s) Inhalation two times a day  Breztri Aerosphere Inhaler 1 Puff(s) 1 Puff(s) Inhalation two times a day  chlorhexidine 0.12% Liquid 15 milliLiter(s) Swish and Spit once  chlorhexidine 4% Liquid 1 Application(s) Topical once  chlorhexidine 4% Liquid 1 Application(s) Topical once  dextrose 5%. 1000 milliLiter(s) (100 mL/Hr) IV Continuous <Continuous>  dextrose 5%. 1000 milliLiter(s) (50 mL/Hr) IV Continuous <Continuous>  dextrose 50% Injectable 25 Gram(s) IV Push once  dextrose 50% Injectable 12.5 Gram(s) IV Push once  dextrose 50% Injectable 25 Gram(s) IV Push once  fenofibrate Tablet 145 milliGRAM(s) Oral daily  glucagon  Injectable 1 milliGRAM(s) IntraMuscular once  heparin  Infusion 1500 Unit(s)/Hr (17 mL/Hr) IV Continuous <Continuous>  insulin lispro (ADMELOG) corrective regimen sliding scale   SubCutaneous Before meals and at bedtime  macitentan 10 milliGRAM(s) Oral daily  metoprolol succinate ER 12.5 milliGRAM(s) Oral every 12 hours  montelukast 10 milliGRAM(s) Oral daily  pantoprazole    Tablet 40 milliGRAM(s) Oral before breakfast  senna 2 Tablet(s) Oral at bedtime  sodium chloride 0.9% lock flush 3 milliLiter(s) IV Push every 8 hours  tamsulosin 0.4 milliGRAM(s) Oral at bedtime    MEDICATIONS  (PRN):  acetaminophen     Tablet .. 650 milliGRAM(s) Oral every 6 hours PRN Mild Pain (1 - 3)  dextrose Oral Gel 15 Gram(s) Oral once PRN Blood Glucose LESS THAN 70 milliGRAM(s)/deciliter  oxyCODONE    IR 5 milliGRAM(s) Oral every 6 hours PRN Moderate Pain (4 - 6)  tranexamic acid Injectable for Topical Use 5 milliLiter(s) Topical once PRN tooth extraction bleeding    RADIOLOGY & ADDITIONAL TESTS:    < from: Xray Chest 1 View- PORTABLE-Routine (Xray Chest 1 View- PORTABLE-Routine in AM.) (01.08.25 @ 06:27) >  IMPRESSION:    Postop change. Mild hypoinflation. Possible small pleural effusions. No   consolidation. No pneumothorax.    < end of copied text >  
Patient discussed on morning rounds with Dr. Tubbs    OPERATION & DATE: pre-op TAVR    SUBJECTIVE ASSESSMENT:      Patient reports to be feeling well, has many questions that I took the time to answer. Denies chest pain, SOB, weakness, numbness, N/V, or headaches.     VITAL SIGNS:  Vital Signs Last 24 Hrs  T(C): 36 (06 Jan 2025 13:55), Max: 36.4 (06 Jan 2025 01:01)  T(F): 96.8 (06 Jan 2025 09:13), Max: 97.5 (06 Jan 2025 01:01)  HR: 79 (06 Jan 2025 13:55) (70 - 94)  BP: 107/49 (06 Jan 2025 13:55) (106/59 - 139/64)  BP(mean): 71 (06 Jan 2025 13:55) (71 - 82)  RR: 15 (06 Jan 2025 13:55) (15 - 18)  SpO2: 97% (06 Jan 2025 13:55) (97% - 98%)    Parameters below as of 06 Jan 2025 12:09  Patient On (Oxygen Delivery Method): room air      I&O's Detail    05 Jan 2025 07:01  -  06 Jan 2025 07:00  --------------------------------------------------------  IN:    Heparin: 170 mL    Oral Fluid: 240 mL  Total IN: 410 mL    OUT:    Voided (mL): 2000 mL  Total OUT: 2000 mL    Total NET: -1590 mL      06 Jan 2025 07:01  -  06 Jan 2025 14:10  --------------------------------------------------------  IN:  Total IN: 0 mL    OUT:    Voided (mL): 1220 mL  Total OUT: 1220 mL    Total NET: -1220 mL      PHYSICAL EXAM:  General: Sitting in bed comfortably in NAD  Neuro: A&Ox3, no focal deficits   HEENT: NCAT, EOMI   Cardiac: Regular rate and rhythm, normal S1 and S2. + murmur.    Pulm: Breathing comfortably on room air. No signs of respiratory distress. Lungs are CTA b/l without wheezes, rales, or rhonchi   Abdomen: Soft, non-distended, non-tender. + bowel sounds   Extremities: Warm and well perfused, no peripheral edema, distal pulses 2+. No calf tenderness. SCDs and TEDs in place  MSK: Full AROM       LABS:                        11.4   5.39  )-----------( 161      ( 06 Jan 2025 05:21 )             35.5     PT/INR - ( 06 Jan 2025 05:21 )   PT: 12.9 sec;   INR: 1.10          PTT - ( 06 Jan 2025 05:21 )  PTT:74.8 sec  01-06    135  |  100  |  33[H]  ----------------------------<  97  4.9   |  27  |  1.48[H]    Ca    9.4      06 Jan 2025 12:00  Mg     1.9     01-06      Urinalysis Basic - ( 06 Jan 2025 12:00 )    Color: x / Appearance: x / SG: x / pH: x  Gluc: 97 mg/dL / Ketone: x  / Bili: x / Urobili: x   Blood: x / Protein: x / Nitrite: x   Leuk Esterase: x / RBC: x / WBC x   Sq Epi: x / Non Sq Epi: x / Bacteria: x      MEDICATIONS  (STANDING):  aspirin enteric coated 81 milliGRAM(s) Oral daily  atorvastatin 10 milliGRAM(s) Oral at bedtime  Breztri [id number] mcg/9mcg/4.8mcg 160 MICROGram(s) 1 Puff(s) Inhalation two times a day  fenofibrate Tablet 145 milliGRAM(s) Oral daily  furosemide    Tablet 20 milliGRAM(s) Oral daily  macitentan 10 milliGRAM(s) Oral daily  metoprolol succinate ER 12.5 milliGRAM(s) Oral every 12 hours  montelukast 10 milliGRAM(s) Oral daily  pantoprazole    Tablet 40 milliGRAM(s) Oral before breakfast  potassium chloride    Tablet ER 10 milliEquivalent(s) Oral daily  senna 2 Tablet(s) Oral at bedtime  sodium chloride 0.9% lock flush 3 milliLiter(s) IV Push every 8 hours  tamsulosin 0.4 milliGRAM(s) Oral at bedtime    MEDICATIONS  (PRN):  acetaminophen     Tablet .. 650 milliGRAM(s) Oral every 6 hours PRN Mild Pain (1 - 3)    RADIOLOGY & ADDITIONAL TESTS:      
CTICU  CRITICAL  CARE  attending     Hand off received 	    CHIEF COMPLAINT :    s/p tavr    respiratory failure  				   Pertinent clinical, laboratory, radiographic, hemodynamic, echocardiographic, respiratory data, microbiologic data and chart were reviewed and analyzed frequently throughout the course of the day and night  Patient seen and examined with CTS/ SH attending at bedside    INTERVAL HISTORY  received intubated from or     Pt is a 69y , Male, HEALTH ISSUES - PROBLEM Dx:      , FAMILY HISTORY:  No pertinent family history in first degree relatives    PAST MEDICAL & SURGICAL HISTORY:  BPH (benign prostatic hyperplasia)      Pulmonary embolism      DVT (deep venous thrombosis)      STEPHEN (obstructive sleep apnea)      HTN (hypertension)      Diabetes      Chronic kidney disease (CKD)      CAD (coronary artery disease)      Atrial fibrillation      Left ventricular thrombus      Pulmonary hypertension      Heart failure      Aortic stenosis      History of percutaneous coronary intervention      H/O prior ablation treatment      Left ventricular thrombus without MI        Patient is a 69y old  Male who presents with a chief complaint of medical optimization prior to TAVR (08 Jan 2025 15:51)      14 system review was unremarkable    Vital signs, hemodynamic and respiratory parameters were reviewed from the bedside nursing flowsheet.  ICU Vital Signs Last 24 Hrs  T(C): 36.1 (09 Jan 2025 17:01), Max: 36.8 (09 Jan 2025 05:21)  T(F): 97 (09 Jan 2025 17:01), Max: 98.3 (09 Jan 2025 05:21)  HR: 83 (09 Jan 2025 20:00) (55 - 85)  BP: 101/52 (09 Jan 2025 07:10) (101/52 - 103/51)  BP(mean): 75 (09 Jan 2025 07:10) (72 - 75)  ABP: 128/57 (09 Jan 2025 20:00) (93/38 - 156/59)  ABP(mean): 82 (09 Jan 2025 20:00) (55 - 89)  RR: 14 (09 Jan 2025 20:00) (10 - 18)  SpO2: 99% (09 Jan 2025 20:00) (94% - 100%)    O2 Parameters below as of 09 Jan 2025 20:00  Patient On (Oxygen Delivery Method): nasal cannula, high flow  O2 Flow (L/min): 50  O2 Concentration (%): 50      Adult Advanced Hemodynamics Last 24 Hrs  CVP(mm Hg): --  CVP(cm H2O): --  CO: --  CI: --  PA: --  PA(mean): --  PCWP: --  SVR: --  SVRI: --  PVR: --  PVRI: --, ABG - ( 09 Jan 2025 18:43 )  pH, Arterial: 7.48  pH, Blood: x     /  pCO2: 37    /  pO2: 130   / HCO3: 28    / Base Excess: 4.0   /  SaO2: 98.7              Mode: AC/ CMV (Assist Control/ Continuous Mandatory Ventilation)  RR (machine): 10  TV (machine): 540  FiO2: 50  PEEP: 8  ITime: 1.7  MAP: 13  PIP: 24    Intake and output was reviewed and the fluid balance was calculated  Daily Height in cm: 177.8 (09 Jan 2025 07:10)    Daily   I&O's Summary    08 Jan 2025 07:01  -  09 Jan 2025 07:00  --------------------------------------------------------  IN: 359 mL / OUT: 150 mL / NET: 209 mL    09 Jan 2025 07:01  -  09 Jan 2025 20:28  --------------------------------------------------------  IN: 974.6 mL / OUT: 600 mL / NET: 374.6 mL        All lines and drain sites were assessed  [facility]      POCT Blood Glucose.: 151 mg/dL (09 Jan 2025 16:37)    No acute change in mental status  Auscultation of the chest reveals equal bs  Abdomen is soft  Extremities are warm and well perfused  Wounds appear clean and unremarkable  Antibiotics are periop    labs  CBC Full  -  ( 09 Jan 2025 19:28 )  WBC Count : 7.70 K/uL  RBC Count : 3.58 M/uL  Hemoglobin : 10.5 g/dL  Hematocrit : 31.7 %  Platelet Count - Automated : 146 K/uL  Mean Cell Volume : 88.5 fl  Mean Cell Hemoglobin : 29.3 pg  Mean Cell Hemoglobin Concentration : 33.1 g/dL  Auto Neutrophil # : 6.77 K/uL  Auto Lymphocyte # : 0.63 K/uL  Auto Monocyte # : 0.26 K/uL  Auto Eosinophil # : 0.01 K/uL  Auto Basophil # : 0.01 K/uL  Auto Neutrophil % : 87.9 %  Auto Lymphocyte % : 8.2 %  Auto Monocyte % : 3.4 %  Auto Eosinophil % : 0.1 %  Auto Basophil % : 0.1 %    01-09    134[L]  |  94[L]  |  32[H]  ----------------------------<  142[H]  4.8   |  28  |  1.67[H]    Ca    9.2      09 Jan 2025 19:28  Phos  3.2     01-09  Mg     2.1     01-09    TPro  7.2  /  Alb  4.0  /  TBili  0.3  /  DBili  x   /  AST  35  /  ALT  19  /  AlkPhos  37[L]  01-09    PT/INR - ( 09 Jan 2025 19:28 )   PT: 12.6 sec;   INR: 1.08          PTT - ( 09 Jan 2025 19:28 )  PTT:26.4 sec  The current medications were reviewed   MEDICATIONS  (STANDING):  atorvastatin 10 milliGRAM(s) Oral at bedtime  Breztri Aerosphere 160mcg/9mcg/4.8 mcg 160 MICROGram(s) 1 Puff(s) Inhalation two times a day  Breztri Aerosphere Inhaler 1 Puff(s) 1 Puff(s) Inhalation two times a day  ceFAZolin   IVPB 2000 milliGRAM(s) IV Intermittent every 8 hours  chlorhexidine 2% Cloths 1 Application(s) Topical daily  dextrose 5%. 1000 milliLiter(s) (100 mL/Hr) IV Continuous <Continuous>  dextrose 5%. 1000 milliLiter(s) (50 mL/Hr) IV Continuous <Continuous>  dextrose 50% Injectable 25 Gram(s) IV Push once  dextrose 50% Injectable 12.5 Gram(s) IV Push once  dextrose 50% Injectable 25 Gram(s) IV Push once  fenofibrate Tablet 145 milliGRAM(s) Oral daily  glucagon  Injectable 1 milliGRAM(s) IntraMuscular once  insulin lispro (ADMELOG) corrective regimen sliding scale   SubCutaneous Before meals and at bedtime  macitentan 10 milliGRAM(s) Oral daily  metoprolol succinate ER 12.5 milliGRAM(s) Oral every 12 hours  montelukast 10 milliGRAM(s) Oral daily  norepinephrine Infusion 0.05 MICROgram(s)/kG/Min (13.1 mL/Hr) IV Continuous <Continuous>  pantoprazole    Tablet 40 milliGRAM(s) Oral before breakfast  propofol Infusion 20 MICROgram(s)/kG/Min (16.8 mL/Hr) IV Continuous <Continuous>  senna 2 Tablet(s) Oral at bedtime  sodium chloride 0.9% lock flush 3 milliLiter(s) IV Push every 8 hours  tamsulosin 0.4 milliGRAM(s) Oral at bedtime    MEDICATIONS  (PRN):  acetaminophen     Tablet .. 650 milliGRAM(s) Oral every 6 hours PRN Mild Pain (1 - 3)  acetaminophen     Tablet .. 650 milliGRAM(s) Oral every 6 hours PRN Mild Pain (1 - 3)  dextrose Oral Gel 15 Gram(s) Oral once PRN Blood Glucose LESS THAN 70 milliGRAM(s)/deciliter  oxyCODONE    IR 5 milliGRAM(s) Oral every 6 hours PRN Moderate Pain (4 - 6)  polyethylene glycol 3350 17 Gram(s) Oral daily PRN Constipation       PROBLEM LIST/ ASSESSMENT:  HEALTH ISSUES - PROBLEM Dx:      ,   Patient is a 69y old  Male who presents with a chief complaint of medical optimization prior to TAVR (08 Jan 2025 15:51)     s/p cardiac surgery      Acute post operative pulmonary insufficiency ruled in due to hypoxemia, O2 sats < 91% on RA treated with HFNC    post op htypoxic resp failure      My plan includes :    Arrived intubated due to hypoxia     Recruited vt 700 pplat 21     Diuresed     close hemodynamic, ventilatory and drain monitoring and management per post op routine    Monitor for arrhythmias and monitor parameters for organ perfusion  beta blockade not administered due to hemodynamic instability and bradycardia  monitor neurologic status  Head of the bed should remain elevated to 45 deg .   chest PT and IS will be encouraged  monitor adequacy of oxygenation and ventilation and attempt to wean oxygen  antibiotic regimen will be tailored to the clinical, laboratory and microbiologic data  Nutritional goals will be met using po eventually , ensure adequate caloric intake and montior the same  Stress ulcer and VTE prophylaxis will be achieved    Glycemic control is satisfactory  Electrolytes have been repleted as necessary and wound care has been carried out. Pain control has been achieved.   agressive physical therapy and early mobility and ambulation goals will be met   The family was updated about the course and plan  CRITICAL CARE TIME Upon my evaluation, this patient had a high probability of imminent or life-threatening deterioration due to the above problems which required my direct attention, intervention, and personal management.  I have personally provided 110 minutes of critical care time exclusive of time spent on separately billable procedures. Time included review of laboratory data, radiology results, discussion with consultants, and monitoring for potential decompensation. Interventions were performed as documented abovepersonally provided by me  in evaluation and management, reassessments, review and interpretation of labs and x-rays, ventilator and hemodynamic management, formulating a plan and coordinating care. Time spent was non routine post-operarive caRE and included multiple and repeated evaluations at the bedside  Time does not include procedural time.    CTICU ATTENDING     					    Jaspreet Hayes MD                        	
Cardiology Fellow Consult Progress Note    Subjective:    No acute events overnight. No significant tele events. ROS negative at the bedside.     REVIEW OF SYSTEMS:  CONSTITUTIONAL: No weakness, fevers or chills  EYES/ENT: No visual changes;  No dysphagia  NECK: No pain or stiffness  RESPIRATORY: No cough, wheezing, hemoptysis; No shortness of breath  CARDIOVASCULAR: No chest pain or palpitations; No lower extremity edema  GASTROINTESTINAL: No abdominal or epigastric pain. No nausea, vomiting, or hematemesis; No diarrhea or constipation. No melena or hematochezia.  BACK: No back pain  GENITOURINARY: No dysuria, frequency or hematuria  NEUROLOGICAL: No numbness or weakness  SKIN: No itching, burning, rashes, or lesions   All other review of systems is negative unless indicated above.    Physical Exam:  T(F): 97 (01-12), Max: 98.8 (01-11)  HR: 95 (01-12) (71 - 95)  BP: 108/53 (01-12) (85/50 - 135/61)  BP(mean): 76 (01-12) (62 - 84)  ABP: --  ABP(mean): --  RR: 18 (01-12)  SpO2: 92% (01-12)  GENERAL: No acute distress, well-developed  HEAD:  Atraumatic, Normocephalic  ENT: EOMI, PERRLA, conjunctiva and sclera clear, Neck supple, No JVD, moist mucosa  CHEST/LUNG: Clear to auscultation bilaterally; No wheeze, equal breath sounds bilaterally   BACK: No spinal tenderness  HEART: Regular rate and rhythm; No murmurs, rubs, or gallops  ABDOMEN: Soft, Nontender, Nondistended; Bowel sounds present  EXTREMITIES:  No clubbing, cyanosis, or edema  PSYCH: Nl behavior, nl affect  NEUROLOGY: AAOx3, non-focal, cranial nerves intact  SKIN: Normal color, No rashes or lesions  LINES:    Cardiovascular diagnostic testings: personally reviewed    Imaging diagnostic testings: personally reviewed    Labs: Personally reviewed                        10.6   5.67  )-----------( 109      ( 12 Jan 2025 10:00 )             33.0     01-12    x   |  x   |  x   ----------------------------<  176[H]  4.4   |  x   |  x     Ca    9.4      12 Jan 2025 10:00  Phos  3.1     01-11  Mg     1.8     01-12    TPro  7.8  /  Alb  4.1  /  TBili  0.4  /  DBili  x   /  AST  31  /  ALT  12  /  AlkPhos  50  01-11    PT/INR - ( 12 Jan 2025 10:00 )   PT: 15.6 sec;   INR: 1.36          PTT - ( 12 Jan 2025 10:00 )  PTT:31.9 sec                  
ORAL & MAXILLOFACIAL SURGERY PROGRESS NOTE    I35.0        HENRRY VASQUEZ  |  [id number]      Patient is a 69y Male POD1 s/p dental extraction       S: JOANNA overnight. Pt seen and evaluated bedside this AM. Pt resting comfortably on exam. Tolerating Diet. Ambulating and voiding without issue. Pain well controlled.    MEDICATIONS  (STANDING):  aspirin enteric coated 81 milliGRAM(s) Oral daily  atorvastatin 10 milliGRAM(s) Oral at bedtime  Breztri Aerosphere 160mcg/9mcg/4.8 mcg 160 MICROGram(s) 1 Puff(s) Inhalation two times a day  Breztri Aerosphere Inhaler 1 Puff(s) 1 Puff(s) Inhalation two times a day  fenofibrate Tablet 145 milliGRAM(s) Oral daily  furosemide    Tablet 20 milliGRAM(s) Oral daily  macitentan 10 milliGRAM(s) Oral daily  metoprolol succinate ER 12.5 milliGRAM(s) Oral every 12 hours  montelukast 10 milliGRAM(s) Oral daily  pantoprazole    Tablet 40 milliGRAM(s) Oral before breakfast  potassium chloride    Tablet ER 10 milliEquivalent(s) Oral daily  senna 2 Tablet(s) Oral at bedtime  tamsulosin 0.4 milliGRAM(s) Oral at bedtime    MEDICATIONS  (PRN):  acetaminophen     Tablet .. 650 milliGRAM(s) Oral every 6 hours PRN Mild Pain (1 - 3)  oxyCODONE    IR 5 milliGRAM(s) Oral every 6 hours PRN Moderate Pain (4 - 6)      O:   Vital Signs Last 24 Hrs  T(C): 36.4 (07 Jan 2025 05:49), Max: 36.8 (07 Jan 2025 01:15)  T(F): 97.6 (07 Jan 2025 05:49), Max: 98.2 (07 Jan 2025 01:15)  HR: 91 (07 Jan 2025 03:42) (74 - 94)  BP: 123/64 (07 Jan 2025 03:42) (83/42 - 139/64)  BP(mean): 88 (07 Jan 2025 03:42) (59 - 88)  RR: 18 (07 Jan 2025 03:42) (15 - 25)  SpO2: 96% (06 Jan 2025 16:31) (96% - 98%)    Parameters below as of 07 Jan 2025 03:42  Patient On (Oxygen Delivery Method): room air        PHYSICAL EXAM:  GENERAL: NAD, well-groomed, well-developed  HEENT: NC/AT  INTRAORAL: Extraction site #4 hemostatic, c/d/i  CHEST/LUNG: Breathing even, unlabored  HEART: Regular rate and rhythm  ABDOMEN: Soft, nondistended.  EXTREMITIES: good distal pulses b/l   NEURO:  No focal deficits                          11.6   6.23  )-----------( 177      ( 07 Jan 2025 05:30 )             36.4     01-07    135  |  98  |  39[H]  ----------------------------<  100[H]  5.2   |  31  |  1.94[H]    Ca    9.7      07 Jan 2025 05:30  Phos  4.6     01-07  Mg     2.0     01-07 01-06-25 @ 07:01  -  01-07-25 @ 07:00  --------------------------------------------------------  IN: 250 mL / OUT: 1845 mL / NET: -1595 mL        IMAGING STUDIES:      
Patient discussed on morning rounds with Dr. Tubbs     preop tooth extraction on 1/6/25 with OMFS  preop TAVR 1/9/25     SUBJECTIVE ASSESSMENT: Pt is feeling well this morning, no complaints. Denies any chest pain, palpitations, orthopnea, dyspnea on exertion, shortness of breath, wheezing, abd pain, nausea, vomiting, constipation, lightheadedness, headaches, fevers, or chills.    VITAL SIGNS:  Vital Signs Last 24 Hrs  T(C): 36.1 (05 Jan 2025 05:19), Max: 36.2 (04 Jan 2025 17:00)  T(F): 97 (05 Jan 2025 05:19), Max: 97.2 (04 Jan 2025 21:24)  HR: 73 (05 Jan 2025 05:30) (73 - 93)  BP: 124/57 (05 Jan 2025 05:30) (98/60 - 124/57)  BP(mean): 82 (05 Jan 2025 05:30) (74 - 82)  RR: 16 (05 Jan 2025 05:30) (14 - 16)  SpO2: 97% (05 Jan 2025 05:30) (97% - 98%)    Parameters below as of 05 Jan 2025 05:30  Patient On (Oxygen Delivery Method): room air      I&O's Detail    04 Jan 2025 07:01  -  05 Jan 2025 07:00  --------------------------------------------------------  IN:  Total IN: 0 mL    OUT:    Voided (mL): 700 mL  Total OUT: 700 mL    Total NET: -700 mL      PHYSICAL EXAM:  General: resting in chair in NAD   HEENT: normocephalic. atraumatic   Cardio: normal s1/s2 + murmur   Pulm: lungs cta b/l  GI: +BS 4 quadrants   Extremities: baseline Left leg slightly more swollen than right leg, no calf tenderness  Vascular: dP 2+ b/l   Incisions: hx of previous sternotomy well healed     LABS:                        11.4   6.29  )-----------( 159      ( 05 Jan 2025 05:30 )             35.0     PT/INR - ( 05 Jan 2025 05:30 )   PT: 14.0 sec;   INR: 1.22     PTT - ( 05 Jan 2025 05:30 )  PTT:57.3 sec    01-05    138  |  103  |  x   ----------------------------<  93  4.4   |  25  |  x     Ca    9.4      05 Jan 2025 05:30  Mg     1.9     01-05    TPro  8.6[H]  /  Alb  4.6  /  TBili  0.5  /  DBili  x   /  AST  24  /  ALT  14  /  AlkPhos  49  01-04    Urinalysis Basic - ( 05 Jan 2025 05:30 )    Color: x / Appearance: x / SG: x / pH: x  Gluc: 93 mg/dL / Ketone: x  / Bili: x / Urobili: x   Blood: x / Protein: x / Nitrite: x   Leuk Esterase: x / RBC: x / WBC x   Sq Epi: x / Non Sq Epi: x / Bacteria: x    MEDICATIONS  (STANDING):  aspirin enteric coated 81 milliGRAM(s) Oral daily  atorvastatin 10 milliGRAM(s) Oral at bedtime  Breztri [id number] mcg/9mcg/4.8mcg 160 MICROGram(s) 1 Puff(s) Inhalation two times a day  fenofibrate Tablet 145 milliGRAM(s) Oral daily  heparin  Infusion 1600 Unit(s)/Hr (17 mL/Hr) IV Continuous <Continuous>  macitentan 10 milliGRAM(s) Oral daily  metoprolol succinate ER 12.5 milliGRAM(s) Oral every 12 hours  montelukast 10 milliGRAM(s) Oral daily  sodium chloride 0.9% lock flush 3 milliLiter(s) IV Push every 8 hours  tamsulosin 0.4 milliGRAM(s) Oral at bedtime    MEDICATIONS  (PRN):    RADIOLOGY & < from: Xray Chest 1 View-PORTABLE IMMEDIATE (Xray Chest 1 View-PORTABLE IMMEDIATE .) (01.04.25 @ 15:07) >  Midline sternotomy wires. Bibasilar trace pleural effusions/atelectasis.  Normal cardiomediastinal and hilar silhouettes.  No pneumothorax. Soft   tissues and osseous structures are within normal limits.  < end of copied text >  ADDITIONAL TESTS:    
CTICU  CRITICAL  CARE  attending     Hand off received 					   Pertinent clinical, laboratory, radiographic, hemodynamic, echocardiographic, respiratory data, microbiologic data and chart were reviewed and analyzed frequently throughout the course of the day and night      69 year old male  with Type II DM, HTN, HLD,  BPH, PE (2013), DVT (in 2024), STEPHEN, fatty liver, gout, OA, (on Mounjaro), Stage 3 CKD (baseline Cr 1.5),   He has history of CAD/NSTEMI s/p stent to RCA (10/4/24 at [facility]), Afib s/p ablation (on Xarelto), LV thrombus s/p open heart thrombus extraction at [facility] (2020), pulmonary hypertension (on Opsumit), HFpEF   He is a former smoker (Quit 1997).  He was evaluated for CHF.   Work up revealed severe aortic stenosis.    S/P TAVR.       FAMILY HISTORY:  No pertinent family history in first degree relatives    PAST MEDICAL & SURGICAL HISTORY:  BPH (benign prostatic hyperplasia)  Pulmonary embolism  DVT (deep venous thrombosis)  STEPHEN (obstructive sleep apnea)  HTN (hypertension)  Diabetes  Chronic kidney disease (CKD)  CAD (coronary artery disease)  Atrial fibrillation  Left ventricular thrombus  Pulmonary hypertension  Heart failure  Aortic stenosis  History of percutaneous coronary intervention  H/O prior ablation treatment  Left ventricular thrombus without MI            14 system review was unremarkable    Vital signs, hemodynamic and respiratory parameters were reviewed from the bedside nursing flow sheet.  ICU Vital Signs Last 24 Hrs  T(C): 36.2 (09 Jan 2025 21:02), Max: 36.8 (09 Jan 2025 05:21)  T(F): 97.2 (09 Jan 2025 21:02), Max: 98.3 (09 Jan 2025 05:21)  HR: 88 (09 Jan 2025 22:00) (55 - 88)  BP: 132/65 (09 Jan 2025 22:00) (101/52 - 132/65)  BP(mean): 93 (09 Jan 2025 22:00) (73 - 93)  ABP: 140/61 (09 Jan 2025 22:00) (93/38 - 156/59)  ABP(mean): 85 (09 Jan 2025 22:00) (55 - 89)  RR: 17 (09 Jan 2025 22:00) (10 - 18)  SpO2: 100% (09 Jan 2025 22:00) (94% - 100%)    O2 Parameters below as of 09 Jan 2025 22:00  Patient On (Oxygen Delivery Method): nasal cannula, high flow  O2 Flow (L/min): 50  O2 Concentration (%): 50      Adult Advanced Hemodynamics Last 24 Hrs  CVP(mm Hg): --  CVP(cm H2O): --  CO: --  CI: --  PA: --  PA(mean): --  PCWP: --  SVR: --  SVRI: --  PVR: --  PVRI: --, ABG - ( 09 Jan 2025 21:58 )  pH, Arterial: 7.36  pH, Blood: x     /  pCO2: 54    /  pO2: 145   / HCO3: 30    / Base Excess: 3.7   /  SaO2: 98.5              Mode: AC/ CMV (Assist Control/ Continuous Mandatory Ventilation)  RR (machine): 10  TV (machine): 540  FiO2: 50  PEEP: 8  ITime: 1.7  MAP: 13  PIP: 24    Intake and output was reviewed and the fluid balance was calculated  Daily Height in cm: 177.8 (09 Jan 2025 07:10)    Daily   I&O's Summary    08 Jan 2025 07:01  -  09 Jan 2025 07:00  --------------------------------------------------------  IN: 359 mL / OUT: 150 mL / NET: 209 mL    09 Jan 2025 07:01  -  09 Jan 2025 23:25  --------------------------------------------------------  IN: 974.6 mL / OUT: 915 mL / NET: 59.6 mL            Neuro: No change in the mental status from the baseline.  Neck: No JVD.  CVS: S1, S2, No S3.  Lungs: Good air entry bilaterally.  Abd: Soft. No tenderness. + Bowel sounds.  Vascular: + DP/PT.  Extremities: No edema.  Lymphatic: Normal.  Skin: No abnormalities.      labs  CBC Full  -  ( 09 Jan 2025 22:01 )  WBC Count : 10.97 K/uL  RBC Count : 3.59 M/uL  Hemoglobin : 10.4 g/dL  Hematocrit : 31.9 %  Platelet Count - Automated : 142 K/uL  Mean Cell Volume : 88.9 fl  Mean Cell Hemoglobin : 29.0 pg  Mean Cell Hemoglobin Concentration : 32.6 g/dL  Auto Neutrophil # : 9.59 K/uL  Auto Lymphocyte # : 0.70 K/uL  Auto Monocyte # : 0.62 K/uL  Auto Eosinophil # : 0.01 K/uL  Auto Basophil # : 0.02 K/uL  Auto Neutrophil % : 87.3 %  Auto Lymphocyte % : 6.4 %  Auto Monocyte % : 5.7 %  Auto Eosinophil % : 0.1 %  Auto Basophil % : 0.2 %    01-09    135  |  96  |  32[H]  ----------------------------<  122[H]  4.6   |  28  |  1.62[H]    Ca    9.0      09 Jan 2025 22:01  Phos  3.2     01-09  Mg     2.1     01-09    TPro  7.0  /  Alb  4.0  /  TBili  0.3  /  DBili  x   /  AST  34  /  ALT  18  /  AlkPhos  37[L]  01-09    PT/INR - ( 09 Jan 2025 22:01 )   PT: 12.6 sec;   INR: 1.08          PTT - ( 09 Jan 2025 22:01 )  PTT:27.4 sec  The current medications were reviewed   MEDICATIONS  (STANDING):  atorvastatin 10 milliGRAM(s) Oral at bedtime  Breztri Aerosphere 160mcg/9mcg/4.8 mcg 160 MICROGram(s) 1 Puff(s) Inhalation two times a day  Breztri Aerosphere Inhaler 1 Puff(s) 1 Puff(s) Inhalation two times a day  chlorhexidine 2% Cloths 1 Application(s) Topical daily  dextrose 5%. 1000 milliLiter(s) (100 mL/Hr) IV Continuous <Continuous>  dextrose 5%. 1000 milliLiter(s) (50 mL/Hr) IV Continuous <Continuous>  dextrose 50% Injectable 25 Gram(s) IV Push once  dextrose 50% Injectable 12.5 Gram(s) IV Push once  dextrose 50% Injectable 25 Gram(s) IV Push once  fenofibrate Tablet 145 milliGRAM(s) Oral daily  glucagon  Injectable 1 milliGRAM(s) IntraMuscular once  insulin lispro (ADMELOG) corrective regimen sliding scale   SubCutaneous Before meals and at bedtime  macitentan 10 milliGRAM(s) Oral daily  metoprolol succinate ER 12.5 milliGRAM(s) Oral every 12 hours  montelukast 10 milliGRAM(s) Oral daily  norepinephrine Infusion 0.05 MICROgram(s)/kG/Min (13.1 mL/Hr) IV Continuous <Continuous>  pantoprazole    Tablet 40 milliGRAM(s) Oral before breakfast  propofol Infusion 20 MICROgram(s)/kG/Min (16.8 mL/Hr) IV Continuous <Continuous>  senna 2 Tablet(s) Oral at bedtime  sodium chloride 0.9% lock flush 3 milliLiter(s) IV Push every 8 hours  tamsulosin 0.4 milliGRAM(s) Oral at bedtime    MEDICATIONS  (PRN):  acetaminophen     Tablet .. 650 milliGRAM(s) Oral every 6 hours PRN Mild Pain (1 - 3)  acetaminophen     Tablet .. 650 milliGRAM(s) Oral every 6 hours PRN Mild Pain (1 - 3)  dextrose Oral Gel 15 Gram(s) Oral once PRN Blood Glucose LESS THAN 70 milliGRAM(s)/deciliter  oxyCODONE    IR 5 milliGRAM(s) Oral every 6 hours PRN Moderate Pain (4 - 6)  polyethylene glycol 3350 17 Gram(s) Oral daily PRN Constipation          69 year old  Male with chronic diastolic CHF and aortic stenosis.  S/P TAVR  Hemodynamically stable.  Good oxygenation.  Fair urine out put.        My plan includes :  Bronchodilator Rx   Statin and Betablocker.  Macitentan for pulmonary HTN.  Close hemodynamic monitoring   Monitor for arrhythmias and monitor parameters for organ perfusion  Monitor neurologic status  Monitor renal function.  Avoid nephrotoxic meds.   Head of the bed should remain elevated to 45 deg .   Chest PT and IS will be encouraged  Monitor adequacy of oxygenation   Nutritional goals will be met using po eventually , ensure adequate caloric intake and monitor the same  Stress ulcer and VTE prophylaxis will be achieved    Glycemic control is satisfactory  Electrolytes have been repleted as necessary and wound care has been carried out. Pain control has been achieved.   Aggressive physical therapy and early mobility and ambulation goals will be met   The family was updated about the course and plan  CRITICAL CARE TIME SPENT in evaluation and management, reassessments, review and interpretation of labs and x-rays, hemodynamic management, formulating a plan and coordinating care: ___30____ MIN.  Time does not include procedural time.  CTICU ATTENDING     					    Eran Juarez MD                        	
CTICU  CRITICAL  CARE  attending     Hand off received 					   Pertinent clinical, laboratory, radiographic, hemodynamic, echocardiographic, respiratory data, microbiologic data and chart were reviewed and analyzed frequently throughout the course of the day and night  Patient seen and examined with CTS/ SH attending at bedside    Pt is a 69y , Male, post procedure day # 1 s/p TF-TAVR; with a Iraida valve    post procedure    new LBBB  frequent PVCs  prolonged ND interval    seen by EP  suggests starting low dose BB and up-titrating as tolerated  diuresed  RIJ TVP to remain in place x 24 hrs    69M, former smoker (Quit 1997) with PMH of BPH, PE (2013), DVT (in 2024), STEPHEN, fatty liver, gout, OA, HTN, HLD, T2DM (on Mounjaro), Stage 3 CKD (baseline Cr 1.5), CAD/NSTEMI s/p stent to RCA (10/4/24 at [facility]), Afib s/p ablation (on Xarelto), LV thrombus s/p open heart thrombus extraction at [facility] (2020), pulmonary hypertension (on Opsumit), HFpEF and symptomatic severe aortic stenosis, deemed a good candidate for TAVR pending dental clearance. Arrived today 1/4/25 as planned admission for medical optimization (including tooth extraction on Monday 1/6/25) prior to TAVR this admission.         , FAMILY HISTORY:  No pertinent family history in first degree relatives    PAST MEDICAL & SURGICAL HISTORY:  BPH (benign prostatic hyperplasia)      Pulmonary embolism      DVT (deep venous thrombosis)      STEPHEN (obstructive sleep apnea)      HTN (hypertension)      Diabetes      Chronic kidney disease (CKD)      CAD (coronary artery disease)      Atrial fibrillation      Left ventricular thrombus      Pulmonary hypertension      Heart failure      Aortic stenosis      History of percutaneous coronary intervention      H/O prior ablation treatment      Left ventricular thrombus without MI        Patient is a 69y old  Male who presents for  medical optimization prior to TAVR (10 Sulaiman 2025 11:49)      14 system review limited 2/2 post op morbidity    Vital signs, hemodynamic and respiratory parameters were reviewed from the bedside nursing flowsheet.  ICU Vital Signs Last 24 Hrs  T(C): 36.6 (10 Sulaiman 2025 13:39), Max: 36.9 (10 Sulaiman 2025 06:01)  T(F): 97.8 (10 Sulaiman 2025 13:39), Max: 98.4 (10 Sulaiman 2025 06:01)  HR: 73 (10 Sulaiman 2025 16:00) (55 - 96)  BP: 115/53 (10 Sulaiman 2025 16:00) (111/55 - 132/65)  BP(mean): 76 (10 Sulaiman 2025 16:00) (76 - 93)  ABP: 132/47 (10 Sulaiman 2025 11:00) (101/42 - 153/63)  ABP(mean): 73 (10 Sulaiman 2025 11:00) (60 - 91)  RR: 18 (10 Sulaiman 2025 16:00) (11 - 18)  SpO2: 93% (10 Sulaiman 2025 16:00) (93% - 100%)    O2 Parameters below as of 10 Sulaiman 2025 17:00  Patient On (Oxygen Delivery Method): nasal cannula w/ humidification  O2 Flow (L/min): 2        Adult Advanced Hemodynamics Last 24 Hrs  CVP(mm Hg): --  CVP(cm H2O): --  CO: --  CI: --  PA: --  PA(mean): --  PCWP: --  SVR: --  SVRI: --  PVR: --  PVRI: --, ABG - ( 10 Sulaiman 2025 04:16 )  pH, Arterial: 7.39  pH, Blood: x     /  pCO2: 51    /  pO2: 90    / HCO3: 31    / Base Excess: 4.7   /  SaO2: 98.2              Mode: AC/ CMV (Assist Control/ Continuous Mandatory Ventilation)  RR (machine): 10  TV (machine): 540  FiO2: 50  PEEP: 8  ITime: 1.7  MAP: 13  PIP: 24    Intake and output was reviewed and the fluid balance was calculated  Daily     Daily   I&O's Summary    09 Jan 2025 07:01  -  10 Sulaiman 2025 07:00  --------------------------------------------------------  IN: 1024.6 mL / OUT: 1275 mL / NET: -250.4 mL    10 Sulaiman 2025 07:01  -  10 Sulaiman 2025 16:27  --------------------------------------------------------  IN: 0 mL / OUT: 2320 mL / NET: -2320 mL        All lines and drain sites were assessed  [facility]      POCT Blood Glucose.: 136 mg/dL (10 Sulaiman 2025 11:44)    No acute change in mental status  Auscultation of the chest reveals equal bs  Abdomen is soft  Extremities are warm and well perfused  Wounds appear clean and unremarkable  Antibiotics are periop    labs  CBC Full  -  ( 10 Sulaiman 2025 04:20 )  WBC Count : 9.47 K/uL  RBC Count : 3.47 M/uL  Hemoglobin : 10.3 g/dL  Hematocrit : 31.0 %  Platelet Count - Automated : 132 K/uL  Mean Cell Volume : 89.3 fl  Mean Cell Hemoglobin : 29.7 pg  Mean Cell Hemoglobin Concentration : 33.2 g/dL  Auto Neutrophil # : 8.19 K/uL  Auto Lymphocyte # : 0.77 K/uL  Auto Monocyte # : 0.44 K/uL  Auto Eosinophil # : 0.02 K/uL  Auto Basophil # : 0.01 K/uL  Auto Neutrophil % : 86.6 %  Auto Lymphocyte % : 8.1 %  Auto Monocyte % : 4.6 %  Auto Eosinophil % : 0.2 %  Auto Basophil % : 0.1 %    01-10    138  |  97  |  28[H]  ----------------------------<  146[H]  4.4   |  29  |  1.50[H]    Ca    9.2      10 Sulaiman 2025 04:20  Phos  3.8     01-10  Mg     2.0     01-10    TPro  6.8  /  Alb  4.0  /  TBili  0.2  /  DBili  x   /  AST  36  /  ALT  16  /  AlkPhos  36[L]  01-10    PT/INR - ( 10 Sulaiman 2025 04:20 )   PT: 13.1 sec;   INR: 1.12          PTT - ( 10 Sulaiman 2025 04:20 )  PTT:26.2 sec  The current medications were reviewed   MEDICATIONS  (STANDING):  atorvastatin 10 milliGRAM(s) Oral at bedtime  Breztri Aerosphere 160mcg/9mcg/4.8 mcg 160 MICROGram(s) 1 Puff(s) Inhalation two times a day  Breztri Aerosphere Inhaler 1 Puff(s) 1 Puff(s) Inhalation two times a day  chlorhexidine 2% Cloths 1 Application(s) Topical daily  clopidogrel Tablet 75 milliGRAM(s) Oral daily  dextrose 5%. 1000 milliLiter(s) (100 mL/Hr) IV Continuous <Continuous>  dextrose 5%. 1000 milliLiter(s) (50 mL/Hr) IV Continuous <Continuous>  dextrose 50% Injectable 25 Gram(s) IV Push once  dextrose 50% Injectable 12.5 Gram(s) IV Push once  dextrose 50% Injectable 25 Gram(s) IV Push once  fenofibrate Tablet 145 milliGRAM(s) Oral daily  glucagon  Injectable 1 milliGRAM(s) IntraMuscular once  insulin lispro (ADMELOG) corrective regimen sliding scale   SubCutaneous Before meals and at bedtime  macitentan 10 milliGRAM(s) Oral daily  montelukast 10 milliGRAM(s) Oral daily  norepinephrine Infusion 0.05 MICROgram(s)/kG/Min (13.1 mL/Hr) IV Continuous <Continuous>  nystatin Powder 1 Application(s) Topical two times a day  pantoprazole    Tablet 40 milliGRAM(s) Oral before breakfast  senna 2 Tablet(s) Oral at bedtime  sodium chloride 0.9% lock flush 3 milliLiter(s) IV Push every 8 hours  tamsulosin 0.4 milliGRAM(s) Oral at bedtime    MEDICATIONS  (PRN):  acetaminophen     Tablet .. 650 milliGRAM(s) Oral every 6 hours PRN Mild Pain (1 - 3)  benzocaine/menthol Lozenge 1 Lozenge Oral every 4 hours PRN Sore Throat  dextrose Oral Gel 15 Gram(s) Oral once PRN Blood Glucose LESS THAN 70 milliGRAM(s)/deciliter  magnesium hydroxide Suspension 30 milliLiter(s) Oral daily PRN Constipation  oxyCODONE    IR 5 milliGRAM(s) Oral every 6 hours PRN Moderate Pain (4 - 6)  polyethylene glycol 3350 17 Gram(s) Oral daily PRN Constipation       PROBLEM LIST/ ASSESSMENT:  HEALTH ISSUES - PROBLEM Dx:      ,   Patient is a 69y old  Male who presents for medical optimization prior to TAVR (10 Sulaiman 2025 11:49)     s/p TF-TAVR      My plan includes :    Maintain TVP in situ x 24 hrs  Beta blockers for PVCs ( per EP)  F/u 12 lead EKG  supplemental O2 as needed  Titrate to maintain Sao2 >95%  close hemodynamic, ventilatory and drain monitoring and management per post op routine    Monitor for arrhythmias and monitor parameters for organ perfusion  monitor neurologic status  Head of the bed should remain elevated to 45 deg .   chest PT and IS will be encouraged  monitor adequacy of oxygenation and ventilation and attempt to wean oxygen  Nutritional goals will be met using po eventually , ensure adequate caloric intake and montior the same  Stress ulcer and VTE prophylaxis will be achieved    Glycemic control is satisfactory  Electrolytes have been repleted as necessary and wound care has been carried out. Pain control has been achieved.   agressive physical therapy and early mobility and ambulation goals will be met   The family was updated about the course and plan  CRITICAL CARE TIME SPENT in evaluation and management, reassessments, review and interpretation of labs and x-rays, ventilator and hemodynamic management, formulating a plan and coordinating care: ___30__ MIN.  Time does not include procedural time.  CTICU ATTENDING     					    Nacho Bonner MD                        	
Patient discussed on morning rounds with Dr. Arnold    OPERATION & DATE: preop TAVR, 1/6 tooth extraction w/ OMFS    SUBJECTIVE ASSESSMENT:  69yoM seen and examined. Patient feels well, states his mouth soreness is much better than yesterday. No complaints. Tolerating PO diet, satting well on room air, ambulating independently. Denies lightheadedness, headache, CP, palpitations, SOB, abdominal pain, n/v/f/c.    VITAL SIGNS:  Vital Signs Last 24 Hrs  T(C): 36.3 (07 Jan 2025 12:17), Max: 36.8 (07 Jan 2025 01:15)  T(F): 97.3 (07 Jan 2025 12:17), Max: 98.2 (07 Jan 2025 01:15)  HR: 71 (07 Jan 2025 11:19) (71 - 99)  BP: 94/52 (07 Jan 2025 11:19) (83/42 - 123/64)  BP(mean): 69 (07 Jan 2025 11:19) (59 - 88)  RR: 15 (07 Jan 2025 11:19) (15 - 25)  SpO2: 98% (07 Jan 2025 11:19) (95% - 98%)    Parameters below as of 07 Jan 2025 11:19  Patient On (Oxygen Delivery Method): room air      I&O's Detail    06 Jan 2025 07:01  -  07 Jan 2025 07:00  --------------------------------------------------------  IN:    Oral Fluid: 250 mL  Total IN: 250 mL    OUT:    Voided (mL): 2245 mL  Total OUT: 2245 mL    Total NET: -1995 mL      07 Jan 2025 07:01  -  07 Jan 2025 14:31  --------------------------------------------------------  IN:    Heparin: 90 mL    Oral Fluid: 500 mL  Total IN: 590 mL    OUT:  Total OUT: 0 mL    Total NET: 590 mL      PHYSICAL EXAM:  General: NAD, sitting comfortably in chair, conversing appropriately  Neurological: alert and oriented, UE and LE strength equal b/l, facial symmetry present  Cardiovascular: RRR, Clear S1 and S2, +systolic murmur  Respiratory: chest expansion symmetrical, CTA b/l, no wheezing noted  Gastrointestinal: +BS, soft, NT, ND  Extremities: moving spontaneously, no calf tenderness, LLE > RLE  Vascular: warm, well perfused.   Incisions: none    LABS:                        11.6   6.23  )-----------( 177      ( 07 Jan 2025 05:30 )             36.4     PT/INR - ( 07 Jan 2025 05:30 )   PT: 12.1 sec;   INR: 1.05          PTT - ( 07 Jan 2025 05:30 )  PTT:31.5 sec  01-07    135  |  98  |  39[H]  ----------------------------<  100[H]  5.2   |  31  |  1.94[H]    Ca    9.7      07 Jan 2025 05:30  Phos  4.6     01-07  Mg     2.0     01-07      Urinalysis Basic - ( 07 Jan 2025 05:30 )    Color: x / Appearance: x / SG: x / pH: x  Gluc: 100 mg/dL / Ketone: x  / Bili: x / Urobili: x   Blood: x / Protein: x / Nitrite: x   Leuk Esterase: x / RBC: x / WBC x   Sq Epi: x / Non Sq Epi: x / Bacteria: x      MEDICATIONS  (STANDING):  aspirin enteric coated 81 milliGRAM(s) Oral daily  atorvastatin 10 milliGRAM(s) Oral at bedtime  Breztri Aerosphere 160mcg/9mcg/4.8 mcg 160 MICROGram(s) 1 Puff(s) Inhalation two times a day  Breztri Aerosphere Inhaler 1 Puff(s) 1 Puff(s) Inhalation two times a day  fenofibrate Tablet 145 milliGRAM(s) Oral daily  furosemide    Tablet 20 milliGRAM(s) Oral daily  heparin  Infusion 1500 Unit(s)/Hr (15 mL/Hr) IV Continuous <Continuous>  macitentan 10 milliGRAM(s) Oral daily  metoprolol succinate ER 12.5 milliGRAM(s) Oral every 12 hours  montelukast 10 milliGRAM(s) Oral daily  pantoprazole    Tablet 40 milliGRAM(s) Oral before breakfast  potassium chloride    Tablet ER 10 milliEquivalent(s) Oral daily  senna 2 Tablet(s) Oral at bedtime  sodium chloride 0.9% lock flush 3 milliLiter(s) IV Push every 8 hours  tamsulosin 0.4 milliGRAM(s) Oral at bedtime    MEDICATIONS  (PRN):  acetaminophen     Tablet .. 650 milliGRAM(s) Oral every 6 hours PRN Mild Pain (1 - 3)  oxyCODONE    IR 5 milliGRAM(s) Oral every 6 hours PRN Moderate Pain (4 - 6)  tranexamic acid Injectable for Topical Use 5 milliLiter(s) Topical once PRN tooth extraction bleeding    RADIOLOGY & ADDITIONAL TESTS:    < from: Xray Chest 1 View- PORTABLE-Routine (Xray Chest 1 View- PORTABLE-Routine in AM.) (01.07.25 @ 06:35) >  IMPRESSION:    Increased lung markings with no focal infiltrate or consolidation. Postop   changes. Cannot exclude small pleural effusions. Mild hypoinflation. No   pneumothorax.    < end of copied text >    
Patient discussed on morning rounds with Dr. Gorman    OPERATION & DATE: 1/9 s/p TAVR    SUBJECTIVE ASSESSMENT:    Patient reports to be feeling well today, no acute complaints. Denies sob, chest pain, headaches, N/V, weakness, or numbness.     VITAL SIGNS:  Vital Signs Last 24 Hrs  T(C): 36.1 (12 Jan 2025 05:42), Max: 37.1 (11 Jan 2025 17:24)  T(F): 97 (12 Jan 2025 05:42), Max: 98.8 (11 Jan 2025 17:24)  HR: 95 (12 Jan 2025 09:20) (71 - 95)  BP: 108/53 (12 Jan 2025 09:20) (85/50 - 135/61)  BP(mean): 76 (12 Jan 2025 09:20) (62 - 84)  RR: 18 (12 Jan 2025 09:20) (16 - 20)  SpO2: 92% (12 Jan 2025 08:45) (92% - 97%)    Parameters below as of 12 Jan 2025 08:45  Patient On (Oxygen Delivery Method): room air      I&O's Detail    11 Jan 2025 07:01  -  12 Jan 2025 07:00  --------------------------------------------------------  IN:  Total IN: 0 mL    OUT:    Voided (mL): 2200 mL  Total OUT: 2200 mL    Total NET: -2200 mL      PHYSICAL EXAM:  General: Sitting in bed comfortably in NAD  Neuro: A&Ox3, no focal deficits   HEENT: NCAT, EOMI   Cardiac: Regular rate and rhythm, normal S1 and S2. No m/r/g   Pulm: Breathing comfortably on room air. No signs of respiratory distress. Lungs are CTA b/l without wheezes, rales, or rhonchi   Abdomen: Soft, non-distended, non-tender. + bowel sounds   Extremities: Warm and well perfused, no peripheral edema, distal pulses 2+. No calf tenderness. SCDs and TEDs in place  MSK: Full AROM   Wound: Groin incision is well healing. Redness and irritation around groin, not over groin sites.     LABS:                        10.6   5.67  )-----------( 109      ( 12 Jan 2025 10:00 )             33.0     PT/INR - ( 12 Jan 2025 10:00 )   PT: 15.6 sec;   INR: 1.36          PTT - ( 12 Jan 2025 10:00 )  PTT:31.9 sec  01-11    141  |  98  |  31[H]  ----------------------------<  109[H]  4.5   |  32[H]  |  1.54[H]    Ca    9.6      11 Jan 2025 14:38  Phos  3.1     01-11  Mg     2.3     01-11    TPro  7.8  /  Alb  4.1  /  TBili  0.4  /  DBili  x   /  AST  31  /  ALT  12  /  AlkPhos  50  01-11    Urinalysis Basic - ( 11 Jan 2025 14:38 )    Color: x / Appearance: x / SG: x / pH: x  Gluc: 109 mg/dL / Ketone: x  / Bili: x / Urobili: x   Blood: x / Protein: x / Nitrite: x   Leuk Esterase: x / RBC: x / WBC x   Sq Epi: x / Non Sq Epi: x / Bacteria: x      MEDICATIONS  (STANDING):  albumin human  5% IVPB 250 milliLiter(s) IV Intermittent once  apixaban 5 milliGRAM(s) Oral every 12 hours  atorvastatin 40 milliGRAM(s) Oral at bedtime  Breztri Aerosphere 160mcg/9mcg/4.8 mcg 160 MICROGram(s) 1 Puff(s) Inhalation two times a day  Breztri Aerosphere Inhaler 1 Puff(s) 1 Puff(s) Inhalation two times a day  chlorhexidine 2% Cloths 1 Application(s) Topical daily  clopidogrel Tablet 75 milliGRAM(s) Oral daily  fenofibrate Tablet 145 milliGRAM(s) Oral daily  insulin lispro (ADMELOG) corrective regimen sliding scale   SubCutaneous Before meals and at bedtime  macitentan 10 milliGRAM(s) Oral daily  metoprolol tartrate 25 milliGRAM(s) Oral <User Schedule>  montelukast 10 milliGRAM(s) Oral daily  nystatin Powder 1 Application(s) Topical two times a day  pantoprazole    Tablet 40 milliGRAM(s) Oral before breakfast  senna 2 Tablet(s) Oral at bedtime  sodium chloride 0.9% lock flush 3 milliLiter(s) IV Push every 8 hours  tamsulosin 0.4 milliGRAM(s) Oral at bedtime    MEDICATIONS  (PRN):  acetaminophen     Tablet .. 650 milliGRAM(s) Oral every 6 hours PRN Mild Pain (1 - 3)  benzocaine/menthol Lozenge 1 Lozenge Oral every 4 hours PRN Sore Throat  dextrose Oral Gel 15 Gram(s) Oral once PRN Blood Glucose LESS THAN 70 milliGRAM(s)/deciliter  magnesium hydroxide Suspension 30 milliLiter(s) Oral daily PRN Constipation  oxyCODONE    IR 5 milliGRAM(s) Oral every 6 hours PRN Moderate Pain (4 - 6)  polyethylene glycol 3350 17 Gram(s) Oral daily PRN Constipation    RADIOLOGY & ADDITIONAL TESTS:

## 2025-01-13 ENCOUNTER — NON-APPOINTMENT (OUTPATIENT)
Age: 70
End: 2025-01-13

## 2025-01-13 ENCOUNTER — TRANSCRIPTION ENCOUNTER (OUTPATIENT)
Age: 70
End: 2025-01-13

## 2025-01-13 VITALS
DIASTOLIC BLOOD PRESSURE: 63 MMHG | SYSTOLIC BLOOD PRESSURE: 109 MMHG | OXYGEN SATURATION: 95 % | RESPIRATION RATE: 17 BRPM | HEART RATE: 91 BPM

## 2025-01-13 LAB
ANION GAP SERPL CALC-SCNC: 9 MMOL/L — SIGNIFICANT CHANGE UP (ref 5–17)
BUN SERPL-MCNC: 35 MG/DL — HIGH (ref 7–23)
CALCIUM SERPL-MCNC: 9.2 MG/DL — SIGNIFICANT CHANGE UP (ref 8.4–10.5)
CHLORIDE SERPL-SCNC: 97 MMOL/L — SIGNIFICANT CHANGE UP (ref 96–108)
CO2 SERPL-SCNC: 32 MMOL/L — HIGH (ref 22–31)
CREAT SERPL-MCNC: 1.57 MG/DL — HIGH (ref 0.5–1.3)
EGFR: 48 ML/MIN/1.73M2 — LOW
GLUCOSE SERPL-MCNC: 101 MG/DL — HIGH (ref 70–99)
HCT VFR BLD CALC: 34 % — LOW (ref 39–50)
HGB BLD-MCNC: 10.7 G/DL — LOW (ref 13–17)
MAGNESIUM SERPL-MCNC: 2 MG/DL — SIGNIFICANT CHANGE UP (ref 1.6–2.6)
MCHC RBC-ENTMCNC: 29.6 PG — SIGNIFICANT CHANGE UP (ref 27–34)
MCHC RBC-ENTMCNC: 31.5 G/DL — LOW (ref 32–36)
MCV RBC AUTO: 93.9 FL — SIGNIFICANT CHANGE UP (ref 80–100)
NRBC # BLD: 0 /100 WBCS — SIGNIFICANT CHANGE UP (ref 0–0)
PLATELET # BLD AUTO: 141 K/UL — LOW (ref 150–400)
POTASSIUM SERPL-MCNC: 4.6 MMOL/L — SIGNIFICANT CHANGE UP (ref 3.5–5.3)
POTASSIUM SERPL-SCNC: 4.6 MMOL/L — SIGNIFICANT CHANGE UP (ref 3.5–5.3)
RBC # BLD: 3.62 M/UL — LOW (ref 4.2–5.8)
RBC # FLD: 13.8 % — SIGNIFICANT CHANGE UP (ref 10.3–14.5)
SODIUM SERPL-SCNC: 138 MMOL/L — SIGNIFICANT CHANGE UP (ref 135–145)
WBC # BLD: 6.37 K/UL — SIGNIFICANT CHANGE UP (ref 3.8–10.5)
WBC # FLD AUTO: 6.37 K/UL — SIGNIFICANT CHANGE UP (ref 3.8–10.5)

## 2025-01-13 PROCEDURE — C1889: CPT

## 2025-01-13 PROCEDURE — C1760: CPT

## 2025-01-13 PROCEDURE — 84443 ASSAY THYROID STIM HORMONE: CPT

## 2025-01-13 PROCEDURE — 93308 TTE F-UP OR LMTD: CPT

## 2025-01-13 PROCEDURE — C1751: CPT

## 2025-01-13 PROCEDURE — 86901 BLOOD TYPING SEROLOGIC RH(D): CPT

## 2025-01-13 PROCEDURE — C8929: CPT

## 2025-01-13 PROCEDURE — 93005 ELECTROCARDIOGRAM TRACING: CPT

## 2025-01-13 PROCEDURE — C1887: CPT

## 2025-01-13 PROCEDURE — 85027 COMPLETE CBC AUTOMATED: CPT

## 2025-01-13 PROCEDURE — L8699: CPT

## 2025-01-13 PROCEDURE — 81001 URINALYSIS AUTO W/SCOPE: CPT

## 2025-01-13 PROCEDURE — C1884: CPT

## 2025-01-13 PROCEDURE — 85025 COMPLETE CBC W/AUTO DIFF WBC: CPT

## 2025-01-13 PROCEDURE — 82962 GLUCOSE BLOOD TEST: CPT

## 2025-01-13 PROCEDURE — 86850 RBC ANTIBODY SCREEN: CPT

## 2025-01-13 PROCEDURE — 36415 COLL VENOUS BLD VENIPUNCTURE: CPT

## 2025-01-13 PROCEDURE — 83605 ASSAY OF LACTIC ACID: CPT

## 2025-01-13 PROCEDURE — 86900 BLOOD TYPING SEROLOGIC ABO: CPT

## 2025-01-13 PROCEDURE — 71045 X-RAY EXAM CHEST 1 VIEW: CPT

## 2025-01-13 PROCEDURE — C1725: CPT

## 2025-01-13 PROCEDURE — 82947 ASSAY GLUCOSE BLOOD QUANT: CPT

## 2025-01-13 PROCEDURE — 80061 LIPID PANEL: CPT

## 2025-01-13 PROCEDURE — 80053 COMPREHEN METABOLIC PANEL: CPT

## 2025-01-13 PROCEDURE — 84132 ASSAY OF SERUM POTASSIUM: CPT

## 2025-01-13 PROCEDURE — 94640 AIRWAY INHALATION TREATMENT: CPT

## 2025-01-13 PROCEDURE — 83735 ASSAY OF MAGNESIUM: CPT

## 2025-01-13 PROCEDURE — 83036 HEMOGLOBIN GLYCOSYLATED A1C: CPT

## 2025-01-13 PROCEDURE — 84100 ASSAY OF PHOSPHORUS: CPT

## 2025-01-13 PROCEDURE — 85730 THROMBOPLASTIN TIME PARTIAL: CPT

## 2025-01-13 PROCEDURE — 83880 ASSAY OF NATRIURETIC PEPTIDE: CPT

## 2025-01-13 PROCEDURE — 82803 BLOOD GASES ANY COMBINATION: CPT

## 2025-01-13 PROCEDURE — C1894: CPT

## 2025-01-13 PROCEDURE — 80048 BASIC METABOLIC PNL TOTAL CA: CPT

## 2025-01-13 PROCEDURE — C1769: CPT

## 2025-01-13 PROCEDURE — 85610 PROTHROMBIN TIME: CPT

## 2025-01-13 PROCEDURE — 99232 SBSQ HOSP IP/OBS MODERATE 35: CPT

## 2025-01-13 PROCEDURE — 84295 ASSAY OF SERUM SODIUM: CPT

## 2025-01-13 PROCEDURE — 97161 PT EVAL LOW COMPLEX 20 MIN: CPT

## 2025-01-13 PROCEDURE — 71045 X-RAY EXAM CHEST 1 VIEW: CPT | Mod: 26

## 2025-01-13 PROCEDURE — 82330 ASSAY OF CALCIUM: CPT

## 2025-01-13 PROCEDURE — 99239 HOSP IP/OBS DSCHRG MGMT >30: CPT

## 2025-01-13 PROCEDURE — 86923 COMPATIBILITY TEST ELECTRIC: CPT

## 2025-01-13 PROCEDURE — 85014 HEMATOCRIT: CPT

## 2025-01-13 RX ORDER — METOPROLOL TARTRATE 50 MG
1 TABLET ORAL
Qty: 60 | Refills: 0
Start: 2025-01-13 | End: 2025-02-11

## 2025-01-13 RX ORDER — RIVAROXABAN 2.5 MG/1
1 TABLET, FILM COATED ORAL
Refills: 0 | DISCHARGE

## 2025-01-13 RX ORDER — POTASSIUM CHLORIDE 600 MG/1
1 TABLET, FILM COATED, EXTENDED RELEASE ORAL
Qty: 30 | Refills: 0
Start: 2025-01-13 | End: 2025-02-11

## 2025-01-13 RX ORDER — PANTOPRAZOLE 40 MG/1
1 TABLET, DELAYED RELEASE ORAL
Qty: 30 | Refills: 0
Start: 2025-01-13 | End: 2025-02-11

## 2025-01-13 RX ORDER — TICAGRELOR 60 MG/1
1 TABLET ORAL
Refills: 0 | DISCHARGE

## 2025-01-13 RX ORDER — FUROSEMIDE 20 MG
1 TABLET ORAL
Qty: 30 | Refills: 0
Start: 2025-01-13 | End: 2025-02-11

## 2025-01-13 RX ORDER — APIXABAN 5 MG/1
1 TABLET, FILM COATED ORAL
Qty: 60 | Refills: 0
Start: 2025-01-13 | End: 2025-02-11

## 2025-01-13 RX ORDER — LISINOPRIL 30 MG/1
1 TABLET ORAL
Refills: 0 | DISCHARGE

## 2025-01-13 RX ORDER — METOPROLOL TARTRATE 50 MG
0.5 TABLET ORAL
Refills: 0 | DISCHARGE

## 2025-01-13 RX ORDER — ACETAMINOPHEN 80 MG/.8ML
2 SOLUTION/ DROPS ORAL
Qty: 120 | Refills: 0
Start: 2025-01-13 | End: 2025-01-27

## 2025-01-13 RX ORDER — CLOPIDOGREL BISULFATE 75 MG/1
1 TABLET, FILM COATED ORAL
Qty: 30 | Refills: 0
Start: 2025-01-13 | End: 2025-02-11

## 2025-01-13 RX ADMIN — FENOFIBRATE 145 MILLIGRAM(S): 48 TABLET ORAL at 12:39

## 2025-01-13 RX ADMIN — CHLORHEXIDINE GLUCONATE 1 APPLICATION(S): 1.2 RINSE ORAL at 06:16

## 2025-01-13 RX ADMIN — NYSTATIN TOPICAL POWDER 1 APPLICATION(S): 100000 POWDER TOPICAL at 08:04

## 2025-01-13 RX ADMIN — SODIUM CHLORIDE 3 MILLILITER(S): 9 INJECTION, SOLUTION INTRAMUSCULAR; INTRAVENOUS; SUBCUTANEOUS at 00:06

## 2025-01-13 RX ADMIN — Medication 800 MILLIGRAM(S): at 08:04

## 2025-01-13 RX ADMIN — Medication 5 MILLIGRAM(S): at 03:19

## 2025-01-13 RX ADMIN — Medication 5 MILLIGRAM(S): at 02:19

## 2025-01-13 RX ADMIN — PANTOPRAZOLE 40 MILLIGRAM(S): 40 TABLET, DELAYED RELEASE ORAL at 06:17

## 2025-01-13 RX ADMIN — MONTELUKAST SODIUM 10 MILLIGRAM(S): 10 TABLET, FILM COATED ORAL at 12:39

## 2025-01-13 RX ADMIN — APIXABAN 5 MILLIGRAM(S): 5 TABLET, FILM COATED ORAL at 06:16

## 2025-01-13 RX ADMIN — Medication 25 MILLIGRAM(S): at 06:17

## 2025-01-13 RX ADMIN — Medication 40 MILLIGRAM(S): at 06:17

## 2025-01-13 RX ADMIN — CLOPIDOGREL BISULFATE 75 MILLIGRAM(S): 75 TABLET, FILM COATED ORAL at 12:39

## 2025-01-13 NOTE — DISCHARGE NOTE NURSING/CASE MANAGEMENT/SOCIAL WORK - [id number]_GEN_ALL_CORE
For information on Fall & Injury Prevention, visit: [email or web address] OR  [email or web address] OR  https://www.cdc.gov/steadi/patient.html

## 2025-01-13 NOTE — PROGRESS NOTE ADULT - NS ATTEND AMEND GEN_ALL_CORE FT
No change in QRS, plan to follow with outpatient monitoring.
Agree with above. Pt seen and examined at bedside. Volume status improved. Will switch to 40mg daily of PO Lasix. Keep at 25 mg BID of Metoprolol given that BP traditionally around 100 systolic. Pt ambulated well today. EP reccs appreciated. MCOT prior to dc. EP f/u for possible PVC ablation in the future. Anticipated DC tomorrow 1/13.

## 2025-01-13 NOTE — DISCHARGE NOTE NURSING/CASE MANAGEMENT/SOCIAL WORK - PATIENT PORTAL LINK FT
You can access the FollowMyHealth Patient Portal offered by [facility] by registering at the following website: [email or web address]. By joining [employer]’s FollowMyHealth portal, you will also be able to view your health information using other applications (apps) compatible with our system.

## 2025-01-13 NOTE — DISCHARGE NOTE PROVIDER - HOSPITAL COURSE
Patient discussed on morning rounds with Dr. Tubbs  Operation Date: 1/9,TF TAVR (Iraida 29 mm)  Primary Surgeon/Attending MD:   Referring Physician: None  _ _ _ _ _ _ _ _ _ _ _ _   HOSPITAL COURSE:   69M, former smoker (quit 1997), with PMHx of BPH, PE (2013), DVT (2024), STEPHEN, fatty liver, gout, OA, HTN, HLD, T2DM (on Mounjaro), CKD (baseline Cr 1.5), CAD/NSTEMI s/p PCI (RCA, October 2024), AF s/p Ablation (Xarleto), LV thormbus s/p open extration at [facility] (2020), pHTN (on Opsumit), HFpEF, and symptomatic AS who was deemed good candidate for TAVR. On 1/4, he presented to [facility] for pre-op optimization. On 1/6. he underwent tooth extraction with OMFS. On 1/9, he underwent a TF TAVR (29 mm Iraida). He widened intraoperatively. He was brought to the CTICU intubated on no gtts, not actively pacing. He was extubated overnight. On POD 1, he was started on metoprolol to monitor rhtyhm. EP was consulted. On POD 2, his TVP was removed and his metoprolol was increased. On POD 3, he was transferred to the floor and started on PO lasix. On POD 4, he was cleared for discharge home with MCOT per Dr. Tubbs on Eliquis and Plavix. At time of discharge, patient is hemodynamically stable, voiding well, ambulating without difficulty. He will follow-up in 1-2 weeks with Dr. Tubbs  _ _ _ _ _ _ _ _ _ _ _ _   DISCHARGE PHYSICAL EXAM:   General: Sitting in chair comfortably in NAD  Neuro: A&Ox3, no focal deficits   HEENT: NCAT, EOMI   Cardiac: Regular rate and rhythm, normal S1 and S2. No m/r/g   Pulm: Breathing comfortably on RA. No signs of respiratory distress. Lungs are CTA b/l without wheezes, rales, or rhonchi   Abdomen: Soft, non-distended, non-tender. + bowel sounds   : No reyes  Extremities: Warm and well perfused, no peripheral edema, distal pulses 2+. No calf tenderness. SCDs and TEDs in place  MSK: Full AROM   Wound: Groins c/d/i without erythema or drainage.   _ _ _ _   _ _ _ _ _ _ _ _   REMOVAL CHECKLIST:         [ N/a] Epicardial wires         [ N/a] Stitches/tie downs,   If no, why?          [ N/a] PICC/Midline,   If no, why?    _ _ _ _ _ _ _ _ _ _ _ _   MEDICATION DISCHARGE CHECKLIST   TAVR        Anticoagulation plan: [ ] Aspirin, [ YES] Plavix, [ YES] Eliquis, [ ] Other:        Keep one:        Chronic diastolic heart failure treated with TAVR         Anticoagulation         [ YES] NOAC – NOAC, [ ] Reason:               Cost/Insurance barriers addressed: YES/NO     _ _ _ _ _ _ _ _ _ _ _   RELEVANT LABS/IMAGING:   < from: TTE Echo Complete w/ Contrast w/ Doppler (01.09.25 @ 12:31) >    CONCLUSIONS:     1. Normal left ventricular size.   2. Mild symmetric left ventricular hypertrophy.   3. Normal left ventricular systolic function.   4. Normal right ventricular size and systolic function.   5. Dilated left atrium.   6. TAVR valve is seen in the aortic position with apparent normal   function, without evidenceof prosthetic dysfunction.   7. No evidence of pulmonary hypertension.   8. No pericardial effusion.   9. Compared to the previous TTE performed on 12/2/2024, pt is s/p TAVR.    < end of copied text >  _  _ _ _ _ _ _ _ _ _ _   CLINICAL FOLLOW UP NEEDS:      [ ] Lab work needed: NO     [ ] Imaging needed: NO     [ ] Home equipment MCOT  _ _ _ _ _ _ _ _ _ _ _ _   Over 35 minutes was spent with the patient reviewing the discharge material including medications, follow up appointments, recovery, concerning symptoms, and how to contact their health care providers if they have questions.

## 2025-01-13 NOTE — DISCHARGE NOTE PROVIDER - NSDCMRMEDTOKEN_GEN_ALL_CORE_FT
acetaminophen 325 mg oral tablet: 2 tab(s) orally every 6 hours as needed for Mild Pain (1 - 3)  atorvastatin 10 mg oral tablet: 1 tab(s) orally once a day  Breztri Aerosphere 160 mcg-9 mcg-4.8 mcg/inh inhalation aerosol: 2 puff(s) inhaled once a day  clopidogrel 75 mg oral tablet: 1 tab(s) orally once a day  Eliquis 5 mg oral tablet: 1 tab(s) orally every 12 hours  fenofibrate 145 mg oral tablet: 1 tab(s) orally once a day  furosemide 40 mg oral tablet: 1 tab(s) orally once a day  metoprolol tartrate 25 mg oral tablet: 1 tab(s) orally every 12 hours  montelukast 10 mg oral tablet: 1 tab(s) orally once a day  Mounjaro 5 mg/0.5 mL subcutaneous solution: 5 milligram(s) subcutaneously once a week  Opsumit 10 mg oral tablet: 1 tab(s) orally once a day  pantoprazole 40 mg oral delayed release tablet: 1 tab(s) orally once a day (before a meal)  Potassium Chloride (Eqv-K-Tab) 10 mEq oral tablet, extended release: 1 tab(s) orally once a day  tamsulosin 0.4 mg oral capsule: 1 cap(s) orally once a day  Vascepa 1 g oral capsule: 2 cap(s) orally every 12 hours

## 2025-01-13 NOTE — PROGRESS NOTE ADULT - REASON FOR ADMISSION
medical optimization prior to TAVR

## 2025-01-13 NOTE — DISCHARGE NOTE PROVIDER - CARE PROVIDER_API CALL
Cristobal Tubbs  Interventional Cardiology  [address]  [place] [address]  Phone: [phone]  Fax: [phone]  Follow Up Time: 1 week    Wallace Fuentes  Interventional Cardiology  [address] [place] [id number]  Phone: [phone]  Fax: [phone]  Follow Up Time: 2 weeks

## 2025-01-13 NOTE — DISCHARGE NOTE NURSING/CASE MANAGEMENT/SOCIAL WORK - FINANCIAL ASSISTANCE
[facility] provides services at a reduced cost to those who are determined to be eligible through [facility]’s financial assistance program. Information regarding [facility]’s financial assistance program can be found by going to [email or web address] or by calling [phone].

## 2025-01-13 NOTE — DISCHARGE NOTE PROVIDER - NSDCFUADDINST_GEN_ALL_CORE_FT
- If you had a valve replacement/repair or any aortic surgery it is recommended that you take antibiotics before any dental procedures going forward. Please call our office with assistance if you have anything scheduled    -Walk daily as tolerated and use your incentive spirometer 10 times every hour while you are awake.     -Please weigh yourself daily. If you notice over a 3 pound weight gain in 3 days, this is a sign you are likely retaining too much fluid. It is imperative you call our right away with unexplained rapid weight gain.      -Please continue to wear the compression stockings given to you in the hospital at home. This is a way to prevent fluid from building up in your legs.     -No driving or strenuous activity/exercise until cleared by your surgeon.    -Gently clean your incisions with unscented/antibacterial soap and water, pat dry.  You may leave them open to air.    -Call your doctor if you have shortness of breath, chest pain not relieved by pain medication, dizziness, fever >101.5, or increased redness or drainage from incisions.

## 2025-01-13 NOTE — DISCHARGE NOTE PROVIDER - NSDCFUADDAPPT_GEN_ALL_CORE_FT
The office will call you with the timing of your discharge appointments. Please call them at [phone] if you do not hear from them by end of day tomorrow.     You had a MCOT monitor (an external cardiac rhythm monitoring device) placed on your day of discharge.  This helps us monitor your heart while you are out of the hospital for 30 days after discharge. Should your heart go into an abnormal or dangerous rhythm you will receieve a call from the MCOT team and your Structural Heart team of Doctors and PA's will be notified.    1. Keep the monitor within 30 feet of you at all times.  2. When you feel any symptom (chest pain, dizziness, palpitations, weakness, fatigue or anything outside of your normal), press the “Record Symptoms” button on the main phone of your phone  3. Shower or exercise as normal whilewearing the MCOT Patch. Do not swim or take a bath. Patch is water-resistant, not waterproof  4. When the battery is low on the phone or on the device, use the supplied . The monitor will show a warning message when the battery is low.  5. Do not remove the patch from yourskin after you begin monitoring. With normal wear, each patch should last 5 days. To replace the patch follow instructions in the MCOT box with the Patch Guide  6. Any issues with the MCOT device or phone please call Customer Service at [phone].  7. If you have any other questions at all please call the Structural Heart office at [phone]

## 2025-01-13 NOTE — DISCHARGE NOTE PROVIDER - CARE PROVIDERS DIRECT ADDRESSES
[email or web address],DirectAddress_Unknown

## 2025-01-13 NOTE — DISCHARGE NOTE PROVIDER - PROVIDER TOKENS
PROVIDER:[TOKEN:[[id number]],FOLLOWUP:[1 week]],PROVIDER:[TOKEN:[[id number]],FOLLOWUP:[2 weeks]]

## 2025-01-13 NOTE — PROGRESS NOTE ADULT - PROVIDER SPECIALTY LIST ADULT
Critical Care
Critical Care
Electrophysiology
Structural Heart
Critical Care
Structural Heart
Electrophysiology
OMFS
Structural Heart

## 2025-01-14 ENCOUNTER — NON-APPOINTMENT (OUTPATIENT)
Age: 70
End: 2025-01-14

## 2025-01-14 DIAGNOSIS — Z95.2 PRESENCE OF PROSTHETIC HEART VALVE: ICD-10-CM

## 2025-01-14 PROBLEM — I82.409 ACUTE EMBOLISM AND THROMBOSIS OF UNSPECIFIED DEEP VEINS OF UNSPECIFIED LOWER EXTREMITY: Chronic | Status: ACTIVE | Noted: 2025-01-04

## 2025-01-14 PROBLEM — I35.0 NONRHEUMATIC AORTIC (VALVE) STENOSIS: Chronic | Status: ACTIVE | Noted: 2025-01-04

## 2025-01-14 PROBLEM — G47.33 OBSTRUCTIVE SLEEP APNEA (ADULT) (PEDIATRIC): Chronic | Status: ACTIVE | Noted: 2025-01-04

## 2025-01-14 PROBLEM — N40.0 BENIGN PROSTATIC HYPERPLASIA WITHOUT LOWER URINARY TRACT SYMPTOMS: Chronic | Status: ACTIVE | Noted: 2025-01-04

## 2025-01-14 PROBLEM — I26.99 OTHER PULMONARY EMBOLISM WITHOUT ACUTE COR PULMONALE: Chronic | Status: ACTIVE | Noted: 2025-01-04

## 2025-01-14 PROBLEM — I48.91 UNSPECIFIED ATRIAL FIBRILLATION: Chronic | Status: ACTIVE | Noted: 2025-01-04

## 2025-01-14 PROBLEM — I50.9 HEART FAILURE, UNSPECIFIED: Chronic | Status: ACTIVE | Noted: 2025-01-04

## 2025-01-14 PROBLEM — I25.10 ATHEROSCLEROTIC HEART DISEASE OF NATIVE CORONARY ARTERY WITHOUT ANGINA PECTORIS: Chronic | Status: ACTIVE | Noted: 2025-01-04

## 2025-01-14 PROBLEM — I51.3 INTRACARDIAC THROMBOSIS, NOT ELSEWHERE CLASSIFIED: Chronic | Status: ACTIVE | Noted: 2025-01-04

## 2025-01-14 PROBLEM — I27.20 PULMONARY HYPERTENSION, UNSPECIFIED: Chronic | Status: ACTIVE | Noted: 2025-01-04

## 2025-01-14 PROBLEM — I10 ESSENTIAL (PRIMARY) HYPERTENSION: Chronic | Status: ACTIVE | Noted: 2025-01-04

## 2025-01-14 PROBLEM — E11.9 TYPE 2 DIABETES MELLITUS WITHOUT COMPLICATIONS: Chronic | Status: ACTIVE | Noted: 2025-01-04

## 2025-01-14 PROBLEM — N18.9 CHRONIC KIDNEY DISEASE, UNSPECIFIED: Chronic | Status: ACTIVE | Noted: 2025-01-04

## 2025-01-14 RX ORDER — TAMSULOSIN HYDROCHLORIDE 0.4 MG/1
0.4 CAPSULE ORAL
Qty: 90 | Refills: 0 | Status: ACTIVE | COMMUNITY
Start: 2025-01-14 | End: 1900-01-01

## 2025-01-14 RX ORDER — APIXABAN 5 MG/1
5 TABLET, FILM COATED ORAL
Qty: 60 | Refills: 0 | Status: ACTIVE | COMMUNITY

## 2025-01-14 RX ORDER — ICOSAPENT ETHYL 1 G/1
1 CAPSULE ORAL
Qty: 360 | Refills: 0 | Status: ACTIVE | COMMUNITY
Start: 2025-01-14 | End: 1900-01-01

## 2025-01-14 RX ORDER — PANTOPRAZOLE SODIUM 40 MG/1
40 TABLET, DELAYED RELEASE ORAL
Qty: 30 | Refills: 0 | Status: ACTIVE | COMMUNITY

## 2025-01-14 RX ORDER — CLOPIDOGREL BISULFATE 75 MG/1
75 TABLET, FILM COATED ORAL DAILY
Qty: 90 | Refills: 0 | Status: ACTIVE | COMMUNITY

## 2025-01-14 RX ORDER — METOPROLOL TARTRATE 25 MG/1
25 TABLET ORAL
Qty: 60 | Refills: 0 | Status: ACTIVE | COMMUNITY

## 2025-01-14 RX ORDER — FUROSEMIDE 40 MG/1
40 TABLET ORAL
Qty: 30 | Refills: 0 | Status: ACTIVE | COMMUNITY

## 2025-01-14 RX ORDER — FENOFIBRATE 145 MG/1
145 TABLET, COATED ORAL DAILY
Qty: 90 | Refills: 0 | Status: ACTIVE | COMMUNITY
Start: 2025-01-14 | End: 1900-01-01

## 2025-01-14 RX ORDER — MONTELUKAST 10 MG/1
10 TABLET, FILM COATED ORAL
Qty: 90 | Refills: 0 | Status: ACTIVE | COMMUNITY
Start: 2025-01-14 | End: 1900-01-01

## 2025-01-14 RX ORDER — SENNOSIDES 8.6 MG TABLETS 8.6 MG/1
8.6 TABLET ORAL
Qty: 60 | Refills: 0 | Status: ACTIVE | COMMUNITY
Start: 2025-01-14 | End: 1900-01-01

## 2025-01-14 RX ORDER — POTASSIUM CHLORIDE 750 MG/1
10 TABLET, FILM COATED, EXTENDED RELEASE ORAL
Qty: 30 | Refills: 0 | Status: ACTIVE | COMMUNITY

## 2025-01-16 RX ORDER — ATORVASTATIN CALCIUM 40 MG/1
1 TABLET, FILM COATED ORAL
Qty: 30 | Refills: 0
Start: 2025-01-16 | End: 2025-02-14

## 2025-01-17 ENCOUNTER — APPOINTMENT (OUTPATIENT)
Dept: CARE COORDINATION | Facility: HOME HEALTH | Age: 70
End: 2025-01-17

## 2025-01-17 DIAGNOSIS — R21 RASH AND OTHER NONSPECIFIC SKIN ERUPTION: ICD-10-CM

## 2025-01-17 RX ORDER — NYSTATIN 100000 [USP'U]/G
100000 POWDER TOPICAL
Qty: 1 | Refills: 0 | Status: ACTIVE | COMMUNITY
Start: 2025-01-17 | End: 1900-01-01

## 2025-01-17 RX ORDER — MACITENTAN 10 MG/1
1 TABLET, FILM COATED ORAL
Qty: 0 | Refills: 0 | DISCHARGE

## 2025-01-17 NOTE — CHART NOTE - OTHER
Remote Cibinqo Counseling: I discussed with the patient the risks of Cibinqo therapy including but not limited to common cold, nausea, headache, cold sores, increased blood CPK levels, dizziness, UTIs, fatigue, acne, and vomitting. Live vaccines should be avoided.  This medication has been linked to serious infections; higher rate of mortality; malignancy and lymphoproliferative disorders; major adverse cardiovascular events; thrombosis; thrombocytopenia and lymphopenia; lipid elevations; and retinal detachment.

## 2025-01-17 NOTE — CHART NOTE - NSCHARTNOTEFT_GEN_A_CORE
Post-Discharge Medication Review: Completed	  	  Patient's preferred pharmacy was updated in OMR: [facility] [place], [place] 	  	  Patient contacted to offer medication counseling post-discharge. Medication reconciliation completed. Per patient, medications include:	  	  1.	acetaminophen 325 mg oral tablet 2 tab(s) orally every 6 hours as needed for Mild Pain (1 - 3)  2.	Breztri Aerosphere 160 mcg-9 mcg-4.8 mcg/inh inhalation aerosol 2 puff(s) inhaled once a day  3.	clopidogrel 75 mg oral tablet 1 tab(s) orally once a day  4.	Eliquis 5 mg oral tablet 1 tab(s) orally every 12 hours  5.	fenofibrate 145 mg oral tablet 1 tab(s) orally once a day  6.	furosemide 40 mg oral tablet 1 tab(s) orally once a day  7.	metoprolol tartrate 25 mg oral tablet 1 tab(s) orally every 12 hours  8.	montelukast 10 mg oral tablet 1 tab(s) orally once a day  9.	Mounjaro 5 mg/0.5 mL subcutaneous solution 5 milligram(s) subcutaneously once a week  10.	Opsumit 10 mg oral tablet 1 tab(s) orally once a day  11.	pantoprazole 40 mg oral delayed release tablet 1 tab(s) orally once a day (before a meal)  12.	Potassium Chloride (Eqv-K-Tab) 10 mEq oral tablet, extended release 1 tab(s) orally once a day  13.	tamsulosin 0.4 mg oral capsule 1 cap(s) orally once a day  14.	Vascepa 1 g oral capsule 2 cap(s) orally every 12 hours   	  Medications added to OMR (updated per discussion with patient):	  15.	 atorvastatin 40 mg oral tablet 1 tab(s) orally once a day    	  Medications removed from OMR (updated per discussion with patient):	  1.	 atorvastatin 10 mg oral tablet 1 tab(s) orally once a day    	  Medication name, indication, administration, side effect, and monitoring reviewed for new medications during post discharge counseling visit with patient. Patient demonstrated understanding. Counseling offered for all medications.	  	  While inpatient, patient received atorvastatin 40 mg. However, discharge document recommended for patient to resume atorvastatin 10 mg. Spoke to inpatient team and agreed patient should be on atorvastatin 40 mg daily. New prescription was sent to patient's pharmacy. Patient is in the process of scheduling follow up appointments with providers. 	  	  Nayely HuangD	  Clinical Pharmacy Specialist, Pharmacy Telehealth Team	  Can be reached via MS Teams or [phone]

## 2025-01-21 DIAGNOSIS — I25.10 ATHEROSCLEROTIC HEART DISEASE OF NATIVE CORONARY ARTERY WITHOUT ANGINA PECTORIS: ICD-10-CM

## 2025-01-21 DIAGNOSIS — M10.9 GOUT, UNSPECIFIED: ICD-10-CM

## 2025-01-21 DIAGNOSIS — Z00.6 ENCOUNTER FOR EXAMINATION FOR NORMAL COMPARISON AND CONTROL IN CLINICAL RESEARCH PROGRAM: ICD-10-CM

## 2025-01-21 DIAGNOSIS — I35.0 NONRHEUMATIC AORTIC (VALVE) STENOSIS: ICD-10-CM

## 2025-01-21 DIAGNOSIS — M19.90 UNSPECIFIED OSTEOARTHRITIS, UNSPECIFIED SITE: ICD-10-CM

## 2025-01-21 DIAGNOSIS — Z79.01 LONG TERM (CURRENT) USE OF ANTICOAGULANTS: ICD-10-CM

## 2025-01-21 DIAGNOSIS — I49.3 VENTRICULAR PREMATURE DEPOLARIZATION: ICD-10-CM

## 2025-01-21 DIAGNOSIS — I48.91 UNSPECIFIED ATRIAL FIBRILLATION: ICD-10-CM

## 2025-01-21 DIAGNOSIS — Z86.718 PERSONAL HISTORY OF OTHER VENOUS THROMBOSIS AND EMBOLISM: ICD-10-CM

## 2025-01-21 DIAGNOSIS — Z95.5 PRESENCE OF CORONARY ANGIOPLASTY IMPLANT AND GRAFT: ICD-10-CM

## 2025-01-21 DIAGNOSIS — I97.190 OTHER POSTPROCEDURAL CARDIAC FUNCTIONAL DISTURBANCES FOLLOWING CARDIAC SURGERY: ICD-10-CM

## 2025-01-21 DIAGNOSIS — I50.32 CHRONIC DIASTOLIC (CONGESTIVE) HEART FAILURE: ICD-10-CM

## 2025-01-21 DIAGNOSIS — G47.33 OBSTRUCTIVE SLEEP APNEA (ADULT) (PEDIATRIC): ICD-10-CM

## 2025-01-21 DIAGNOSIS — E78.5 HYPERLIPIDEMIA, UNSPECIFIED: ICD-10-CM

## 2025-01-21 DIAGNOSIS — J44.9 CHRONIC OBSTRUCTIVE PULMONARY DISEASE, UNSPECIFIED: ICD-10-CM

## 2025-01-21 DIAGNOSIS — Z86.711 PERSONAL HISTORY OF PULMONARY EMBOLISM: ICD-10-CM

## 2025-01-21 DIAGNOSIS — E11.22 TYPE 2 DIABETES MELLITUS WITH DIABETIC CHRONIC KIDNEY DISEASE: ICD-10-CM

## 2025-01-21 DIAGNOSIS — Z87.891 PERSONAL HISTORY OF NICOTINE DEPENDENCE: ICD-10-CM

## 2025-01-21 DIAGNOSIS — K02.9 DENTAL CARIES, UNSPECIFIED: ICD-10-CM

## 2025-01-21 DIAGNOSIS — N18.30 CHRONIC KIDNEY DISEASE, STAGE 3 UNSPECIFIED: ICD-10-CM

## 2025-01-21 DIAGNOSIS — K00.0 ANODONTIA: ICD-10-CM

## 2025-01-21 DIAGNOSIS — Z79.85 LONG-TERM (CURRENT) USE OF INJECTABLE NON-INSULIN ANTIDIABETIC DRUGS: ICD-10-CM

## 2025-01-21 DIAGNOSIS — G47.00 INSOMNIA, UNSPECIFIED: ICD-10-CM

## 2025-01-21 DIAGNOSIS — R00.1 BRADYCARDIA, UNSPECIFIED: ICD-10-CM

## 2025-01-21 DIAGNOSIS — I27.20 PULMONARY HYPERTENSION, UNSPECIFIED: ICD-10-CM

## 2025-01-21 DIAGNOSIS — N40.0 BENIGN PROSTATIC HYPERPLASIA WITHOUT LOWER URINARY TRACT SYMPTOMS: ICD-10-CM

## 2025-01-21 DIAGNOSIS — K76.0 FATTY (CHANGE OF) LIVER, NOT ELSEWHERE CLASSIFIED: ICD-10-CM

## 2025-01-21 DIAGNOSIS — I44.7 LEFT BUNDLE-BRANCH BLOCK, UNSPECIFIED: ICD-10-CM

## 2025-01-24 ENCOUNTER — APPOINTMENT (OUTPATIENT)
Dept: CARDIOTHORACIC SURGERY | Facility: CLINIC | Age: 70
End: 2025-01-24
Payer: MEDICARE

## 2025-01-24 PROCEDURE — 99213 OFFICE O/P EST LOW 20 MIN: CPT | Mod: 2W

## 2025-02-03 ENCOUNTER — NON-APPOINTMENT (OUTPATIENT)
Age: 70
End: 2025-02-03

## 2025-03-03 ENCOUNTER — APPOINTMENT (OUTPATIENT)
Dept: CARDIOTHORACIC SURGERY | Facility: CLINIC | Age: 70
End: 2025-03-03
Payer: MEDICARE

## 2025-03-03 ENCOUNTER — OUTPATIENT (OUTPATIENT)
Dept: OUTPATIENT SERVICES | Facility: HOSPITAL | Age: 70
LOS: 1 days | End: 2025-03-03
Payer: MEDICARE

## 2025-03-03 ENCOUNTER — RESULT REVIEW (OUTPATIENT)
Age: 70
End: 2025-03-03

## 2025-03-03 ENCOUNTER — NON-APPOINTMENT (OUTPATIENT)
Age: 70
End: 2025-03-03

## 2025-03-03 ENCOUNTER — APPOINTMENT (OUTPATIENT)
Dept: HEART AND VASCULAR | Facility: CLINIC | Age: 70
End: 2025-03-03

## 2025-03-03 VITALS
HEART RATE: 121 BPM | WEIGHT: 294 LBS | TEMPERATURE: 97.9 F | HEIGHT: 70 IN | SYSTOLIC BLOOD PRESSURE: 87 MMHG | DIASTOLIC BLOOD PRESSURE: 58 MMHG | OXYGEN SATURATION: 94 % | BODY MASS INDEX: 42.09 KG/M2

## 2025-03-03 VITALS
BODY MASS INDEX: 42.09 KG/M2 | DIASTOLIC BLOOD PRESSURE: 55 MMHG | SYSTOLIC BLOOD PRESSURE: 98 MMHG | TEMPERATURE: 97.5 F | HEIGHT: 70 IN | OXYGEN SATURATION: 94 % | WEIGHT: 294 LBS | HEART RATE: 91 BPM

## 2025-03-03 DIAGNOSIS — Z95.2 PRESENCE OF PROSTHETIC HEART VALVE: ICD-10-CM

## 2025-03-03 DIAGNOSIS — I35.0 NONRHEUMATIC AORTIC (VALVE) STENOSIS: ICD-10-CM

## 2025-03-03 DIAGNOSIS — Z98.61 CORONARY ANGIOPLASTY STATUS: Chronic | ICD-10-CM

## 2025-03-03 DIAGNOSIS — Z98.890 OTHER SPECIFIED POSTPROCEDURAL STATES: Chronic | ICD-10-CM

## 2025-03-03 PROCEDURE — 93306 TTE W/DOPPLER COMPLETE: CPT | Mod: 26

## 2025-03-03 PROCEDURE — 99204 OFFICE O/P NEW MOD 45 MIN: CPT | Mod: 1L

## 2025-03-03 PROCEDURE — 93010 ELECTROCARDIOGRAM REPORT: CPT

## 2025-03-03 PROCEDURE — 93306 TTE W/DOPPLER COMPLETE: CPT

## 2025-03-03 PROCEDURE — 93005 ELECTROCARDIOGRAM TRACING: CPT

## 2025-03-03 PROCEDURE — 99213 OFFICE O/P EST LOW 20 MIN: CPT

## 2025-03-03 RX ORDER — EZETIMIBE 10 MG/1
TABLET ORAL
Refills: 0 | Status: ACTIVE | COMMUNITY

## 2025-03-03 RX ORDER — RANOLAZINE 1000 MG/1
TABLET, EXTENDED RELEASE ORAL
Refills: 0 | Status: ACTIVE | COMMUNITY

## 2025-03-19 VITALS — WEIGHT: 300.05 LBS | HEIGHT: 70 IN

## 2025-03-19 RX ORDER — CLOPIDOGREL BISULFATE 75 MG/1
75 TABLET, FILM COATED ORAL DAILY
Refills: 0 | Status: DISCONTINUED | OUTPATIENT
Start: 2025-03-20 | End: 2025-03-21

## 2025-03-19 NOTE — H&P ADULT - NSICDXPASTSURGICALHX_GEN_ALL_CORE_FT
PAST SURGICAL HISTORY:  H/O prior ablation treatment     History of percutaneous coronary intervention     Left ventricular thrombus without MI      PAST SURGICAL HISTORY:  H/O prior ablation treatment     History of percutaneous coronary intervention     Left ventricular thrombus without MI     S/P TAVR (transcatheter aortic valve replacement)

## 2025-03-19 NOTE — H&P ADULT - NSICDXFAMILYHX_GEN_ALL_CORE_FT
FAMILY HISTORY:  No pertinent family history in first degree relatives     FAMILY HISTORY:  Mother  Still living? No  FH: heart failure, Age at diagnosis: 41-50

## 2025-03-19 NOTE — H&P ADULT - HISTORY OF PRESENT ILLNESS
45-year-old male presenting for an initial evaluation and consultation referred by Dr. Mike Ambriz ffor routine colon cancer screening, abdominal pain, and constipation.  He has had constipation and bloating.  He does eat a fairly healthy diet.  He has been given Prilosec over-the-counter previously however only had little relief.  He is also been taking MiraLAX for his constipation with relief.  His most recent labs were performed in June 2022.  And these were unremarkable.  His most recent A1c was 5.4.  He has had constipation for many years but it was worse over the past 3 years. He does feel worse when he does not have a BM. No blood when wiping. Cardiologist: Dr. Anna Bhatt   Escort:   Pharmacy:        69 M, former smoker, PMHx of BPH, PE (2013), DVT (2024), STEPHEN, fatty liver, gout, OA, HTN, HLD, DM2, CKD (baseline Cr 1.5), CAD/NSTEMI s/p PCI (RCA, October 2024), AF s/p Ablation (on Eliquis), LV thrombus s/p open heart extraction at [facility] (2020), pHTN (on Opsumit), HFpEF, and symptomatic  severe AS s/p TAVR 1/9/25 c/b new LBBB on EKG whom now presents to outpatient cardiologist for routine followup. Pt endorses ____________. Denies CP, SOB, edema, orthopnea, dizziness, lightheadedness or other sx.     NST 3/13/25: Abnormal myocardial perfusion images demonstrating mild-moderate inferoapical and inferior wall ischemia. Mild LV enlargement and systolic dysfunction. Normal RV size and function.   ECHO 3/3/25: LVEF 56%, no regional WMA, normal RV size and function, MYRON TAVR present in aortic position w/o prosthetic AS. (peak transaortic velocity is 2.28 m/s, peak transaortic gradient is 20.8 mmHg and mean transaortic gradient is 11.0 mmHg with an LVOT/aortic valve VTI ratio of 0.57. The effective orifice area is estimated at 2.17 cm²)    In light of multiple risk fx, CCS __ anginal symptoms and abnormal NST, referred for cardiac cath with possible PCI if clinically indicated.    Cardiologist: Dr. Anna Bhatt   Escort:   Pharmacy:    Hold eliquis 2 days prior --     69 M, former smoker, PMHx of BPH, PE (2013), DVT (2024), STEPHNE, fatty liver, gout, OA, HTN, HLD, DM2, CKD (baseline Cr 1.5), CAD/NSTEMI s/p PCI (RCA, October 2024), AF s/p Ablation (on Eliquis), LV thrombus s/p open heart extraction at [facility] (2020), pHTN (on Opsumit), HFpEF, and symptomatic  severe AS s/p TAVR 1/9/25 c/b new LBBB on EKG whom now presents to outpatient cardiologist for routine followup. Pt endorses ____________. Denies CP, SOB, edema, orthopnea, dizziness, lightheadedness or other sx.     NST 3/13/25: Abnormal myocardial perfusion images demonstrating mild-moderate inferoapical and inferior wall ischemia. Mild LV enlargement and systolic dysfunction. Normal RV size and function.   ECHO 3/3/25: LVEF 56%, no regional WMA, normal RV size and function, MYRON TAVR present in aortic position w/o prosthetic AS. (peak transaortic velocity is 2.28 m/s, peak transaortic gradient is 20.8 mmHg and mean transaortic gradient is 11.0 mmHg with an LVOT/aortic valve VTI ratio of 0.57. The effective orifice area is estimated at 2.17 cm²)    In light of multiple risk fx, CCS __ anginal symptoms and abnormal NST, referred for cardiac cath with possible PCI if clinically indicated.    Cardiologist: Dr. Anna Bhatt   Escort:   Pharmacy:    Confirmed holding eliquis since 3/17 --     69 M, former smoker, PMHx of BPH, PE (2013), DVT (2024), STEPHEN, fatty liver, gout, OA, HTN, HLD, DM2, CKD (baseline Cr 1.5), CAD/NSTEMI s/p PCI (RCA, October 2024), AF s/p Ablation (on Eliquis), LV thrombus s/p open heart extraction at [facility] (2020), pHTN (on Opsumit), HFpEF, and symptomatic  severe AS s/p TAVR 1/9/25 c/b new LBBB on EKG whom now presents to outpatient cardiologist for routine followup. Pt endorses ____________. Denies CP, SOB, edema, orthopnea, dizziness, lightheadedness or other sx.     NST 3/13/25: Abnormal myocardial perfusion images demonstrating mild-moderate inferoapical and inferior wall ischemia. Mild LV enlargement and systolic dysfunction. Normal RV size and function.   ECHO 3/3/25: LVEF 56%, no regional WMA, normal RV size and function, MYRON TAVR present in aortic position w/o prosthetic AS. (peak transaortic velocity is 2.28 m/s, peak transaortic gradient is 20.8 mmHg and mean transaortic gradient is 11.0 mmHg with an LVOT/aortic valve VTI ratio of 0.57. The effective orifice area is estimated at 2.17 cm²)    In light of multiple risk fx, CCS __ anginal symptoms and abnormal NST, referred for cardiac cath with possible PCI if clinically indicated.    Cardiologist: Dr. Anna Bhatt   Escort: Candice Garcia  Pharmacy: [facility]    70 yo M, former smoker, with a PMH of laible BPs (currently holding metoprolol), HLD, CAD w/ NSTEMI 10/2024 treated with GERBER RCA at [facility], AFib (on Eliquis, last dose 3/17 AM), frequent PVCs s/p failed ablation (pending repeat ablation), hx of LV thrombus s/p resection 2020 @ [facility], symptomatic severe AS s/p TAVR 1/9/25 c/b new LBBB, pHTN (on Opsumit), hx of PE in 2013 s/p treatment with Coumadin, hx of DVT and chronic LLE>RLE edema (noted in 2024 however determined to be old - did NOT fail Eliquis therapy), CKD (baseline Cr 1.5), BPH, STEPHEN, fatty liver, gout, OA who now presents to outpatient cardiologist for routine followup. Pt endorses BELTRAN to 1 city block, relieved w/ rest. Denies CP, edema, orthopnea, dizziness, lightheadedness, syncope, BRBPR, hematuria. NST 3/13/25: Abnormal myocardial perfusion images demonstrating mild-moderate inferoapical and inferior wall ischemia. Mild LV enlargement and systolic dysfunction. Normal RV size and function. ECHO 3/3/25: LVEF 56%, no regional WMA, normal RV size and function, MYRON TAVR present in aortic position w/o prosthetic AS. (peak transaortic velocity is 2.28 m/s, peak transaortic gradient is 20.8 mmHg and mean transaortic gradient is 11.0 mmHg with an LVOT/aortic valve VTI ratio of 0.57. The effective orifice area is estimated at 2.17 cm²). In light of multiple risk fx, CCS class III anginal equivlent symptoms and abnormal NST, referred for cardiac cath with possible PCI if clinically indicated.

## 2025-03-19 NOTE — H&P ADULT - ASSESSMENT
70 yo M, former smoker, with a PMH of laible BPs (currently holding metoprolol), HLD, CAD w/ NSTEMI 10/2024 treated with GERBER RCA at [facility], AFib (on Eliquis, last dose 3/17 AM), frequent PVCs s/p failed ablation (pending repeat ablation), hx of LV thrombus s/p resection 2020 @ [facility], symptomatic severe AS s/p TAVR 1/9/25 c/b new LBBB, pHTN (on Opsumit), hx of PE in 2013 s/p treatment with Coumadin, hx of DVT and chronic LLE>RLE edema (noted in 2024 however determined to be old - did NOT fail Eliquis therapy), CKD (baseline Cr 1.5), BPH, STEPHEN, fatty liver, gout, OA who now presents to outpatient cardiologist for routine followup. Pt endorses BELTRAN to 1 city block, relieved w/ rest. NST 3/13/25: Abnormal myocardial perfusion images demonstrating mild-moderate inferoapical and inferior wall ischemia. Mild LV enlargement and systolic dysfunction. Normal RV size and function. ECHO 3/3/25: LVEF 56%, no regional WMA, normal RV size and function, MYRON TAVR present in aortic position w/o prosthetic AS. (peak transaortic velocity is 2.28 m/s, peak transaortic gradient is 20.8 mmHg and mean transaortic gradient is 11.0 mmHg with an LVOT/aortic valve VTI ratio of 0.57. The effective orifice area is estimated at 2.17 cm²). In light of multiple risk fx, CCS class III anginal equivalent symptoms and abnormal NST, referred for cardiac cath with possible PCI if clinically indicated.     -H/H = 12.7/37.5. Pt denies BRBPR, hematuria, hematochezia, melena. Pt compliant with Plavix 75mg daily and last took yesterday AM. LD Eliquis 3/17 AM. Will load with Plavix 75mg x1 and ASA 325mg x1 pre cath   -Cr = 1.3. Hx of CKD with baseline Cr 1.5. EF normal. Euvolemic on exam. IVF with 250cc bolus x1 followed by NS @ 75cc/hr started pre procedure per protocol  -Mg 1.7 s/p Mg Ox 800mg x1 precath   -Type of sedation: moderate  -Candidate for sedation: yes   -Of note: patient stating he wishes to be full code but expresses concerns about ever remaining in a vegetative state as a possible consequence of a medical complication. Explained for this procedure pt will need to be FULL CODE for 30d and pt in agreement, however wished to fill out MOLST with specific wishes. Pt filled out HCP and informed of him of his wishes as well. MOLST placed in chart.    Risks & benefits of procedure and alternative therapy have been explained to the patient including but not limited to: allergic reaction, bleeding w/possible need for blood transfusion, infection, renal and vascular compromise, limb damage, arrhythmia, stroke, vessel dissection/perforation, Myocardial infarction, emergent CABG. Informed consent obtained and in chart.

## 2025-03-20 ENCOUNTER — TRANSCRIPTION ENCOUNTER (OUTPATIENT)
Age: 70
End: 2025-03-20

## 2025-03-20 ENCOUNTER — INPATIENT (INPATIENT)
Facility: HOSPITAL | Age: 70
LOS: 0 days | Discharge: HOME | End: 2025-03-21
Attending: STUDENT IN AN ORGANIZED HEALTH CARE EDUCATION/TRAINING PROGRAM | Admitting: STUDENT IN AN ORGANIZED HEALTH CARE EDUCATION/TRAINING PROGRAM
Payer: MEDICARE

## 2025-03-20 DIAGNOSIS — Z98.61 CORONARY ANGIOPLASTY STATUS: Chronic | ICD-10-CM

## 2025-03-20 DIAGNOSIS — I24.0 ACUTE CORONARY THROMBOSIS NOT RESULTING IN MYOCARDIAL INFARCTION: Chronic | ICD-10-CM

## 2025-03-20 DIAGNOSIS — Z98.890 OTHER SPECIFIED POSTPROCEDURAL STATES: Chronic | ICD-10-CM

## 2025-03-20 LAB
A1C WITH ESTIMATED AVERAGE GLUCOSE RESULT: 5.2 % — SIGNIFICANT CHANGE UP (ref 4–5.6)
ALBUMIN SERPL ELPH-MCNC: 4.2 G/DL — SIGNIFICANT CHANGE UP (ref 3.3–5)
ALP SERPL-CCNC: 56 U/L — SIGNIFICANT CHANGE UP (ref 40–120)
ALT FLD-CCNC: 22 U/L — SIGNIFICANT CHANGE UP (ref 10–45)
ANION GAP SERPL CALC-SCNC: 13 MMOL/L — SIGNIFICANT CHANGE UP (ref 5–17)
APTT BLD: 33.5 SEC — SIGNIFICANT CHANGE UP (ref 24.5–35.6)
BASOPHILS # BLD AUTO: 0.06 K/UL — SIGNIFICANT CHANGE UP (ref 0–0.2)
BASOPHILS NFR BLD AUTO: 0.8 % — SIGNIFICANT CHANGE UP (ref 0–2)
BILIRUB SERPL-MCNC: 0.3 MG/DL — SIGNIFICANT CHANGE UP (ref 0.2–1.2)
BUN SERPL-MCNC: 28 MG/DL — HIGH (ref 7–23)
CALCIUM SERPL-MCNC: 9.5 MG/DL — SIGNIFICANT CHANGE UP (ref 8.4–10.5)
CHLORIDE SERPL-SCNC: 105 MMOL/L — SIGNIFICANT CHANGE UP (ref 96–108)
CHOLEST SERPL-MCNC: 113 MG/DL — SIGNIFICANT CHANGE UP
CK MB CFR SERPL CALC: 1.4 NG/ML — SIGNIFICANT CHANGE UP (ref 0–6.7)
CK SERPL-CCNC: 36 U/L — SIGNIFICANT CHANGE UP (ref 30–200)
CO2 SERPL-SCNC: 24 MMOL/L — SIGNIFICANT CHANGE UP (ref 22–31)
CREAT SERPL-MCNC: 1.32 MG/DL — HIGH (ref 0.5–1.3)
EGFR: 59 ML/MIN/1.73M2 — LOW
EGFR: 59 ML/MIN/1.73M2 — LOW
EOSINOPHIL # BLD AUTO: 0.24 K/UL — SIGNIFICANT CHANGE UP (ref 0–0.5)
EOSINOPHIL NFR BLD AUTO: 3.2 % — SIGNIFICANT CHANGE UP (ref 0–6)
ESTIMATED AVERAGE GLUCOSE: 103 MG/DL — SIGNIFICANT CHANGE UP (ref 68–114)
GLUCOSE SERPL-MCNC: 97 MG/DL — SIGNIFICANT CHANGE UP (ref 70–99)
HCT VFR BLD CALC: 37.5 % — LOW (ref 39–50)
HDLC SERPL-MCNC: 48 MG/DL — SIGNIFICANT CHANGE UP
HGB BLD-MCNC: 12.7 G/DL — LOW (ref 13–17)
IMM GRANULOCYTES NFR BLD AUTO: 0.3 % — SIGNIFICANT CHANGE UP (ref 0–0.9)
INR BLD: 1.1 — SIGNIFICANT CHANGE UP (ref 0.85–1.16)
LIPID PNL WITH DIRECT LDL SERPL: 49 MG/DL — SIGNIFICANT CHANGE UP
LYMPHOCYTES # BLD AUTO: 1.74 K/UL — SIGNIFICANT CHANGE UP (ref 1–3.3)
LYMPHOCYTES # BLD AUTO: 23 % — SIGNIFICANT CHANGE UP (ref 13–44)
MCHC RBC-ENTMCNC: 29.6 PG — SIGNIFICANT CHANGE UP (ref 27–34)
MCHC RBC-ENTMCNC: 33.9 G/DL — SIGNIFICANT CHANGE UP (ref 32–36)
MCV RBC AUTO: 87.4 FL — SIGNIFICANT CHANGE UP (ref 80–100)
MONOCYTES # BLD AUTO: 0.57 K/UL — SIGNIFICANT CHANGE UP (ref 0–0.9)
MONOCYTES NFR BLD AUTO: 7.5 % — SIGNIFICANT CHANGE UP (ref 2–14)
NEUTROPHILS # BLD AUTO: 4.93 K/UL — SIGNIFICANT CHANGE UP (ref 1.8–7.4)
NEUTROPHILS NFR BLD AUTO: 65.2 % — SIGNIFICANT CHANGE UP (ref 43–77)
NON HDL CHOLESTEROL: 65 MG/DL — SIGNIFICANT CHANGE UP
NRBC BLD AUTO-RTO: 0 /100 WBCS — SIGNIFICANT CHANGE UP (ref 0–0)
PLATELET # BLD AUTO: 149 K/UL — LOW (ref 150–400)
POTASSIUM SERPL-MCNC: 4.3 MMOL/L — SIGNIFICANT CHANGE UP (ref 3.5–5.3)
POTASSIUM SERPL-SCNC: 4.3 MMOL/L — SIGNIFICANT CHANGE UP (ref 3.5–5.3)
PROT SERPL-MCNC: 7.9 G/DL — SIGNIFICANT CHANGE UP (ref 6–8.3)
PROTHROM AB SERPL-ACNC: 12.8 SEC — SIGNIFICANT CHANGE UP (ref 9.9–13.4)
RBC # BLD: 4.29 M/UL — SIGNIFICANT CHANGE UP (ref 4.2–5.8)
RBC # FLD: 15.1 % — HIGH (ref 10.3–14.5)
SODIUM SERPL-SCNC: 142 MMOL/L — SIGNIFICANT CHANGE UP (ref 135–145)
TRIGL SERPL-MCNC: 79 MG/DL — SIGNIFICANT CHANGE UP
WBC # BLD: 7.56 K/UL — SIGNIFICANT CHANGE UP (ref 3.8–10.5)
WBC # FLD AUTO: 7.56 K/UL — SIGNIFICANT CHANGE UP (ref 3.8–10.5)

## 2025-03-20 PROCEDURE — 92928 PRQ TCAT PLMT NTRAC ST 1 LES: CPT | Mod: RC

## 2025-03-20 PROCEDURE — 93010 ELECTROCARDIOGRAM REPORT: CPT | Mod: 76

## 2025-03-20 PROCEDURE — 92978 ENDOLUMINL IVUS OCT C 1ST: CPT | Mod: 26,RC

## 2025-03-20 PROCEDURE — 99152 MOD SED SAME PHYS/QHP 5/>YRS: CPT

## 2025-03-20 PROCEDURE — 0523T NTRAPX C FFR W/3D FUNCJL MAP: CPT

## 2025-03-20 PROCEDURE — 93458 L HRT ARTERY/VENTRICLE ANGIO: CPT | Mod: 26,59

## 2025-03-20 RX ORDER — APIXABAN 2.5 MG/1
5 TABLET, FILM COATED ORAL EVERY 12 HOURS
Refills: 0 | Status: DISCONTINUED | OUTPATIENT
Start: 2025-03-21 | End: 2025-03-21

## 2025-03-20 RX ORDER — ATORVASTATIN CALCIUM 80 MG/1
40 TABLET, FILM COATED ORAL AT BEDTIME
Refills: 0 | Status: DISCONTINUED | OUTPATIENT
Start: 2025-03-20 | End: 2025-03-21

## 2025-03-20 RX ORDER — ASPIRIN 325 MG
325 TABLET ORAL ONCE
Refills: 0 | Status: COMPLETED | OUTPATIENT
Start: 2025-03-20 | End: 2025-03-20

## 2025-03-20 RX ORDER — FUROSEMIDE 10 MG/ML
40 INJECTION INTRAMUSCULAR; INTRAVENOUS DAILY
Refills: 0 | Status: DISCONTINUED | OUTPATIENT
Start: 2025-03-21 | End: 2025-03-21

## 2025-03-20 RX ORDER — MACITENTAN 10 MG/1
10 TABLET, FILM COATED ORAL DAILY
Refills: 0 | Status: DISCONTINUED | OUTPATIENT
Start: 2025-03-21 | End: 2025-03-21

## 2025-03-20 RX ORDER — METOPROLOL SUCCINATE 50 MG/1
25 TABLET, EXTENDED RELEASE ORAL EVERY 12 HOURS
Refills: 0 | Status: DISCONTINUED | OUTPATIENT
Start: 2025-03-20 | End: 2025-03-21

## 2025-03-20 RX ORDER — MONTELUKAST SODIUM 10 MG/1
10 TABLET ORAL DAILY
Refills: 0 | Status: DISCONTINUED | OUTPATIENT
Start: 2025-03-21 | End: 2025-03-21

## 2025-03-20 RX ORDER — FENOFIBRATE 160 MG/1
145 TABLET ORAL DAILY
Refills: 0 | Status: DISCONTINUED | OUTPATIENT
Start: 2025-03-20 | End: 2025-03-21

## 2025-03-20 RX ORDER — RANOLAZINE 1000 MG/1
1 TABLET, FILM COATED, EXTENDED RELEASE ORAL
Refills: 0 | DISCHARGE

## 2025-03-20 RX ORDER — RANOLAZINE 1000 MG/1
500 TABLET, FILM COATED, EXTENDED RELEASE ORAL
Refills: 0 | Status: DISCONTINUED | OUTPATIENT
Start: 2025-03-20 | End: 2025-03-21

## 2025-03-20 RX ORDER — MAGNESIUM OXIDE 400 MG
800 TABLET ORAL ONCE
Refills: 0 | Status: COMPLETED | OUTPATIENT
Start: 2025-03-20 | End: 2025-03-20

## 2025-03-20 RX ORDER — TAMSULOSIN HYDROCHLORIDE 0.4 MG/1
0.4 CAPSULE ORAL AT BEDTIME
Refills: 0 | Status: DISCONTINUED | OUTPATIENT
Start: 2025-03-20 | End: 2025-03-21

## 2025-03-20 RX ORDER — ASPIRIN 325 MG
81 TABLET ORAL DAILY
Refills: 0 | Status: DISCONTINUED | OUTPATIENT
Start: 2025-03-20 | End: 2025-03-21

## 2025-03-20 RX ORDER — EZETIMIBE 10 MG/1
1 TABLET ORAL
Refills: 0 | DISCHARGE

## 2025-03-20 RX ADMIN — Medication 240 MILLILITER(S): at 18:30

## 2025-03-20 RX ADMIN — Medication 325 MILLIGRAM(S): at 13:09

## 2025-03-20 RX ADMIN — Medication 75 MILLILITER(S): at 13:10

## 2025-03-20 RX ADMIN — METOPROLOL SUCCINATE 25 MILLIGRAM(S): 50 TABLET, EXTENDED RELEASE ORAL at 22:47

## 2025-03-20 RX ADMIN — Medication 800 MILLIGRAM(S): at 19:23

## 2025-03-20 RX ADMIN — ATORVASTATIN CALCIUM 40 MILLIGRAM(S): 80 TABLET, FILM COATED ORAL at 22:47

## 2025-03-20 RX ADMIN — RANOLAZINE 500 MILLIGRAM(S): 1000 TABLET, FILM COATED, EXTENDED RELEASE ORAL at 19:22

## 2025-03-20 RX ADMIN — Medication 1000 MILLILITER(S): at 13:09

## 2025-03-20 RX ADMIN — TAMSULOSIN HYDROCHLORIDE 0.4 MILLIGRAM(S): 0.4 CAPSULE ORAL at 22:47

## 2025-03-20 RX ADMIN — CLOPIDOGREL BISULFATE 75 MILLIGRAM(S): 75 TABLET, FILM COATED ORAL at 13:09

## 2025-03-20 NOTE — DISCHARGE NOTE PROVIDER - NSDCFUSCHEDAPPT_GEN_ALL_CORE_FT
Sharmin Torres  [facility] Physician [facility]  HEARTVASC [address]  Scheduled Appointment: 05/05/2025

## 2025-03-20 NOTE — DISCHARGE NOTE PROVIDER - NSDCFUADDINST_GEN_ALL_CORE_FT
You have a diagnosis of coronary/peripheral artery disease and received a stent to your ______________ coronary artery.  You have been started on Aspirin 81mg daily and Plavix (Clopidogrel) 75mg daily/Brilinta(Ticagrelor) 90mg Twice Daily/Effient (Prasugrel) 5/10mg daily. You MUST continue taking the daily Aspirin and Plavix/Brilinta/Effient to ensure your stent does not close. DO NOT STOP THESE MEDICATIONS FOR ANY REASON UNLESS OTHERWISE INDICATED BY YOUR CARDIOLOGIST BECAUSE THIS WILL PUT YOU AT RISK FOR A HEART ATTACK OR SUDDEN SEVERE LEG PAIN DUE TO BLOCKAGE OF BLOOD FLOW TO LEG. You should refrain from strenuous activity and heavy lifting for 1 week. Please make a follow up appointment with your cardiologist within 1-2 weeks of your discharge. All of your prescriptions have been sent electronically to your pharmacy.    You have been given a Stent Card to carry with you in your wallet.  Make Photocopies or take a picture of card so you have a backup copy.  This card has important information for any possible future Radiology/MRI studies.      You underwent a coronary/peripheral angiogram and should wait 3 days before returning to ordinary activities. Do not drive for 2 days. Do not lift more than 5 pounds with affected arm for 3 days or more than 10 pounds if groin access for 5 days.  The catheter from your groin/wrist was removed and bleeding was stopped with manual pressure or an arterial closure device (Angioseal/Perclose/Vascade) in the groin. After 24hours you may take off the dressing and shower. Wash the site with soap and water.  There is no need to put on another bandage.  Avoid tub baths, hot tubs or swimming for 5 days to prevent infection.  If you notice Bleeding or hematoma formation (collection of blood under the skin), drainage or redness at the puncture site, acute pain, numbness, decrease in strength, coolness or pale coloration of skin of the leg or hand, please call [phone]. Consult your doctor before returning to vigorous activity.    •	If you are a diabetic and you take Metformin: DO NOT TAKE your Metformin for two days. This medication can interact with the contrast used during your procedure therefore we want to ensure the contrast has left your body prior to you restarting your Metformin.   o	Make sure you monitor your finger sticks and diet while you are not taking your Metformin!

## 2025-03-20 NOTE — DISCHARGE NOTE PROVIDER - ATTENDING DISCHARGE PHYSICAL EXAMINATION:
Mr. Mcgill is a 69M former smoker, Atrial Fibrillation, CAD (NSTEMI in Oct 2024), hx of LV thrombus, Hx of AS with TAVR in Jan 2025 presenting with dyspnea on exertion and abnormal NST- underwent PCI to RCA by RRA access.     Exam:  NAD  JVP normal  Lungs CTA  RRA access, pulse intact.   WWP, no edema    CAD- Triple therapy for 1 week-- Eliquis/Plavix, statin, BB, ranexa  Atrial fibrillation- Eliquis as above. Drop triple therapy after 1wk, BB  HTN- BB, lasix  HLD- statin, vascepa

## 2025-03-20 NOTE — PATIENT PROFILE ADULT - FALL HARM RISK - RISK INTERVENTIONS
Pre-Operative Diagnosis            1. ?Gas gangrene left foot        2. ?Necrotizing fasciitis left foot        3. ?Abscess cellulitis left foot                  Post-Operative Diagnosis            Same                  Performed by            Dr. Oz Powell                  Assistant            None                  Anesthetic Used            MAC with local                  Procedures Performed            1. ?Left foot transmetatarsal amputation                  Description of Procedure            Patient was consented for the procedure. ?He was then brought outside the operating room with the name and allergy bands were rechecked. ?He was then brought into the operating room and positioned on the table in supine position MAC anesthesia was started. ?The left foot was prepped and draped in aseptic manner with Betadine solution. ?A surgical timeout was performed. ?At this point an amputation was performed to the level of the midfoot. ?The #10 surgical blade was utilized to perform the amputation across the mid level of the metatarsals. ?Sharp dissection was carried full thickness. ?I then utilized a microsagittal saw to resect the metatarsals at the level of the midshaft. ?The distal forefoot was then amputated and passed to the back table. ?This is sent to pathology. ?Soft tissue wound culture was taken of the abscess. ?There was significant amount of purulence noted. ?There was a severe amount of necrosis noted. ?He did have quite good blood flow during the amputation. ?The bleeding was controlled with Bovie and silk hand ties. ?The dorsalis pedis and deep intermetatarsal artery was found and tied off to control bleeding. ?Copious amounts of irrigation with antibiotic solution was utilized in order to flush the wound. ?I did obtain primary closure of this amputation. ?I utilized 0 Vicryl suture and 0 Prolene suture. ?There is no skin tension noted. ?He did have a brisk capillary refill time  to the plantar amputation skin flap. ?No tourniquet was utilized during the amputation. ?A dry sterile dressing was then applied followed by posterior compression splint. ?He was then awakened. ?10 cc of 0.5% Marcaine plain was utilized for postoperative anesthesia. ?He was then transferred to the PACU in stable condition.                  Findings            A significant amount of necrosis and abscess was noted in the distal forefoot. ?Following amputation he did have adequate blood supply.                  Specimens Removed            Distal forefoot amputation, soft tissue wound culture                  Estimated Blood Loss            Approximately 250 cc                  Blood Administered (Yes/No)            No                  Complications            None                  Grafts/Implants            None                   Assistance OOB with selected safe patient handling equipment/Assistance with ambulation/Communicate Fall Risk and Risk Factors to all staff, patient, and family/Monitor gait and stability/Reinforce activity limits and safety measures with patient and family/Sit up slowly, dangle for a short time, stand at bedside before walking/Use of alarms - bed, chair and/or voice tab/Visual Cue: Yellow wristband/Bed in lowest position, wheels locked, appropriate side rails in place/Call bell, personal items and telephone in reach/Instruct patient to call for assistance before getting out of bed or chair/Non-slip footwear when patient is out of bed/[place] to call system/Physically safe environment - no spills, clutter or unnecessary equipment/Purposeful Proactive Rounding/Room/bathroom lighting operational, light cord in reach

## 2025-03-20 NOTE — DISCHARGE NOTE PROVIDER - NSDCMRMEDTOKEN_GEN_ALL_CORE_FT
Breztri Aerosphere 160 mcg-9 mcg-4.8 mcg/inh inhalation aerosol: 2 puff(s) inhaled once a day  clopidogrel 75 mg oral tablet: 1 tab(s) orally once a day  Eliquis 5 mg oral tablet: 1 tab(s) orally every 12 hours  fenofibrate 145 mg oral tablet: 1 tab(s) orally once a day  furosemide 40 mg oral tablet: 1 tab(s) orally once a day  Lipitor 40 mg oral tablet: 1 tab(s) orally once a day (at bedtime)  metoprolol tartrate 25 mg oral tablet: 1 tab(s) orally every 12 hours  montelukast 10 mg oral tablet: 1 tab(s) orally once a day  Mounjaro 5 mg/0.5 mL subcutaneous solution: 10 milligram(s) subcutaneously once a week  Opsumit 10 mg oral tablet: 1 tab(s) orally once a day  pantoprazole 40 mg oral delayed release tablet: 1 tab(s) orally once a day (before a meal)  Potassium Chloride (Eqv-K-Tab) 10 mEq oral tablet, extended release: 1 tab(s) orally once a day  ranolazine 500 mg oral granule, extended release: 1 packet(s) orally 2 times a day  tamsulosin 0.4 mg oral capsule: 1 cap(s) orally once a day  Vascepa 1 g oral capsule: 2 cap(s) orally every 12 hours   aspirin 81 mg oral delayed release tablet: 1 tab(s) orally once a day  atorvastatin 80 mg oral tablet: 1 tab(s) orally once a day (at bedtime)  Breztri Aerosphere 160 mcg-9 mcg-4.8 mcg/inh inhalation aerosol: 2 puff(s) inhaled once a day  Cardiac Rehab: We have provided you with a prescription for cardiac rehab which is medically supervised exercise program for your heart and has been shown to improve the quantity and quality of life of people with heart disease like yours. You should attend cardiac rehab 3 times per week for 12 weeks. We have provided you with a list of nearby facilities. Please call your insurance carrier to determine which of these facilities are covered under your plan. Please discuss with your outpatient cardiologist for referral.  &#x27;  clopidogrel 75 mg oral tablet: 1 tab(s) orally once a day  Eliquis 5 mg oral tablet: 1 tab(s) orally every 12 hours  furosemide 40 mg oral tablet: 1 tab(s) orally once a day  metoprolol tartrate 25 mg oral tablet: 1 tab(s) orally every 12 hours  montelukast 10 mg oral tablet: 1 tab(s) orally once a day  Mounjaro 5 mg/0.5 mL subcutaneous solution: 10 milligram(s) subcutaneously once a week  Opsumit 10 mg oral tablet: 1 tab(s) orally once a day  pantoprazole 40 mg oral delayed release tablet: 1 tab(s) orally once a day (before a meal)  Potassium Chloride (Eqv-K-Tab) 10 mEq oral tablet, extended release: 1 tab(s) orally once a day  ranolazine 500 mg oral granule, extended release: 1 packet(s) orally 2 times a day  tamsulosin 0.4 mg oral capsule: 1 cap(s) orally once a day  Vascepa 1 g oral capsule: 2 cap(s) orally every 12 hours   aspirin 81 mg oral delayed release tablet: 1 tab(s) orally once a day  atorvastatin 80 mg oral tablet: 1 tab(s) orally once a day (at bedtime)  Breztri Aerosphere 160 mcg-9 mcg-4.8 mcg/inh inhalation aerosol: 2 puff(s) inhaled once a day  Cardiac Rehab: We have provided you with a prescription for cardiac rehab which is medically supervised exercise program for your heart and has been shown to improve the quantity and quality of life of people with heart disease like yours. You should attend cardiac rehab 3 times per week for 12 weeks. We have provided you with a list of nearby facilities. Please call your insurance carrier to determine which of these facilities are covered under your plan. Please discuss with your outpatient cardiologist for referral.  &#x27;  clopidogrel 75 mg oral tablet: 1 tab(s) orally once a day  Eliquis 5 mg oral tablet: 1 tab(s) orally every 12 hours  furosemide 40 mg oral tablet: 1 tab(s) orally once a day  Lipitor 80 mg oral tablet: 1 tab(s) orally once a day (at bedtime)  metoprolol tartrate 25 mg oral tablet: 1 tab(s) orally every 12 hours  montelukast 10 mg oral tablet: 1 tab(s) orally once a day  Mounjaro 5 mg/0.5 mL subcutaneous solution: 10 milligram(s) subcutaneously once a week  Opsumit 10 mg oral tablet: 1 tab(s) orally once a day  pantoprazole 40 mg oral delayed release tablet: 1 tab(s) orally once a day (before a meal)  Potassium Chloride (Eqv-K-Tab) 10 mEq oral tablet, extended release: 1 tab(s) orally once a day  ranolazine 500 mg oral granule, extended release: 1 packet(s) orally 2 times a day  tamsulosin 0.4 mg oral capsule: 1 cap(s) orally once a day  Vascepa 1 g oral capsule: 2 cap(s) orally every 12 hours

## 2025-03-20 NOTE — DISCHARGE NOTE PROVIDER - NSDCCPCAREPLAN_GEN_ALL_CORE_FT
PRINCIPAL DISCHARGE DIAGNOSIS  Diagnosis: CAD (coronary artery disease)  Assessment and Plan of Treatment: -You underwent a cardiac catheterization on 3/20/25 and the blockage in your RIGHT CORONARY ARTERY was opened with stent placement. You are to take ASPIRIN 81 mg daily, PLAVIC (CLOPIDOGREL) 75 mg daily, and ELIQUIS (APIXABAN) 5 mg TWICE DAILY for the next few weeks. After 1 week, please stop your ASPIRIN and only continue PLAVIX once daily and ELIQUIS TWICE DAILY.    NEVER MISS A DOSE OF THESE MEDICATION.  IF YOU DO, YOU ARE AT RISK OF YOUR STENT CLOSING AND HAVING A HEART ATTACK. DO NOT STOP THESE TWO MEDICATIONS UNLESS INSTRUCTED TO DO SO BY YOUR CARDIOLOGIST.  Your procedure was done through your wrist. You do not need to keep this area covered and you may shower. Please avoid any heavy lifting  (no more than 3 to 5 lbs) or strenuous activity for five days. If you develop any swelling, bleeding, hardening of the skin (hematoma formation), acute pain, numbness/tingling  in your arm please contact your doctor immediately or call our 24/7 line: [phone] Please return to the hospital/seek immediate medical attention if worsening of symptoms- including not limited to chest pain, shortness of breath. Please follow up with Dr. Bhatt in 1-2 weeks. Please call his office and make an appointment to see him. Please continue a heart healthy diet low in sodium, cholesterol, and fat.  We have provided you with a prescription for cardiac rehab which is medically supervised exercise program for your heart and has been shown to improve the quantity and quality of life of people with heart disease like yours. You should attend cardiac rehab 3 times per week for 12 weeks. We have provided you with a list of nearby facilities. Please call your insurance carrier to determine which of these facilities are covered under your plan.      SECONDARY DISCHARGE DIAGNOSES  Diagnosis: Hyperlipidemia  Assessment and Plan of Treatment: Too much cholesterol in your arteries may lead to a buildup of plaque known as atherosclerosis and contribute to heart disease. Please continue:  Appropriate refills were sent to your preferred pharmacy. Please follow-up with your cardiologist for further management.       PRINCIPAL DISCHARGE DIAGNOSIS  Diagnosis: CAD (coronary artery disease)  Assessment and Plan of Treatment: -You underwent a cardiac catheterization on 3/20/25 and the blockage in your RIGHT CORONARY ARTERY was opened with stent placement.   You are to take ASPIRIN 81 mg daily, PLAVIX (CLOPIDOGREL) 75 mg daily, and ELIQUIS (APIXABAN) 5 mg TWICE DAILY EVERYDAY FOR THE NEXT 7 DAYS.  After 1 week, please stop your ASPIRIN and only continue PLAVIX once daily and ELIQUIS TWICE DAILY.    Please also be sure to take the Pantoprazole 40mg once daily before breakfast. This is to protect your stomach lining to prevent any ulcers from forming.   NEVER MISS A DOSE OF THESE MEDICATION.  IF YOU DO, YOU ARE AT RISK OF YOUR STENT CLOSING AND HAVING A HEART ATTACK. DO NOT STOP THESE TWO MEDICATIONS UNLESS INSTRUCTED TO DO SO BY YOUR CARDIOLOGIST.  Your procedure was done through your wrist. You do not need to keep this area covered and you may shower. Please avoid any heavy lifting  (no more than 3 to 5 lbs) or strenuous activity for five days. If you develop any swelling, bleeding, hardening of the skin (hematoma formation), acute pain, numbness/tingling  in your arm please contact your doctor immediately or call our 24/7 line: [phone] Please return to the hospital/seek immediate medical attention if worsening of symptoms- including not limited to chest pain, shortness of breath, dark bloody stool, or blood in your mouth.   Please follow up with Dr. Bhatt in 1-2 weeks. Please call his office and make an appointment to see him. Please continue a heart healthy diet low in sodium, cholesterol, and fat.  We have provided you with a prescription for cardiac rehab which is medically supervised exercise program for your heart and has been shown to improve the quantity and quality of life of people with heart disease like yours. You should attend cardiac rehab 3 times per week for 12 weeks. We have provided you with a list of nearby facilities. Please call your insurance carrier to determine which of these facilities are covered under your plan.      SECONDARY DISCHARGE DIAGNOSES  Diagnosis: Hyperlipidemia  Assessment and Plan of Treatment: Too much cholesterol in your arteries may lead to a buildup of plaque known as atherosclerosis and contribute to heart disease. Please continue:  Appropriate refills were sent to your preferred pharmacy. Please follow-up with your cardiologist for further management.  Please start Atorvastatin 80mg once daily at bedtime, this is an increase to the previous 40mg daily at bedtime you were taking before.  Please also STOP Fenofibrate.       Diagnosis: HTN (hypertension)  Assessment and Plan of Treatment: Continue your home medications.    Diagnosis: Atrial fibrillation  Assessment and Plan of Treatment: Please continue your Eliquis 5mg twice daily as above as well as your Metoprolol tartrate 25mg twice daily to keep your heart in normal rhythm.  Please follow up with Dr. Bhatt.

## 2025-03-20 NOTE — DISCHARGE NOTE PROVIDER - HOSPITAL COURSE
68 yo M, former smoker, with a PMH of laible BPs (currently holding metoprolol), HLD, CAD w/ NSTEMI 10/2024 treated with GERBER RCA at [facility], AFib (on Eliquis, last dose 3/17 AM), frequent PVCs s/p failed ablation (pending repeat ablation), hx of LV thrombus s/p resection 2020 @ [facility], symptomatic severe AS s/p TAVR 1/9/25 c/b new LBBB, pHTN (on Opsumit), hx of PE in 2013 s/p treatment with Coumadin, hx of DVT and chronic LLE>RLE edema (noted in 2024 however determined to be old - did NOT fail Eliquis therapy), CKD (baseline Cr 1.5), BPH, STEPHEN, fatty liver, gout, OA who now presents to outpatient cardiologist for routine followup. Pt endorses BELTRAN to 1 city block, relieved w/ rest. Denies CP, edema, orthopnea, dizziness, lightheadedness, syncope, BRBPR, hematuria. NST 3/13/25: Abnormal myocardial perfusion images demonstrating mild-moderate inferoapical and inferior wall ischemia. Mild LV enlargement and systolic dysfunction. Normal RV size and function. ECHO 3/3/25: LVEF 56%, no regional WMA, normal RV size and function, MYRON TAVR present in aortic position w/o prosthetic AS. (peak transaortic velocity is 2.28 m/s, peak transaortic gradient is 20.8 mmHg and mean transaortic gradient is 11.0 mmHg with an LVOT/aortic valve VTI ratio of 0.57. The effective orifice area is estimated at 2.17 cm²). In light of multiple risk fx, CCS class III anginal equivlent symptoms and abnormal NST, referred for cardiac cath with possible PCI if clinically indicated.    Patient with PVCs on telemetry. Recommend to continue his beta-blocker and follow-up with Dr. Torres.     Pt now s/p cardiac cath 3/20/25 - GERBER x 1 mRCA (80%), mLAD: 50%, Lcx: diffuse; mild disease, EDP: 16; R TR at 8 pm. Pt admitted overnight for observation and telemetry monitoring. Pt seen and examined at bedside this AM without any complaints or events overnight, VSS, labs and telemetry reviewed and pt stable for discharge as discussed with Dr. Richey. Pt has received appropriate discharge instructions, including medication regimen, access site management and follow up with Dr. Bhatt n 1-2 weeks.    Discharge medications: ASA 81mg QD, Plavix 75mg QD, Eliquis (He should stop the Aspirin 81 mg after 1 week)  Breztri Aerosphere 160 mcg-9 mcg-4.8 mcg/inh inhalation aerosol: 2 puff(s) inhaled once a day  fenofibrate 145 mg oral tablet: 1 tab(s) orally once a day  furosemide 40 mg oral tablet: 1 tab(s) orally once a day  Lipitor 40 mg oral tablet: 1 tab(s) orally once a day (at bedtime)  metoprolol tartrate 25 mg oral tablet: 1 tab(s) orally every 12 hours  montelukast 10 mg oral tablet: 1 tab(s) orally once a day  Mounjaro 5 mg/0.5 mL subcutaneous solution: 10 milligram(s) subcutaneously once a week  Opsumit 10 mg oral tablet: 1 tab(s) orally once a day  pantoprazole 40 mg oral delayed release tablet: 1 tab(s) orally once a day (before a meal)  Potassium Chloride (Eqv-K-Tab) 10 mEq oral tablet, extended release: 1 tab(s) orally once a day  ranolazine 500 mg oral granule, extended release: 1 packet(s) orally 2 times a day  tamsulosin 0.4 mg oral capsule: 1 cap(s) orally once a day  Vascepa 1 g oral capsule: 2 cap(s) orally every 12 hours    Cardiac Rehab (Post PCI): Education on benefits of Cardiac Rehab provided to patient: Yes, Referral and Prescription Given for Cardiac Rehab: Yes, Pt given list of locations & instructed to contact their insurance company to review list of participating providers.    Pt discharge copies detail cardiovascular history, medication testing/treatments OR has created a patient portal account and instructed to provider their records at their 1st appointment.    CVD (GLP-1 receptor agonist, SGLT2 inhibitor) meds discussed w/ patients and encouraged to discuss further with PMD or endo at next visit.   68 yo M, former smoker, with a PMH of labile BPs (currently holding metoprolol), HLD, CAD w/ NSTEMI (10/2024 s/p GERBER RCA at [facility]), AFib (on Eliquis, last dose 3/17 AM), frequent PVCs s/p failed ablation (pending repeat ablation), hx of LV thrombus (s/p resection 2020 @ [facility]), symptomatic severe AS (s/p TAVR 1/9/25 c/b new LBBB), pHTN (on Opsumit), hx of PE in 2013 (s/p Coumadin), hx of DVT and chronic LLE>RLE edema (noted in 2024 however determined to be old - did NOT fail Eliquis therapy), CKD (baseline Cr 1.5), BPH, STEPHEN, fatty liver, gout, OA who now presents to outpatient cardiologist for routine follow up. Pt endorses BELTRAN to 1 city block, relieved w/ rest. Denies CP, edema, orthopnea, dizziness, lightheadedness, syncope, BRBPR, hematuria. NST 3/13/25: Abnormal myocardial perfusion images demonstrating mild-moderate inferoapical and inferior wall ischemia. Mild LV enlargement and systolic dysfunction. Normal RV size and function. ECHO 3/3/25: LVEF 56%, no regional WMA, normal RV size and function, MYRON TAVR present in aortic position w/o prosthetic AS. (peak transaortic velocity is 2.28 m/s, peak transaortic gradient is 20.8 mmHg and mean transaortic gradient is 11.0 mmHg with an LVOT/aortic valve VTI ratio of 0.57. The effective orifice area is estimated at 2.17 cm²). In light of multiple risk fx, CCS class III anginal equivalent symptoms and abnormal NST, referred for cardiac cath with possible PCI if clinically indicated.    Patient with PVCs on telemetry. Recommend to continue his beta-blocker and follow-up with Dr. Torres.     Pt now s/p cardiac cath 3/20/25 - GERBER x 1 mRCA (80%), mLAD: 50%, Lcx: diffuse; mild disease, EDP: 16; access: R TR. Pt admitted overnight for observation and telemetry monitoring. Pt seen and examined at bedside this AM without any complaints or events overnight, VSS, labs and telemetry reviewed and pt stable for discharge as discussed with Dr. Richey. Pt has received appropriate discharge instructions, including medication regimen, access site management and follow up with Dr. Bhatt in 1-2 weeks.    Discharge medications: ASA 81mg QD, Plavix 75mg QD, Eliquis (He should stop the Aspirin 81 mg after 1 week)  Breztri Aerosphere 160 mcg-9 mcg-4.8 mcg/inh inhalation aerosol: 2 puff(s) inhaled once a day  fenofibrate 145 mg oral tablet: 1 tab(s) orally once a day  furosemide 40 mg oral tablet: 1 tab(s) orally once a day  Lipitor 40 mg oral tablet: 1 tab(s) orally once a day (at bedtime)  metoprolol tartrate 25 mg oral tablet: 1 tab(s) orally every 12 hours  montelukast 10 mg oral tablet: 1 tab(s) orally once a day  Mounjaro 5 mg/0.5 mL subcutaneous solution: 10 milligram(s) subcutaneously once a week  Opsumit 10 mg oral tablet: 1 tab(s) orally once a day  pantoprazole 40 mg oral delayed release tablet: 1 tab(s) orally once a day (before a meal)  Potassium Chloride (Eqv-K-Tab) 10 mEq oral tablet, extended release: 1 tab(s) orally once a day  ranolazine 500 mg oral granule, extended release: 1 packet(s) orally 2 times a day  tamsulosin 0.4 mg oral capsule: 1 cap(s) orally once a day  Vascepa 1 g oral capsule: 2 cap(s) orally every 12 hours    Cardiac Rehab (Post PCI): Education on benefits of Cardiac Rehab provided to patient: Yes, Referral and Prescription Given for Cardiac Rehab: Yes, Pt given list of locations & instructed to contact their insurance company to review list of participating providers.    Pt discharge copies detail cardiovascular history, medication testing/treatments OR has created a patient portal account and instructed to provider their records at their 1st appointment.    CVD (GLP-1 receptor agonist, SGLT2 inhibitor) meds discussed w/ patients and encouraged to discuss further with PMD or endo at next visit.   70 yo M, former smoker, with a PMH of labile BPs (currently holding metoprolol), HLD, CAD w/ NSTEMI (10/2024 s/p GERBER RCA at [facility]), AFib (on Eliquis, last dose 3/17 AM), frequent PVCs s/p failed ablation (pending repeat ablation), hx of LV thrombus (s/p resection 2020 @ [facility]), symptomatic severe AS (s/p TAVR 1/9/25 c/b new LBBB), pHTN (on Opsumit), hx of PE in 2013 (s/p Coumadin), hx of DVT and chronic LLE>RLE edema (noted in 2024 however determined to be old - did NOT fail Eliquis therapy), CKD (baseline Cr 1.5), BPH, STEPHEN, fatty liver, gout, OA who now presents to outpatient cardiologist for routine follow up. Pt endorses BELTRAN to 1 city block, relieved w/ rest. Denies CP, edema, orthopnea, dizziness, lightheadedness, syncope, BRBPR, hematuria.     NST 3/13/25: Abnormal myocardial perfusion images demonstrating mild-moderate inferoapical and inferior wall ischemia. Mild LV enlargement and systolic dysfunction. Normal RV size and function.     ECHO 3/3/25: LVEF 56%, no regional WMA, normal RV size and function, MYRON TAVR present in aortic position w/o prosthetic AS. (peak transaortic velocity is 2.28 m/s, peak transaortic gradient is 20.8 mmHg and mean transaortic gradient is 11.0 mmHg with an LVOT/aortic valve VTI ratio of 0.57. The effective orifice area is estimated at 2.17 cm²).    Patient with PVCs on telemetry. Recommend to continue his beta-blocker and follow-up with Dr. Torres.     Pt now s/p cardiac cath 3/20/25 - GERBER x 1 mRCA (80%), mLAD: 50%, Lcx: diffuse; mild disease, EDP: 16; access: R TR.     Pt admitted overnight for observation and telemetry monitoring. Pt seen and examined at bedside this AM without any complaints or events overnight, VSS, labs and telemetry reviewed and pt stable for discharge as discussed with Dr. Richey. Pt has received appropriate discharge instructions, including medication regimen, access site management and follow up with Dr. Bhatt in 1-2 weeks.    TRIPLE THERAPY: Aspirin 81mg daily + Clopidogrel 75mg daily + Eliquis 5mg BID (as confirmed w/ IC fellow)  Statin: Atorvastatin 80mg PO daily at bedtime (increased from 40mg PO daily at bedtime)   PPI: Pantoprazole 40mg daily    *Of note, w/ renal dysfunction will DISCONTINUE Fenofibrate and INCREASE Statin as above.    To continue all medications as prescribed previously.     Cardiac Rehab (Post PCI): Education on benefits of Cardiac Rehab provided to patient: Yes, Referral and Prescription Given for Cardiac Rehab: Yes, Pt given list of locations & instructed to contact their insurance company to review list of participating providers.    Pt discharge copies detail cardiovascular history, medication testing/treatments OR has created a patient portal account and instructed to provider their records at their 1st appointment.    CVD (GLP-1 receptor agonist, SGLT2 inhibitor) meds discussed w/ patients and encouraged to discuss further with PMD or endo at next visit.

## 2025-03-20 NOTE — DISCHARGE NOTE PROVIDER - CARE PROVIDER_API CALL
Anna Bhatt  Cardiovascular Disease  [address] [place] [id number]  Phone: [phone]  Fax: [phone]  Follow Up Time: 1 week

## 2025-03-21 ENCOUNTER — TRANSCRIPTION ENCOUNTER (OUTPATIENT)
Age: 70
End: 2025-03-21

## 2025-03-21 VITALS
RESPIRATION RATE: 17 BRPM | OXYGEN SATURATION: 98 % | SYSTOLIC BLOOD PRESSURE: 107 MMHG | DIASTOLIC BLOOD PRESSURE: 52 MMHG | HEART RATE: 80 BPM

## 2025-03-21 LAB
ALBUMIN SERPL ELPH-MCNC: 3.7 G/DL — SIGNIFICANT CHANGE UP (ref 3.3–5)
ALP SERPL-CCNC: 52 U/L — SIGNIFICANT CHANGE UP (ref 40–120)
ALT FLD-CCNC: 18 U/L — SIGNIFICANT CHANGE UP (ref 10–45)
ANION GAP SERPL CALC-SCNC: 9 MMOL/L — SIGNIFICANT CHANGE UP (ref 5–17)
AST SERPL-CCNC: 23 U/L — SIGNIFICANT CHANGE UP (ref 10–40)
BASOPHILS NFR BLD AUTO: 0.8 % — SIGNIFICANT CHANGE UP (ref 0–2)
BILIRUB SERPL-MCNC: 0.3 MG/DL — SIGNIFICANT CHANGE UP (ref 0.2–1.2)
BUN SERPL-MCNC: 27 MG/DL — HIGH (ref 7–23)
CALCIUM SERPL-MCNC: 9.2 MG/DL — SIGNIFICANT CHANGE UP (ref 8.4–10.5)
CHLORIDE SERPL-SCNC: 105 MMOL/L — SIGNIFICANT CHANGE UP (ref 96–108)
CO2 SERPL-SCNC: 24 MMOL/L — SIGNIFICANT CHANGE UP (ref 22–31)
CREAT SERPL-MCNC: 1.42 MG/DL — HIGH (ref 0.5–1.3)
EGFR: 54 ML/MIN/1.73M2 — LOW
EGFR: 54 ML/MIN/1.73M2 — LOW
EOSINOPHIL # BLD AUTO: 0.22 K/UL — SIGNIFICANT CHANGE UP (ref 0–0.5)
EOSINOPHIL NFR BLD AUTO: 3.1 % — SIGNIFICANT CHANGE UP (ref 0–6)
GLUCOSE SERPL-MCNC: 99 MG/DL — SIGNIFICANT CHANGE UP (ref 70–99)
HCT VFR BLD CALC: 33.2 % — LOW (ref 39–50)
HGB BLD-MCNC: 11 G/DL — LOW (ref 13–17)
IMM GRANULOCYTES NFR BLD AUTO: 0.4 % — SIGNIFICANT CHANGE UP (ref 0–0.9)
LYMPHOCYTES # BLD AUTO: 1.39 K/UL — SIGNIFICANT CHANGE UP (ref 1–3.3)
LYMPHOCYTES # BLD AUTO: 19.7 % — SIGNIFICANT CHANGE UP (ref 13–44)
MAGNESIUM SERPL-MCNC: 1.8 MG/DL — SIGNIFICANT CHANGE UP (ref 1.6–2.6)
MCHC RBC-ENTMCNC: 29.7 PG — SIGNIFICANT CHANGE UP (ref 27–34)
MCHC RBC-ENTMCNC: 33.1 G/DL — SIGNIFICANT CHANGE UP (ref 32–36)
MCV RBC AUTO: 89.7 FL — SIGNIFICANT CHANGE UP (ref 80–100)
MONOCYTES # BLD AUTO: 0.62 K/UL — SIGNIFICANT CHANGE UP (ref 0–0.9)
MONOCYTES NFR BLD AUTO: 8.8 % — SIGNIFICANT CHANGE UP (ref 2–14)
NEUTROPHILS # BLD AUTO: 4.75 K/UL — SIGNIFICANT CHANGE UP (ref 1.8–7.4)
NEUTROPHILS NFR BLD AUTO: 67.2 % — SIGNIFICANT CHANGE UP (ref 43–77)
NRBC BLD AUTO-RTO: 0 /100 WBCS — SIGNIFICANT CHANGE UP (ref 0–0)
POTASSIUM SERPL-MCNC: 4.4 MMOL/L — SIGNIFICANT CHANGE UP (ref 3.5–5.3)
POTASSIUM SERPL-SCNC: 4.4 MMOL/L — SIGNIFICANT CHANGE UP (ref 3.5–5.3)
PROT SERPL-MCNC: 6.8 G/DL — SIGNIFICANT CHANGE UP (ref 6–8.3)
RBC # BLD: 3.7 M/UL — LOW (ref 4.2–5.8)
RBC # FLD: 14.9 % — HIGH (ref 10.3–14.5)
SODIUM SERPL-SCNC: 138 MMOL/L — SIGNIFICANT CHANGE UP (ref 135–145)
WBC # BLD: 7.07 K/UL — SIGNIFICANT CHANGE UP (ref 3.8–10.5)
WBC # FLD AUTO: 7.07 K/UL — SIGNIFICANT CHANGE UP (ref 3.8–10.5)

## 2025-03-21 PROCEDURE — 82962 GLUCOSE BLOOD TEST: CPT

## 2025-03-21 PROCEDURE — 82553 CREATINE MB FRACTION: CPT

## 2025-03-21 PROCEDURE — C1725: CPT

## 2025-03-21 PROCEDURE — 85730 THROMBOPLASTIN TIME PARTIAL: CPT

## 2025-03-21 PROCEDURE — C1769: CPT

## 2025-03-21 PROCEDURE — 85347 COAGULATION TIME ACTIVATED: CPT

## 2025-03-21 PROCEDURE — 99239 HOSP IP/OBS DSCHRG MGMT >30: CPT

## 2025-03-21 PROCEDURE — 93005 ELECTROCARDIOGRAM TRACING: CPT

## 2025-03-21 PROCEDURE — C1874: CPT

## 2025-03-21 PROCEDURE — C1894: CPT

## 2025-03-21 PROCEDURE — 83036 HEMOGLOBIN GLYCOSYLATED A1C: CPT

## 2025-03-21 PROCEDURE — C1753: CPT

## 2025-03-21 PROCEDURE — 85610 PROTHROMBIN TIME: CPT

## 2025-03-21 PROCEDURE — 80061 LIPID PANEL: CPT

## 2025-03-21 PROCEDURE — 80053 COMPREHEN METABOLIC PANEL: CPT

## 2025-03-21 PROCEDURE — 36415 COLL VENOUS BLD VENIPUNCTURE: CPT

## 2025-03-21 PROCEDURE — C1887: CPT

## 2025-03-21 PROCEDURE — 83735 ASSAY OF MAGNESIUM: CPT

## 2025-03-21 PROCEDURE — 82550 ASSAY OF CK (CPK): CPT

## 2025-03-21 PROCEDURE — 85025 COMPLETE CBC W/AUTO DIFF WBC: CPT

## 2025-03-21 RX ORDER — ATORVASTATIN CALCIUM 80 MG/1
1 TABLET, FILM COATED ORAL
Qty: 7 | Refills: 0
Start: 2025-03-21 | End: 2025-03-27

## 2025-03-21 RX ORDER — APIXABAN 2.5 MG/1
1 TABLET, FILM COATED ORAL
Qty: 60 | Refills: 11
Start: 2025-03-21 | End: 2026-03-15

## 2025-03-21 RX ORDER — ATORVASTATIN CALCIUM 80 MG/1
1 TABLET, FILM COATED ORAL
Qty: 30 | Refills: 11
Start: 2025-03-21 | End: 2026-03-15

## 2025-03-21 RX ORDER — CLOPIDOGREL BISULFATE 75 MG/1
1 TABLET, FILM COATED ORAL
Qty: 30 | Refills: 11
Start: 2025-03-21 | End: 2026-03-15

## 2025-03-21 RX ORDER — MAGNESIUM OXIDE 400 MG
800 TABLET ORAL ONCE
Refills: 0 | Status: COMPLETED | OUTPATIENT
Start: 2025-03-21 | End: 2025-03-21

## 2025-03-21 RX ORDER — ASPIRIN 325 MG
1 TABLET ORAL
Qty: 7 | Refills: 0
Start: 2025-03-21 | End: 2025-03-27

## 2025-03-21 RX ORDER — ATORVASTATIN CALCIUM 80 MG/1
80 TABLET, FILM COATED ORAL AT BEDTIME
Refills: 0 | Status: DISCONTINUED | OUTPATIENT
Start: 2025-03-21 | End: 2025-03-21

## 2025-03-21 RX ADMIN — Medication 800 MILLIGRAM(S): at 11:47

## 2025-03-21 RX ADMIN — Medication 81 MILLIGRAM(S): at 11:47

## 2025-03-21 RX ADMIN — Medication 40 MILLIGRAM(S): at 06:29

## 2025-03-21 RX ADMIN — RANOLAZINE 500 MILLIGRAM(S): 1000 TABLET, FILM COATED, EXTENDED RELEASE ORAL at 06:29

## 2025-03-21 RX ADMIN — APIXABAN 5 MILLIGRAM(S): 2.5 TABLET, FILM COATED ORAL at 06:29

## 2025-03-21 RX ADMIN — CLOPIDOGREL BISULFATE 75 MILLIGRAM(S): 75 TABLET, FILM COATED ORAL at 11:47

## 2025-03-21 RX ADMIN — MONTELUKAST SODIUM 10 MILLIGRAM(S): 10 TABLET ORAL at 11:47

## 2025-03-21 NOTE — DISCHARGE NOTE NURSING/CASE MANAGEMENT/SOCIAL WORK - PATIENT PORTAL LINK FT
You can access the FollowMyHealth Patient Portal offered by [facility] by registering at the following website: [email or web address]. By joining [employer]’s FollowMyHealth portal, you will also be able to view your health information using other applications (apps) compatible with our system.

## 2025-03-21 NOTE — DISCHARGE NOTE NURSING/CASE MANAGEMENT/SOCIAL WORK - NSDCPEELIQUISCOMP_GEN_ALL_CORE
Apixaban/Eliquis is used to treat and prevent blood clots. If you are not able to swallow the tablets whole, they may be crushed and mixed in water, apple juice, or applesauce and promptly taken within four hours. Never skip a dose of Apixaban/Eliquis. If you forget to take your Apixaban/Eliquis, take a dose as soon as you remember. If it is almost time for your next Apixaban/Eliquis dose, wait until then and take a regular dose. DO NOT take an extra pill to ‘catch up’.  NEVER TAKE A DOUBLE DOSE. Notify your doctor that you missed a dose. Take Apixaban/Eliquis at the same time each morning and evening. Apixaban/Eliquis may be taken with other medication or food. ---

## 2025-03-21 NOTE — DISCHARGE NOTE NURSING/CASE MANAGEMENT/SOCIAL WORK - FINANCIAL ASSISTANCE
[facility] provides services at a reduced cost to those who are determined to be eligible through [facility]’s financial assistance program. Information regarding [facility]’s financial assistance program can be found by going to [email or web address] or by calling [phone].

## 2025-03-31 DIAGNOSIS — I25.2 OLD MYOCARDIAL INFARCTION: ICD-10-CM

## 2025-03-31 DIAGNOSIS — I49.3 VENTRICULAR PREMATURE DEPOLARIZATION: ICD-10-CM

## 2025-03-31 DIAGNOSIS — Z87.891 PERSONAL HISTORY OF NICOTINE DEPENDENCE: ICD-10-CM

## 2025-03-31 DIAGNOSIS — I50.9 HEART FAILURE, UNSPECIFIED: ICD-10-CM

## 2025-03-31 DIAGNOSIS — G47.33 OBSTRUCTIVE SLEEP APNEA (ADULT) (PEDIATRIC): ICD-10-CM

## 2025-03-31 DIAGNOSIS — I48.91 UNSPECIFIED ATRIAL FIBRILLATION: ICD-10-CM

## 2025-03-31 DIAGNOSIS — Z79.1 LONG TERM (CURRENT) USE OF NON-STEROIDAL ANTI-INFLAMMATORIES (NSAID): ICD-10-CM

## 2025-03-31 DIAGNOSIS — I44.7 LEFT BUNDLE-BRANCH BLOCK, UNSPECIFIED: ICD-10-CM

## 2025-03-31 DIAGNOSIS — Z82.49 FAMILY HISTORY OF ISCHEMIC HEART DISEASE AND OTHER DISEASES OF THE CIRCULATORY SYSTEM: ICD-10-CM

## 2025-03-31 DIAGNOSIS — E11.22 TYPE 2 DIABETES MELLITUS WITH DIABETIC CHRONIC KIDNEY DISEASE: ICD-10-CM

## 2025-03-31 DIAGNOSIS — Z95.2 PRESENCE OF PROSTHETIC HEART VALVE: ICD-10-CM

## 2025-03-31 DIAGNOSIS — N40.0 BENIGN PROSTATIC HYPERPLASIA WITHOUT LOWER URINARY TRACT SYMPTOMS: ICD-10-CM

## 2025-03-31 DIAGNOSIS — M10.9 GOUT, UNSPECIFIED: ICD-10-CM

## 2025-03-31 DIAGNOSIS — E78.5 HYPERLIPIDEMIA, UNSPECIFIED: ICD-10-CM

## 2025-03-31 DIAGNOSIS — I25.110 ATHEROSCLEROTIC HEART DISEASE OF NATIVE CORONARY ARTERY WITH UNSTABLE ANGINA PECTORIS: ICD-10-CM

## 2025-03-31 DIAGNOSIS — Z86.718 PERSONAL HISTORY OF OTHER VENOUS THROMBOSIS AND EMBOLISM: ICD-10-CM

## 2025-03-31 DIAGNOSIS — Z79.02 LONG TERM (CURRENT) USE OF ANTITHROMBOTICS/ANTIPLATELETS: ICD-10-CM

## 2025-03-31 DIAGNOSIS — I25.10 ATHEROSCLEROTIC HEART DISEASE OF NATIVE CORONARY ARTERY WITHOUT ANGINA PECTORIS: ICD-10-CM

## 2025-03-31 DIAGNOSIS — M19.90 UNSPECIFIED OSTEOARTHRITIS, UNSPECIFIED SITE: ICD-10-CM

## 2025-03-31 DIAGNOSIS — I13.0 HYPERTENSIVE HEART AND CHRONIC KIDNEY DISEASE WITH HEART FAILURE AND STAGE 1 THROUGH STAGE 4 CHRONIC KIDNEY DISEASE, OR UNSPECIFIED CHRONIC KIDNEY DISEASE: ICD-10-CM

## 2025-03-31 DIAGNOSIS — Z79.85 LONG-TERM (CURRENT) USE OF INJECTABLE NON-INSULIN ANTIDIABETIC DRUGS: ICD-10-CM

## 2025-03-31 DIAGNOSIS — Z86.711 PERSONAL HISTORY OF PULMONARY EMBOLISM: ICD-10-CM

## 2025-03-31 DIAGNOSIS — Z79.01 LONG TERM (CURRENT) USE OF ANTICOAGULANTS: ICD-10-CM

## 2025-03-31 DIAGNOSIS — K76.0 FATTY (CHANGE OF) LIVER, NOT ELSEWHERE CLASSIFIED: ICD-10-CM

## 2025-03-31 DIAGNOSIS — I27.20 PULMONARY HYPERTENSION, UNSPECIFIED: ICD-10-CM

## 2025-05-23 NOTE — DIETITIAN INITIAL EVALUATION ADULT - CALCULATED FROM (G/KG)
Bill For Surgical Tray: no
Expected Date Of Service: 05/23/2025
Billing Type: Third-Party Bill
Performing Laboratory: [phone]
[phone]

## 2025-06-09 ENCOUNTER — APPOINTMENT (OUTPATIENT)
Dept: HEART AND VASCULAR | Facility: CLINIC | Age: 70
End: 2025-06-09
Payer: MEDICARE

## 2025-06-09 VITALS — DIASTOLIC BLOOD PRESSURE: 60 MMHG | SYSTOLIC BLOOD PRESSURE: 113 MMHG | HEART RATE: 81 BPM

## 2025-06-09 VITALS — HEIGHT: 70 IN | BODY MASS INDEX: 40.37 KG/M2 | TEMPERATURE: 97.2 F | WEIGHT: 282 LBS

## 2025-06-09 PROCEDURE — G2211 COMPLEX E/M VISIT ADD ON: CPT

## 2025-06-09 PROCEDURE — 93000 ELECTROCARDIOGRAM COMPLETE: CPT

## 2025-06-09 PROCEDURE — 99213 OFFICE O/P EST LOW 20 MIN: CPT

## 2025-07-17 ENCOUNTER — APPOINTMENT (OUTPATIENT)
Dept: CT IMAGING | Facility: HOSPITAL | Age: 70
End: 2025-07-17

## 2025-07-17 ENCOUNTER — RESULT REVIEW (OUTPATIENT)
Age: 70
End: 2025-07-17

## 2025-07-17 ENCOUNTER — OUTPATIENT (OUTPATIENT)
Dept: OUTPATIENT SERVICES | Facility: HOSPITAL | Age: 70
LOS: 1 days | End: 2025-07-17
Payer: MEDICARE

## 2025-07-17 DIAGNOSIS — Z98.890 OTHER SPECIFIED POSTPROCEDURAL STATES: Chronic | ICD-10-CM

## 2025-07-17 DIAGNOSIS — Z98.61 CORONARY ANGIOPLASTY STATUS: Chronic | ICD-10-CM

## 2025-07-17 DIAGNOSIS — I24.0 ACUTE CORONARY THROMBOSIS NOT RESULTING IN MYOCARDIAL INFARCTION: Chronic | ICD-10-CM

## 2025-07-17 PROCEDURE — 75574 CT ANGIO HRT W/3D IMAGE: CPT

## 2025-07-17 PROCEDURE — 75574 CT ANGIO HRT W/3D IMAGE: CPT | Mod: 26

## 2025-07-17 PROCEDURE — 74174 CTA ABD&PLVS W/CONTRAST: CPT | Mod: 26

## 2025-07-17 PROCEDURE — 71275 CT ANGIOGRAPHY CHEST: CPT | Mod: 26

## 2025-07-17 PROCEDURE — 71275 CT ANGIOGRAPHY CHEST: CPT

## 2025-07-17 PROCEDURE — 74174 CTA ABD&PLVS W/CONTRAST: CPT

## 2025-07-17 PROCEDURE — 82565 ASSAY OF CREATININE: CPT

## 2025-07-21 ENCOUNTER — APPOINTMENT (OUTPATIENT)
Dept: MRI IMAGING | Facility: HOSPITAL | Age: 70
End: 2025-07-21

## 2025-07-21 ENCOUNTER — OUTPATIENT (OUTPATIENT)
Dept: OUTPATIENT SERVICES | Facility: HOSPITAL | Age: 70
LOS: 1 days | End: 2025-07-21
Payer: MEDICARE

## 2025-07-21 DIAGNOSIS — Z98.61 CORONARY ANGIOPLASTY STATUS: Chronic | ICD-10-CM

## 2025-07-21 DIAGNOSIS — I24.0 ACUTE CORONARY THROMBOSIS NOT RESULTING IN MYOCARDIAL INFARCTION: Chronic | ICD-10-CM

## 2025-07-21 DIAGNOSIS — Z98.890 OTHER SPECIFIED POSTPROCEDURAL STATES: Chronic | ICD-10-CM

## 2025-07-21 PROCEDURE — 75561 CARDIAC MRI FOR MORPH W/DYE: CPT | Mod: 26

## 2025-09-15 ENCOUNTER — APPOINTMENT (OUTPATIENT)
Dept: HEART AND VASCULAR | Facility: CLINIC | Age: 70
End: 2025-09-15
Payer: MEDICARE

## 2025-09-15 VITALS — HEIGHT: 70 IN | TEMPERATURE: 97.5 F | BODY MASS INDEX: 38.65 KG/M2 | WEIGHT: 270 LBS

## 2025-09-15 VITALS — DIASTOLIC BLOOD PRESSURE: 59 MMHG | SYSTOLIC BLOOD PRESSURE: 119 MMHG | HEART RATE: 78 BPM

## 2025-09-15 PROCEDURE — 99213 OFFICE O/P EST LOW 20 MIN: CPT | Mod: 25

## 2025-09-15 PROCEDURE — 93285 PRGRMG DEV EVAL SCRMS IP: CPT

## 2025-09-16 RX ORDER — METOPROLOL SUCCINATE 25 MG/1
25 TABLET, EXTENDED RELEASE ORAL
Qty: 90 | Refills: 1 | Status: ACTIVE | COMMUNITY
Start: 2025-09-16 | End: 1900-01-01

## 2025-09-24 PROBLEM — R06.83 SNORING: Status: ACTIVE | Noted: 2025-09-24

## (undated) DEVICE — URETERAL CATH RED RUBBER 10FR (BLACK)

## (undated) DEVICE — TUBING IV EXTENSION 30"

## (undated) DEVICE — SUT NUROLON 1 18" OS-8 (POP-OFF)

## (undated) DEVICE — ULTRASOUND COVER PROBE 14 X 122CM

## (undated) DEVICE — SUT TICRON 2-0 36" CV-316 DA

## (undated) DEVICE — SUT SILK 5-0 60" TIES

## (undated) DEVICE — DRAPE SLUSH / WARMER 44 X 66"

## (undated) DEVICE — HEMOSTASIS VALVE PHD SM BORE W/ SPRING METAL INSERTION TOOL AND TORQUE DEVICE

## (undated) DEVICE — SUT PROLENE 3-0 36" SH-1

## (undated) DEVICE — SUT VICRYL 2-0 27" CT-1

## (undated) DEVICE — DRSG MEPILEX 10 X 25CM (4 X 10") AG

## (undated) DEVICE — Device

## (undated) DEVICE — STRYKER COLORADO N-SERIES 3CM STRAIGHT

## (undated) DEVICE — FOLEY TRAY 16FR 5CC LF LUBRISIL ADVANCE TEMP CLOSED

## (undated) DEVICE — PACK PROC CV DRAPE

## (undated) DEVICE — DRAPE TOWEL BLUE 17" X 24"

## (undated) DEVICE — VENODYNE/SCD SLEEVE CALF MEDIUM

## (undated) DEVICE — BIPOLAR FORCEP KIRWAN JEWELERS STR 4" X 0.4MM W 12FT CORD (GREEN)

## (undated) DEVICE — TUBING EXTENSION HI PRESSURE FLEX 48"

## (undated) DEVICE — DRAPE MAGNETIC INSTRUMENT MEDIUM

## (undated) DEVICE — DRAPE SURGICAL #1010

## (undated) DEVICE — MANIFOLD ANGIO AUTOMD TRNDCR

## (undated) DEVICE — BAND RADIAL COMPRESSION DEVICE LONG 29CM

## (undated) DEVICE — CATH NG SALEM SUMP 16FR

## (undated) DEVICE — SUCTION YANKAUER BULBOUS TIP W VENT

## (undated) DEVICE — POSITIONER FOAM HEAD DONUT 9" (PINK)

## (undated) DEVICE — PACING CABLE (BROWN) A/V TEMP SCREW DOWN 12FT

## (undated) DEVICE — SUT PLEDGET SOFT MEDIUM 1/4" X 1/8" X 1/16" X6

## (undated) DEVICE — DRAPE PROBE COVER 5" X 96"

## (undated) DEVICE — SUT VICRYL 0 27" CT

## (undated) DEVICE — CATH CV TRAY INSR ST UNIV

## (undated) DEVICE — SUT PLEDGET 9MM X 4MM X 1.5MM

## (undated) DEVICE — DRAPE OR CAMERA COVER

## (undated) DEVICE — GLV 6 PROTEXIS (WHITE)

## (undated) DEVICE — WARMING BLANKET LOWER ADULT

## (undated) DEVICE — SUT STAINLESS STEEL 7 4-18" CCS

## (undated) DEVICE — CHEST DRAIN PLEUR-EVAC DRY/WET ADULT-PEDS SINGLE (QUICK)

## (undated) DEVICE — SUT VICRYL 1 36" CTX UNDYED

## (undated) DEVICE — SUT CHROMIC 3-0 27" SH

## (undated) DEVICE — WARMING BLANKET FULL UNDERBODY

## (undated) DEVICE — TOURNIQUET SET 12FR (1 RED, 1 BLUE, 3 CLEAR, 1 SNARE) 7"

## (undated) DEVICE — SUT VICRYL 4-0 18" PS-2 UNDYED

## (undated) DEVICE — SUT CHROMIC 4-0 27" SH

## (undated) DEVICE — SUT ETHIBOND 3-0 36" RB-1

## (undated) DEVICE — TUBING SUCTION NONCONDUCTIVE 6MM X 12FT

## (undated) DEVICE — SUT HOLDER INSERT FOR OCTOBASE STERNAL RETRACTOR

## (undated) DEVICE — SUT STAINLESS STEEL 6 4-18" CCS

## (undated) DEVICE — SUT SILK 2-0 18" SH (POP-OFF)

## (undated) DEVICE — PREP SCRUB BRUSH W CHG 4%

## (undated) DEVICE — PACK HYBRID LHH

## (undated) DEVICE — POSITIONER FOAM EGG CRATE ULNAR 2PCS (PINK)

## (undated) DEVICE — CATH CARDIAC RT CUSET 601201319

## (undated) DEVICE — DRSG TRACH DRAINAGE 4X4

## (undated) DEVICE — SYR MED A2000 SYRINGE KIT ACIST REUS

## (undated) DEVICE — DRSG BIOPATCH DISK W CHG 1" W 4.0MM HOLE

## (undated) DEVICE — SUT VICRYL 3-0 18" SH (POP-OFF)

## (undated) DEVICE — NDL PERCU ECHOTIP 21G X 4CM

## (undated) DEVICE — DRAPE IOBAN 33" X 23"

## (undated) DEVICE — ELCTR ZOLL DEFIBRILLATOR PAD NO REPLACEMENT

## (undated) DEVICE — CATH ANGIO 14G X 3.25"

## (undated) DEVICE — SUT PROLENE 4-0 36" RB-1

## (undated) DEVICE — SUT PROLENE 6-0 30" RB-2

## (undated) DEVICE — SUT DOUBLE 6 WIRE STERNAL

## (undated) DEVICE — PACK OPEN HEART LNX

## (undated) DEVICE — RETRACTOR  TRAXI PANNICULUS DISP

## (undated) DEVICE — SYS DEL CONTROLLER ANGIOTOUCH

## (undated) DEVICE — PACK UPPER BODY

## (undated) DEVICE — MARKING PEN W RULER

## (undated) DEVICE — GLV 6.5 PROTEXIS (WHITE)

## (undated) DEVICE — RIGID ADULT SUCKER

## (undated) DEVICE — SUMP INTRACARDIAC/PERICARDIAL 20FR 1/4" ADULT